# Patient Record
Sex: FEMALE | Race: WHITE | Employment: OTHER | URBAN - METROPOLITAN AREA
[De-identification: names, ages, dates, MRNs, and addresses within clinical notes are randomized per-mention and may not be internally consistent; named-entity substitution may affect disease eponyms.]

---

## 2017-02-16 ENCOUNTER — OFFICE VISIT (OUTPATIENT)
Dept: PRIMARY CARE CLINIC | Age: 77
End: 2017-02-16

## 2017-02-16 VITALS
BODY MASS INDEX: 23.41 KG/M2 | DIASTOLIC BLOOD PRESSURE: 80 MMHG | HEART RATE: 79 BPM | TEMPERATURE: 97.9 F | SYSTOLIC BLOOD PRESSURE: 130 MMHG | RESPIRATION RATE: 14 BRPM | OXYGEN SATURATION: 94 % | WEIGHT: 124 LBS | HEIGHT: 61 IN

## 2017-02-16 DIAGNOSIS — J43.1 PANLOBULAR EMPHYSEMA (HCC): ICD-10-CM

## 2017-02-16 DIAGNOSIS — Z12.31 SCREENING MAMMOGRAM, ENCOUNTER FOR: ICD-10-CM

## 2017-02-16 DIAGNOSIS — J44.9 OBSTRUCTIVE CHRONIC BRONCHITIS WITHOUT EXACERBATION (HCC): ICD-10-CM

## 2017-02-16 DIAGNOSIS — I10 HTN (HYPERTENSION), BENIGN: Primary | ICD-10-CM

## 2017-02-16 DIAGNOSIS — E78.2 MIXED HYPERLIPIDEMIA: ICD-10-CM

## 2017-02-16 PROCEDURE — 1090F PRES/ABSN URINE INCON ASSESS: CPT | Performed by: FAMILY MEDICINE

## 2017-02-16 PROCEDURE — G8399 PT W/DXA RESULTS DOCUMENT: HCPCS | Performed by: FAMILY MEDICINE

## 2017-02-16 PROCEDURE — G8427 DOCREV CUR MEDS BY ELIG CLIN: HCPCS | Performed by: FAMILY MEDICINE

## 2017-02-16 PROCEDURE — 99213 OFFICE O/P EST LOW 20 MIN: CPT | Performed by: FAMILY MEDICINE

## 2017-02-16 PROCEDURE — G8420 CALC BMI NORM PARAMETERS: HCPCS | Performed by: FAMILY MEDICINE

## 2017-02-16 PROCEDURE — 1123F ACP DISCUSS/DSCN MKR DOCD: CPT | Performed by: FAMILY MEDICINE

## 2017-02-16 PROCEDURE — 3023F SPIROM DOC REV: CPT | Performed by: FAMILY MEDICINE

## 2017-02-16 PROCEDURE — G8484 FLU IMMUNIZE NO ADMIN: HCPCS | Performed by: FAMILY MEDICINE

## 2017-02-16 PROCEDURE — 3017F COLORECTAL CA SCREEN DOC REV: CPT | Performed by: FAMILY MEDICINE

## 2017-02-16 PROCEDURE — 4004F PT TOBACCO SCREEN RCVD TLK: CPT | Performed by: FAMILY MEDICINE

## 2017-02-16 PROCEDURE — 4040F PNEUMOC VAC/ADMIN/RCVD: CPT | Performed by: FAMILY MEDICINE

## 2017-02-16 PROCEDURE — G8926 SPIRO NO PERF OR DOC: HCPCS | Performed by: FAMILY MEDICINE

## 2017-02-16 PROCEDURE — 3014F SCREEN MAMMO DOC REV: CPT | Performed by: FAMILY MEDICINE

## 2017-02-16 RX ORDER — AMLODIPINE BESYLATE 5 MG/1
TABLET ORAL
Qty: 90 TABLET | Refills: 1 | Status: SHIPPED | OUTPATIENT
Start: 2017-02-16 | End: 2017-09-25 | Stop reason: SDUPTHER

## 2017-02-16 ASSESSMENT — ENCOUNTER SYMPTOMS
SHORTNESS OF BREATH: 0
DIARRHEA: 0
COUGH: 0
VOMITING: 0
CONSTIPATION: 0
NAUSEA: 0
EYES NEGATIVE: 1
GASTROINTESTINAL NEGATIVE: 1
RESPIRATORY NEGATIVE: 1
PHOTOPHOBIA: 0
ABDOMINAL PAIN: 0
EYE ITCHING: 0
EYE REDNESS: 0
WHEEZING: 0
BACK PAIN: 0

## 2017-02-16 ASSESSMENT — PATIENT HEALTH QUESTIONNAIRE - PHQ9
SUM OF ALL RESPONSES TO PHQ QUESTIONS 1-9: 0
2. FEELING DOWN, DEPRESSED OR HOPELESS: 0
1. LITTLE INTEREST OR PLEASURE IN DOING THINGS: 0
SUM OF ALL RESPONSES TO PHQ9 QUESTIONS 1 & 2: 0

## 2017-03-02 ENCOUNTER — HOSPITAL ENCOUNTER (OUTPATIENT)
Dept: WOMENS IMAGING | Age: 77
Discharge: HOME | End: 2017-03-02
Payer: MEDICARE

## 2017-03-02 DIAGNOSIS — Z12.31 SCREENING MAMMOGRAM, ENCOUNTER FOR: ICD-10-CM

## 2017-03-02 PROCEDURE — G0202 SCR MAMMO BI INCL CAD: HCPCS

## 2017-09-25 ENCOUNTER — OFFICE VISIT (OUTPATIENT)
Dept: PRIMARY CARE CLINIC | Age: 77
End: 2017-09-25

## 2017-09-25 VITALS
OXYGEN SATURATION: 97 % | DIASTOLIC BLOOD PRESSURE: 84 MMHG | HEIGHT: 61 IN | WEIGHT: 128 LBS | SYSTOLIC BLOOD PRESSURE: 150 MMHG | TEMPERATURE: 97 F | HEART RATE: 76 BPM | BODY MASS INDEX: 24.17 KG/M2 | RESPIRATION RATE: 14 BRPM

## 2017-09-25 DIAGNOSIS — E78.2 MIXED HYPERLIPIDEMIA: ICD-10-CM

## 2017-09-25 DIAGNOSIS — J43.2 CENTRILOBULAR EMPHYSEMA (HCC): ICD-10-CM

## 2017-09-25 DIAGNOSIS — I10 HTN (HYPERTENSION), BENIGN: Primary | ICD-10-CM

## 2017-09-25 DIAGNOSIS — J30.2 SEASONAL ALLERGIC RHINITIS, UNSPECIFIED ALLERGIC RHINITIS TRIGGER: ICD-10-CM

## 2017-09-25 DIAGNOSIS — Z00.00 PREVENTATIVE HEALTH CARE: ICD-10-CM

## 2017-09-25 LAB
ALBUMIN SERPL-MCNC: 4.1 G/DL (ref 3.9–4.9)
ALP BLD-CCNC: 106 U/L (ref 40–130)
ALT SERPL-CCNC: 11 U/L (ref 0–33)
ANION GAP SERPL CALCULATED.3IONS-SCNC: 17 MEQ/L (ref 7–13)
AST SERPL-CCNC: 17 U/L (ref 0–35)
BILIRUB SERPL-MCNC: 0.4 MG/DL (ref 0–1.2)
BUN BLDV-MCNC: 12 MG/DL (ref 8–23)
CALCIUM SERPL-MCNC: 9.2 MG/DL (ref 8.6–10.2)
CHLORIDE BLD-SCNC: 100 MEQ/L (ref 98–107)
CHOLESTEROL, TOTAL: 210 MG/DL (ref 0–199)
CO2: 26 MEQ/L (ref 22–29)
CREAT SERPL-MCNC: 0.53 MG/DL (ref 0.5–0.9)
GFR AFRICAN AMERICAN: >60
GFR NON-AFRICAN AMERICAN: >60
GLOBULIN: 3.1 G/DL (ref 2.3–3.5)
GLUCOSE BLD-MCNC: 87 MG/DL (ref 74–109)
HDLC SERPL-MCNC: 73 MG/DL (ref 40–59)
LDL CHOLESTEROL CALCULATED: 106 MG/DL (ref 0–129)
POTASSIUM SERPL-SCNC: 4.4 MEQ/L (ref 3.5–5.1)
SODIUM BLD-SCNC: 143 MEQ/L (ref 132–144)
TOTAL PROTEIN: 7.2 G/DL (ref 6.4–8.1)
TRIGL SERPL-MCNC: 155 MG/DL (ref 0–200)

## 2017-09-25 PROCEDURE — G8420 CALC BMI NORM PARAMETERS: HCPCS | Performed by: FAMILY MEDICINE

## 2017-09-25 PROCEDURE — 4040F PNEUMOC VAC/ADMIN/RCVD: CPT | Performed by: FAMILY MEDICINE

## 2017-09-25 PROCEDURE — 3023F SPIROM DOC REV: CPT | Performed by: FAMILY MEDICINE

## 2017-09-25 PROCEDURE — 1090F PRES/ABSN URINE INCON ASSESS: CPT | Performed by: FAMILY MEDICINE

## 2017-09-25 PROCEDURE — 1123F ACP DISCUSS/DSCN MKR DOCD: CPT | Performed by: FAMILY MEDICINE

## 2017-09-25 PROCEDURE — G8399 PT W/DXA RESULTS DOCUMENT: HCPCS | Performed by: FAMILY MEDICINE

## 2017-09-25 PROCEDURE — 3017F COLORECTAL CA SCREEN DOC REV: CPT | Performed by: FAMILY MEDICINE

## 2017-09-25 PROCEDURE — 99214 OFFICE O/P EST MOD 30 MIN: CPT | Performed by: FAMILY MEDICINE

## 2017-09-25 PROCEDURE — G8926 SPIRO NO PERF OR DOC: HCPCS | Performed by: FAMILY MEDICINE

## 2017-09-25 PROCEDURE — 4004F PT TOBACCO SCREEN RCVD TLK: CPT | Performed by: FAMILY MEDICINE

## 2017-09-25 PROCEDURE — G8427 DOCREV CUR MEDS BY ELIG CLIN: HCPCS | Performed by: FAMILY MEDICINE

## 2017-09-25 RX ORDER — FEXOFENADINE HCL 180 MG/1
180 TABLET ORAL DAILY
Qty: 30 TABLET | Refills: 1 | Status: SHIPPED | OUTPATIENT
Start: 2017-09-25 | End: 2019-09-09

## 2017-09-25 RX ORDER — AMLODIPINE BESYLATE 5 MG/1
TABLET ORAL
Qty: 90 TABLET | Refills: 1 | Status: SHIPPED | OUTPATIENT
Start: 2017-09-25 | End: 2018-02-12 | Stop reason: SDUPTHER

## 2017-09-25 ASSESSMENT — ENCOUNTER SYMPTOMS
ABDOMINAL PAIN: 0
ORTHOPNEA: 0
CONSTIPATION: 0
PHOTOPHOBIA: 0
SWOLLEN GLANDS: 0
EYE ITCHING: 0
EYE REDNESS: 0
SHORTNESS OF BREATH: 0
SINUS PRESSURE: 1
DIARRHEA: 0
BLURRED VISION: 0
SORE THROAT: 0
HOARSE VOICE: 0
WHEEZING: 0
COUGH: 0
EYES NEGATIVE: 1
RESPIRATORY NEGATIVE: 1
BACK PAIN: 0
GASTROINTESTINAL NEGATIVE: 1

## 2018-02-12 ENCOUNTER — OFFICE VISIT (OUTPATIENT)
Dept: PRIMARY CARE CLINIC | Age: 78
End: 2018-02-12
Payer: MEDICARE

## 2018-02-12 VITALS
HEIGHT: 61 IN | RESPIRATION RATE: 18 BRPM | DIASTOLIC BLOOD PRESSURE: 82 MMHG | SYSTOLIC BLOOD PRESSURE: 130 MMHG | WEIGHT: 130 LBS | OXYGEN SATURATION: 95 % | BODY MASS INDEX: 24.55 KG/M2 | TEMPERATURE: 97.9 F | HEART RATE: 70 BPM

## 2018-02-12 DIAGNOSIS — E78.2 MIXED HYPERLIPIDEMIA: ICD-10-CM

## 2018-02-12 DIAGNOSIS — J44.9 OBSTRUCTIVE CHRONIC BRONCHITIS WITHOUT EXACERBATION (HCC): ICD-10-CM

## 2018-02-12 DIAGNOSIS — J43.1 PANLOBULAR EMPHYSEMA (HCC): ICD-10-CM

## 2018-02-12 DIAGNOSIS — Z00.00 PREVENTATIVE HEALTH CARE: ICD-10-CM

## 2018-02-12 DIAGNOSIS — I10 HTN (HYPERTENSION), BENIGN: Primary | ICD-10-CM

## 2018-02-12 PROCEDURE — 1090F PRES/ABSN URINE INCON ASSESS: CPT | Performed by: FAMILY MEDICINE

## 2018-02-12 PROCEDURE — 3023F SPIROM DOC REV: CPT | Performed by: FAMILY MEDICINE

## 2018-02-12 PROCEDURE — 99214 OFFICE O/P EST MOD 30 MIN: CPT | Performed by: FAMILY MEDICINE

## 2018-02-12 PROCEDURE — 4004F PT TOBACCO SCREEN RCVD TLK: CPT | Performed by: FAMILY MEDICINE

## 2018-02-12 PROCEDURE — 1123F ACP DISCUSS/DSCN MKR DOCD: CPT | Performed by: FAMILY MEDICINE

## 2018-02-12 PROCEDURE — G8484 FLU IMMUNIZE NO ADMIN: HCPCS | Performed by: FAMILY MEDICINE

## 2018-02-12 PROCEDURE — G8420 CALC BMI NORM PARAMETERS: HCPCS | Performed by: FAMILY MEDICINE

## 2018-02-12 PROCEDURE — G8427 DOCREV CUR MEDS BY ELIG CLIN: HCPCS | Performed by: FAMILY MEDICINE

## 2018-02-12 PROCEDURE — G8926 SPIRO NO PERF OR DOC: HCPCS | Performed by: FAMILY MEDICINE

## 2018-02-12 PROCEDURE — 3017F COLORECTAL CA SCREEN DOC REV: CPT | Performed by: FAMILY MEDICINE

## 2018-02-12 PROCEDURE — G8399 PT W/DXA RESULTS DOCUMENT: HCPCS | Performed by: FAMILY MEDICINE

## 2018-02-12 PROCEDURE — 4040F PNEUMOC VAC/ADMIN/RCVD: CPT | Performed by: FAMILY MEDICINE

## 2018-02-12 RX ORDER — AMLODIPINE BESYLATE 5 MG/1
TABLET ORAL
Qty: 90 TABLET | Refills: 3 | Status: SHIPPED | OUTPATIENT
Start: 2018-02-12 | End: 2018-11-07 | Stop reason: SDUPTHER

## 2018-02-12 ASSESSMENT — ENCOUNTER SYMPTOMS
GASTROINTESTINAL NEGATIVE: 1
BACK PAIN: 0
CONSTIPATION: 0
DIARRHEA: 0
ABDOMINAL PAIN: 0
PHOTOPHOBIA: 0
ORTHOPNEA: 0
EYES NEGATIVE: 1
WHEEZING: 0
SHORTNESS OF BREATH: 0
BLURRED VISION: 0
EYE REDNESS: 0
RESPIRATORY NEGATIVE: 1
EYE ITCHING: 0

## 2018-10-25 ENCOUNTER — OFFICE VISIT (OUTPATIENT)
Dept: PRIMARY CARE CLINIC | Age: 78
End: 2018-10-25
Payer: MEDICARE

## 2018-10-25 VITALS
RESPIRATION RATE: 16 BRPM | DIASTOLIC BLOOD PRESSURE: 80 MMHG | WEIGHT: 134 LBS | SYSTOLIC BLOOD PRESSURE: 124 MMHG | HEART RATE: 69 BPM | BODY MASS INDEX: 25.3 KG/M2 | HEIGHT: 61 IN | TEMPERATURE: 97.9 F | OXYGEN SATURATION: 95 %

## 2018-10-25 DIAGNOSIS — I10 HTN (HYPERTENSION), BENIGN: Primary | ICD-10-CM

## 2018-10-25 DIAGNOSIS — E55.9 VITAMIN D DEFICIENCY: ICD-10-CM

## 2018-10-25 DIAGNOSIS — E78.2 MIXED HYPERLIPIDEMIA: ICD-10-CM

## 2018-10-25 DIAGNOSIS — J43.1 PANLOBULAR EMPHYSEMA (HCC): ICD-10-CM

## 2018-10-25 DIAGNOSIS — Z00.00 PREVENTATIVE HEALTH CARE: ICD-10-CM

## 2018-10-25 DIAGNOSIS — H54.7 VISION IMPAIRMENT: ICD-10-CM

## 2018-10-25 LAB
ALBUMIN SERPL-MCNC: 4.1 G/DL (ref 3.9–4.9)
ALP BLD-CCNC: 87 U/L (ref 40–130)
ALT SERPL-CCNC: 7 U/L (ref 0–33)
ANION GAP SERPL CALCULATED.3IONS-SCNC: 14 MEQ/L (ref 7–13)
AST SERPL-CCNC: 15 U/L (ref 0–35)
BILIRUB SERPL-MCNC: 0.3 MG/DL (ref 0–1.2)
BUN BLDV-MCNC: 11 MG/DL (ref 8–23)
CALCIUM SERPL-MCNC: 9.5 MG/DL (ref 8.6–10.2)
CHLORIDE BLD-SCNC: 102 MEQ/L (ref 98–107)
CHOLESTEROL, TOTAL: 203 MG/DL (ref 0–199)
CO2: 27 MEQ/L (ref 22–29)
CONTROL: NORMAL
CREAT SERPL-MCNC: 0.53 MG/DL (ref 0.5–0.9)
GFR AFRICAN AMERICAN: >60
GFR NON-AFRICAN AMERICAN: >60
GLOBULIN: 3.3 G/DL (ref 2.3–3.5)
GLUCOSE BLD-MCNC: 75 MG/DL (ref 74–109)
HDLC SERPL-MCNC: 65 MG/DL (ref 40–59)
HEMOCCULT STL QL: NORMAL
LDL CHOLESTEROL CALCULATED: 108 MG/DL (ref 0–129)
POTASSIUM SERPL-SCNC: 4.4 MEQ/L (ref 3.5–5.1)
SODIUM BLD-SCNC: 143 MEQ/L (ref 132–144)
TOTAL PROTEIN: 7.4 G/DL (ref 6.4–8.1)
TRIGL SERPL-MCNC: 150 MG/DL (ref 0–200)
VITAMIN D 25-HYDROXY: 41.4 NG/ML (ref 30–100)

## 2018-10-25 PROCEDURE — 1123F ACP DISCUSS/DSCN MKR DOCD: CPT | Performed by: FAMILY MEDICINE

## 2018-10-25 PROCEDURE — 1090F PRES/ABSN URINE INCON ASSESS: CPT | Performed by: FAMILY MEDICINE

## 2018-10-25 PROCEDURE — 4040F PNEUMOC VAC/ADMIN/RCVD: CPT | Performed by: FAMILY MEDICINE

## 2018-10-25 PROCEDURE — 1036F TOBACCO NON-USER: CPT | Performed by: FAMILY MEDICINE

## 2018-10-25 PROCEDURE — G8484 FLU IMMUNIZE NO ADMIN: HCPCS | Performed by: FAMILY MEDICINE

## 2018-10-25 PROCEDURE — G8427 DOCREV CUR MEDS BY ELIG CLIN: HCPCS | Performed by: FAMILY MEDICINE

## 2018-10-25 PROCEDURE — 3023F SPIROM DOC REV: CPT | Performed by: FAMILY MEDICINE

## 2018-10-25 PROCEDURE — 99214 OFFICE O/P EST MOD 30 MIN: CPT | Performed by: FAMILY MEDICINE

## 2018-10-25 PROCEDURE — G8399 PT W/DXA RESULTS DOCUMENT: HCPCS | Performed by: FAMILY MEDICINE

## 2018-10-25 PROCEDURE — 82274 ASSAY TEST FOR BLOOD FECAL: CPT | Performed by: FAMILY MEDICINE

## 2018-10-25 PROCEDURE — G8419 CALC BMI OUT NRM PARAM NOF/U: HCPCS | Performed by: FAMILY MEDICINE

## 2018-10-25 PROCEDURE — 1101F PT FALLS ASSESS-DOCD LE1/YR: CPT | Performed by: FAMILY MEDICINE

## 2018-10-25 PROCEDURE — G8926 SPIRO NO PERF OR DOC: HCPCS | Performed by: FAMILY MEDICINE

## 2018-10-25 RX ORDER — UBIDECARENONE 100 MG
100 CAPSULE ORAL 2 TIMES DAILY
COMMUNITY

## 2018-10-25 ASSESSMENT — ENCOUNTER SYMPTOMS
CONSTIPATION: 0
DIARRHEA: 0
BACK PAIN: 0
ABDOMINAL PAIN: 0
RESPIRATORY NEGATIVE: 1
EYE REDNESS: 0
WHEEZING: 0
GASTROINTESTINAL NEGATIVE: 1
EYE ITCHING: 0
PHOTOPHOBIA: 0
BLURRED VISION: 0
ORTHOPNEA: 0
SHORTNESS OF BREATH: 0

## 2018-10-25 ASSESSMENT — PATIENT HEALTH QUESTIONNAIRE - PHQ9
SUM OF ALL RESPONSES TO PHQ9 QUESTIONS 1 & 2: 0
SUM OF ALL RESPONSES TO PHQ QUESTIONS 1-9: 0
SUM OF ALL RESPONSES TO PHQ QUESTIONS 1-9: 0
2. FEELING DOWN, DEPRESSED OR HOPELESS: 0
1. LITTLE INTEREST OR PLEASURE IN DOING THINGS: 0

## 2018-10-25 NOTE — PROGRESS NOTES
PER PROBLEM SHEET     Family History   Problem Relation Age of Onset    Diabetes Other         GRANDMOTHER     Social History     Social History    Marital status:      Spouse name: N/A    Number of children: N/A    Years of education: N/A     Occupational History    Not on file. Social History Main Topics    Smoking status: Former Smoker     Packs/day: 0.25     Years: 25.00     Types: Cigarettes     Quit date: 8/25/2018    Smokeless tobacco: Never Used    Alcohol use No    Drug use: No    Sexual activity: Not on file     Other Topics Concern    Not on file     Social History Narrative    No narrative on file     Allergies:  Patient has no allergy information on record. Review of Systems   Constitutional: Negative. Negative for activity change, appetite change, fatigue and malaise/fatigue. HENT: Negative. Eyes: Positive for visual disturbance. Negative for blurred vision, photophobia, redness and itching. Respiratory: Negative. Negative for shortness of breath and wheezing. Cardiovascular: Negative. Negative for chest pain, palpitations, orthopnea and PND. Gastrointestinal: Negative. Negative for abdominal pain, constipation and diarrhea. Genitourinary: Negative. Negative for hematuria, pelvic pain and urgency. Musculoskeletal: Negative. Negative for back pain and neck pain. Skin: Negative. Neurological: Negative. Negative for headaches. Psychiatric/Behavioral: Negative. Negative for agitation and behavioral problems. The patient is not nervous/anxious. Objective:   /80 (Site: Right Upper Arm, Position: Sitting, Cuff Size: Medium Adult)   Pulse 69   Temp 97.9 °F (36.6 °C) (Oral)   Resp 16   Ht 5' 1\" (1.549 m)   Wt 134 lb (60.8 kg)   LMP  (LMP Unknown)   SpO2 95%   BMI 25.32 kg/m²     Physical Exam   Constitutional: She is oriented to person, place, and time. She appears well-developed and well-nourished.    HENT:   Head: Normocephalic

## 2018-11-07 ENCOUNTER — OFFICE VISIT (OUTPATIENT)
Dept: PRIMARY CARE CLINIC | Age: 78
End: 2018-11-07
Payer: MEDICARE

## 2018-11-07 VITALS
BODY MASS INDEX: 25.3 KG/M2 | WEIGHT: 134 LBS | TEMPERATURE: 98 F | RESPIRATION RATE: 16 BRPM | OXYGEN SATURATION: 94 % | HEIGHT: 61 IN | HEART RATE: 64 BPM | DIASTOLIC BLOOD PRESSURE: 84 MMHG | SYSTOLIC BLOOD PRESSURE: 124 MMHG

## 2018-11-07 DIAGNOSIS — J43.1 PANLOBULAR EMPHYSEMA (HCC): ICD-10-CM

## 2018-11-07 DIAGNOSIS — Z23 NEEDS FLU SHOT: ICD-10-CM

## 2018-11-07 DIAGNOSIS — I10 HTN (HYPERTENSION), BENIGN: Primary | ICD-10-CM

## 2018-11-07 DIAGNOSIS — E78.2 MIXED HYPERLIPIDEMIA: ICD-10-CM

## 2018-11-07 PROCEDURE — G0008 ADMIN INFLUENZA VIRUS VAC: HCPCS | Performed by: FAMILY MEDICINE

## 2018-11-07 PROCEDURE — 4040F PNEUMOC VAC/ADMIN/RCVD: CPT | Performed by: FAMILY MEDICINE

## 2018-11-07 PROCEDURE — 3023F SPIROM DOC REV: CPT | Performed by: FAMILY MEDICINE

## 2018-11-07 PROCEDURE — G8482 FLU IMMUNIZE ORDER/ADMIN: HCPCS | Performed by: FAMILY MEDICINE

## 2018-11-07 PROCEDURE — 1090F PRES/ABSN URINE INCON ASSESS: CPT | Performed by: FAMILY MEDICINE

## 2018-11-07 PROCEDURE — 90662 IIV NO PRSV INCREASED AG IM: CPT | Performed by: FAMILY MEDICINE

## 2018-11-07 PROCEDURE — G8427 DOCREV CUR MEDS BY ELIG CLIN: HCPCS | Performed by: FAMILY MEDICINE

## 2018-11-07 PROCEDURE — G8399 PT W/DXA RESULTS DOCUMENT: HCPCS | Performed by: FAMILY MEDICINE

## 2018-11-07 PROCEDURE — 1101F PT FALLS ASSESS-DOCD LE1/YR: CPT | Performed by: FAMILY MEDICINE

## 2018-11-07 PROCEDURE — 1036F TOBACCO NON-USER: CPT | Performed by: FAMILY MEDICINE

## 2018-11-07 PROCEDURE — 99213 OFFICE O/P EST LOW 20 MIN: CPT | Performed by: FAMILY MEDICINE

## 2018-11-07 PROCEDURE — G8926 SPIRO NO PERF OR DOC: HCPCS | Performed by: FAMILY MEDICINE

## 2018-11-07 PROCEDURE — 1123F ACP DISCUSS/DSCN MKR DOCD: CPT | Performed by: FAMILY MEDICINE

## 2018-11-07 PROCEDURE — G8419 CALC BMI OUT NRM PARAM NOF/U: HCPCS | Performed by: FAMILY MEDICINE

## 2018-11-07 RX ORDER — AMLODIPINE BESYLATE 5 MG/1
TABLET ORAL
Qty: 90 TABLET | Refills: 1 | Status: SHIPPED | OUTPATIENT
Start: 2018-11-07 | End: 2019-05-06 | Stop reason: SDUPTHER

## 2018-11-07 ASSESSMENT — ENCOUNTER SYMPTOMS
WHEEZING: 0
PHOTOPHOBIA: 0
GASTROINTESTINAL NEGATIVE: 1
ABDOMINAL PAIN: 0
EYE ITCHING: 0
SHORTNESS OF BREATH: 0
DIARRHEA: 0
EYE REDNESS: 0
BACK PAIN: 0
CONSTIPATION: 0
RESPIRATORY NEGATIVE: 1
EYES NEGATIVE: 1

## 2018-12-02 ENCOUNTER — CARE COORDINATION (OUTPATIENT)
Dept: CARE COORDINATION | Age: 78
End: 2018-12-02

## 2018-12-04 ENCOUNTER — CARE COORDINATION (OUTPATIENT)
Dept: CARE COORDINATION | Age: 78
End: 2018-12-04

## 2018-12-12 ENCOUNTER — CARE COORDINATION (OUTPATIENT)
Dept: CARE COORDINATION | Age: 78
End: 2018-12-12

## 2019-02-15 ENCOUNTER — TELEPHONE (OUTPATIENT)
Dept: FAMILY MEDICINE CLINIC | Age: 79
End: 2019-02-15

## 2019-05-06 ENCOUNTER — OFFICE VISIT (OUTPATIENT)
Dept: FAMILY MEDICINE CLINIC | Age: 79
End: 2019-05-06
Payer: MEDICARE

## 2019-05-06 VITALS
HEIGHT: 61 IN | RESPIRATION RATE: 16 BRPM | OXYGEN SATURATION: 97 % | HEART RATE: 76 BPM | SYSTOLIC BLOOD PRESSURE: 144 MMHG | BODY MASS INDEX: 26.06 KG/M2 | TEMPERATURE: 98.1 F | DIASTOLIC BLOOD PRESSURE: 82 MMHG | WEIGHT: 138 LBS

## 2019-05-06 DIAGNOSIS — Z23 NEED FOR SHINGLES VACCINE: ICD-10-CM

## 2019-05-06 DIAGNOSIS — Z87.891 PERSONAL HISTORY OF NICOTINE DEPENDENCE: ICD-10-CM

## 2019-05-06 DIAGNOSIS — M85.89 OSTEOPENIA OF MULTIPLE SITES: Chronic | ICD-10-CM

## 2019-05-06 DIAGNOSIS — Z12.2 ENCOUNTER FOR SCREENING FOR LUNG CANCER: ICD-10-CM

## 2019-05-06 DIAGNOSIS — J41.1 MUCOPURULENT CHRONIC BRONCHITIS (HCC): ICD-10-CM

## 2019-05-06 DIAGNOSIS — H35.351 CYSTOID MACULAR DEGENERATION OF RIGHT EYE: Chronic | ICD-10-CM

## 2019-05-06 DIAGNOSIS — Z12.39 SCREENING FOR BREAST CANCER: ICD-10-CM

## 2019-05-06 DIAGNOSIS — F17.200 TOBACCO USE DISORDER: Chronic | ICD-10-CM

## 2019-05-06 DIAGNOSIS — I10 HTN (HYPERTENSION), BENIGN: ICD-10-CM

## 2019-05-06 DIAGNOSIS — I10 HTN (HYPERTENSION), BENIGN: Primary | ICD-10-CM

## 2019-05-06 LAB
CREATININE URINE: 50.7 MG/DL
MICROALBUMIN UR-MCNC: <1.2 MG/DL
MICROALBUMIN/CREAT UR-RTO: NORMAL MG/G (ref 0–30)
TSH SERPL DL<=0.05 MIU/L-ACNC: 1.76 UIU/ML (ref 0.44–3.86)

## 2019-05-06 PROCEDURE — 4040F PNEUMOC VAC/ADMIN/RCVD: CPT | Performed by: FAMILY MEDICINE

## 2019-05-06 PROCEDURE — G8427 DOCREV CUR MEDS BY ELIG CLIN: HCPCS | Performed by: FAMILY MEDICINE

## 2019-05-06 PROCEDURE — G8510 SCR DEP NEG, NO PLAN REQD: HCPCS | Performed by: FAMILY MEDICINE

## 2019-05-06 PROCEDURE — 1090F PRES/ABSN URINE INCON ASSESS: CPT | Performed by: FAMILY MEDICINE

## 2019-05-06 PROCEDURE — 1123F ACP DISCUSS/DSCN MKR DOCD: CPT | Performed by: FAMILY MEDICINE

## 2019-05-06 PROCEDURE — G8926 SPIRO NO PERF OR DOC: HCPCS | Performed by: FAMILY MEDICINE

## 2019-05-06 PROCEDURE — 1036F TOBACCO NON-USER: CPT | Performed by: FAMILY MEDICINE

## 2019-05-06 PROCEDURE — G8399 PT W/DXA RESULTS DOCUMENT: HCPCS | Performed by: FAMILY MEDICINE

## 2019-05-06 PROCEDURE — 3023F SPIROM DOC REV: CPT | Performed by: FAMILY MEDICINE

## 2019-05-06 PROCEDURE — 99214 OFFICE O/P EST MOD 30 MIN: CPT | Performed by: FAMILY MEDICINE

## 2019-05-06 PROCEDURE — 93000 ELECTROCARDIOGRAM COMPLETE: CPT | Performed by: FAMILY MEDICINE

## 2019-05-06 PROCEDURE — G8419 CALC BMI OUT NRM PARAM NOF/U: HCPCS | Performed by: FAMILY MEDICINE

## 2019-05-06 RX ORDER — AMLODIPINE BESYLATE 5 MG/1
TABLET ORAL
Qty: 90 TABLET | Refills: 1 | Status: SHIPPED | OUTPATIENT
Start: 2019-05-06 | End: 2020-02-05 | Stop reason: SDUPTHER

## 2019-05-06 RX ORDER — ANTIOX #8/OM3/DHA/EPA/LUT/ZEAX 250-2.5 MG
1 CAPSULE ORAL DAILY
Qty: 90 CAPSULE | Refills: 3 | Status: ON HOLD | OUTPATIENT
Start: 2019-05-06 | End: 2021-01-01

## 2019-05-06 ASSESSMENT — PATIENT HEALTH QUESTIONNAIRE - PHQ9
2. FEELING DOWN, DEPRESSED OR HOPELESS: 0
1. LITTLE INTEREST OR PLEASURE IN DOING THINGS: 0
SUM OF ALL RESPONSES TO PHQ9 QUESTIONS 1 & 2: 0
SUM OF ALL RESPONSES TO PHQ QUESTIONS 1-9: 0
SUM OF ALL RESPONSES TO PHQ QUESTIONS 1-9: 0

## 2019-05-06 NOTE — PROGRESS NOTES
Subjective  Jaky Burns, 70 y.o. female presents today with:  Chief Complaint   Patient presents with   [address]     Former Dr John Coyle pt.  Health Maintenance     discuss shingles.  Hypertension     denies chest pain and headache. HPI    This is a new patient to me. I have reviewed the past medical and surgical history, social history and family history provided. I have reviewed  medication, previous testing and working diagnoses. I have reviewed the allergies and health maintenance information and correlated it into my decision making for this patient for care, diagnostics, consultations and treatment for today's visit. Patient is here for f/u HTN. Is compliant with meds and has no side effects from them. Avoids added salt. Tries to eat healthy. Exercises occasionally. Has no chest pain, shortness of breath,  or edema. Rare brief palpitations without associated symptoms for several years. Tobacco use disorder - quit tobacco use 2 months ago; has been told she has COPD. No morning cough. No wheezing. Occasional shortness of breath. Worked in yard without [employer] yesterday. On no meds. No other questions and or concerns for today's visit      Review of Systems No fevers, chills, sweats. No unintended weight loss. No abdominal pain, nausea, vomiting, diarrhea, constipation, bloody stools, black tarry stools. No rashes. No swollen glands.         Past Medical History:   Diagnosis Date    COPD (chronic obstructive pulmonary disease) ([address]     Hypertension     Osteoarthritis      Past Surgical History:   Procedure Laterality Date    EYE SURGERY  2010    PER PROBLEM SHEET    HYSTERECTOMY  1975    PER PROBLEM SHEET    LITHOTRIPSY  2004    PER PROBLEM SHEET    VEIN SURGERY  1988    PER PROBLEM SHEET     Social History     Socioeconomic History    Marital status:      Spouse name: Not on file    Number of children: Not on file    Years of education: Not on file    Highest education level: Not on file   Occupational History    Not on file   Social Needs    Financial resource strain: Not on file    Food insecurity:     Worry: Not on file     Inability: Not on file    Transportation needs:     Medical: Not on file     Non-medical: Not on file   Tobacco Use    Smoking status: Former Smoker     Packs/day: 0.25     Years: 25.00     Pack years: 6.25     Types: Cigarettes     Last attempt to quit: 2018     Years since quittin.6    Smokeless tobacco: Never Used   Substance and Sexual Activity    Alcohol use: No    Drug use: No    Sexual activity: Not on file   Lifestyle    Physical activity:     Days per week: Not on file     Minutes per session: Not on file    Stress: Not on file   Relationships    Social connections:     Talks on phone: Not on file     Gets together: Not on file     Attends Denominational service: Not on file     Active member of club or organization: Not on file     Attends meetings of clubs or organizations: Not on file     Relationship status: Not on file    Intimate partner violence:     Fear of current or ex partner: Not on file     Emotionally abused: Not on file     Physically abused: Not on file     Forced sexual activity: Not on file   Other Topics Concern    Not on file   Social History Narrative    Not on file     Family History   Problem Relation Age of Onset    Diabetes Other         GRANDMOTHER     No Known Allergies  Current Outpatient Medications   Medication Sig Dispense Refill    amLODIPine (NORVASC) 5 MG tablet TAKE 1 TABLET BY MOUTH DAILY.  90 tablet 1    zoster recombinant adjuvanted vaccine (SHINGRIX) 50 MCG/0.5ML SUSR injection Inject 0.5 mLs into the muscle once for 1 dose 0.5 mL 0    Multiple Vitamins-Minerals (PRESERVISION AREDS 2) CAPS Take 1 capsule by mouth daily 90 capsule 3    coenzyme Q10 100 MG CAPS capsule Take 100 mg by mouth daily      fexofenadine (ALLEGRA ALLERGY) 180 MG tablet Take 1 tablet by mouth daily 30 tablet 1    Ascorbic Acid (VITAMIN C) 500 MG tablet Take 500 mg by mouth daily.  vitamin D (CHOLECALCIFEROL) 1000 UNIT TABS tablet Take 1,000 Int'l Units by mouth daily.  OMEGA-3 KRILL  MG CAPS Take 1 tablet by mouth daily. No current facility-administered medications for this visit. PMH, Surgical Hx, Family Hx, and Social Hxreviewed and updated. Health Maintenance reviewed. Objective    Vitals:    05/06/19 1115 05/06/19 1137   BP: (!) 150/90 (!) 144/82   Pulse: 76    Resp: 16    Temp: 98.1 °F (36.7 °C)    SpO2: 97%    Weight: 138 lb (62.6 kg)    Height: 5' 1\" (1.549 m)         Physical Exam   Constitutional: She is oriented to person, place, and time. She appears well-developed and well-nourished. HENT:   Head: Normocephalic and atraumatic. Eyes: Conjunctivae are normal. No scleral icterus. Neck: Neck supple. Carotid bruit is not present. No thyromegaly present. Cardiovascular: Normal rate, regular rhythm, S1 normal, S2 normal, normal heart sounds and intact distal pulses. Pulmonary/Chest: Effort normal and breath sounds normal. She has no wheezes. She has no rales. Abdominal: Soft. Bowel sounds are normal. She exhibits no distension and no mass. There is no tenderness. Musculoskeletal: She exhibits no edema (BLE). Lymphadenopathy:     She has no cervical adenopathy. Neurological: She is alert and oriented to person, place, and time. Skin: Skin is warm and dry. Psychiatric: She has a normal mood and affect.        EKG - sinus bradycardia at 59 BPM, poor R-wave progression, no acute changes  No results found for: LABA1C  Lab Results   Component Value Date    CREATININE 0.53 10/25/2018     Lab Results   Component Value Date    ALT 7 10/25/2018    AST 15 10/25/2018     Lab Results   Component Value Date    CHOL 203 (H) 10/25/2018    TRIG 150 10/25/2018    HDL 65 (H) 10/25/2018    LDLCALC 108 10/25/2018        Assessment & Plan   Visit Diagnoses and coordination of care. I have reviewed the patient's medical history in detail and updated the computerized patient record. More than 50% of the appointment was spent in face-to-face counseling, education and care coordination. Orders Placed This Encounter   Procedures    LIZETH DIGITAL SCREEN W OR WO CAD BILATERAL     Standing Status:   Future     Standing Expiration Date:   7/6/2020    DEXA BONE DENSITY AXIAL SKELETON     Standing Status:   Future     Standing Expiration Date:   5/6/2020    CT LUNG SCREENING (ANNUAL)     Standing Status:   Future     Standing Expiration Date:   5/6/2020     Order Specific Question:   Is there documentation of shared decision making? Answer:   Yes     Order Specific Question:   Does the patient show any signs or symptoms of lung cancer? Answer:   No     Order Specific Question:   Is this the first (baseline) CT or an annual exam?     Answer: Annual [2]     Order Specific Question:   Is this a low dose CT or a routine CT? Answer:   Low Dose CT [1]     Order Specific Question:   Smoking Status? Answer: Former Smoker [4]     Order Specific Question:   Date quit smoking? (must be within 15 years)     Answer:   2/1/2019     Order Specific Question:   Smoking packs per day? Answer:   0.5     Order Specific Question:   Years smoking? Answer:   36    TSH without Reflex     Standing Status:   Future     Standing Expiration Date:   5/6/2020    Microalbumin / Creatinine Urine Ratio     Standing Status:   Future     Standing Expiration Date:   7/6/2019    EKG 12 Lead     Order Specific Question:   Reason for Exam?     Answer:   Hypertension     Orders Placed This Encounter   Medications    amLODIPine (NORVASC) 5 MG tablet     Sig: TAKE 1 TABLET BY MOUTH DAILY.      Dispense:  90 tablet     Refill:  1    zoster recombinant adjuvanted vaccine (SHINGRIX) 50 MCG/0.5ML SUSR injection     Sig: Inject 0.5 mLs into the muscle once for 1 dose     Dispense:  0.5 mL     Refill:  0    Multiple Vitamins-Minerals (PRESERVISION AREDS 2) CAPS     Sig: Take 1 capsule by mouth daily     Dispense:  90 capsule     Refill:  3     Medications Discontinued During This Encounter   Medication Reason    amLODIPine (NORVASC) 5 MG tablet REORDER     Return in about 1 month (around 6/3/2019) for HTN. Quality & Risk Score Accuracy    Last edited 05/06/19 12:15 EDT by Emy Torres MD           Controlled Substances Monitoring:     No flowsheet data found.     Emy Torres MD

## 2019-06-10 ENCOUNTER — OFFICE VISIT (OUTPATIENT)
Dept: FAMILY MEDICINE CLINIC | Age: 79
End: 2019-06-10
Payer: MEDICARE

## 2019-06-10 VITALS
HEART RATE: 72 BPM | TEMPERATURE: 97.7 F | SYSTOLIC BLOOD PRESSURE: 130 MMHG | DIASTOLIC BLOOD PRESSURE: 82 MMHG | WEIGHT: 145.2 LBS | OXYGEN SATURATION: 97 % | BODY MASS INDEX: 27.41 KG/M2 | RESPIRATION RATE: 14 BRPM | HEIGHT: 61 IN

## 2019-06-10 DIAGNOSIS — I10 HTN (HYPERTENSION), BENIGN: Primary | ICD-10-CM

## 2019-06-10 DIAGNOSIS — F17.200 TOBACCO USE DISORDER: Chronic | ICD-10-CM

## 2019-06-10 PROCEDURE — 1036F TOBACCO NON-USER: CPT | Performed by: FAMILY MEDICINE

## 2019-06-10 PROCEDURE — G8399 PT W/DXA RESULTS DOCUMENT: HCPCS | Performed by: FAMILY MEDICINE

## 2019-06-10 PROCEDURE — G8427 DOCREV CUR MEDS BY ELIG CLIN: HCPCS | Performed by: FAMILY MEDICINE

## 2019-06-10 PROCEDURE — 1123F ACP DISCUSS/DSCN MKR DOCD: CPT | Performed by: FAMILY MEDICINE

## 2019-06-10 PROCEDURE — 1090F PRES/ABSN URINE INCON ASSESS: CPT | Performed by: FAMILY MEDICINE

## 2019-06-10 PROCEDURE — 4040F PNEUMOC VAC/ADMIN/RCVD: CPT | Performed by: FAMILY MEDICINE

## 2019-06-10 PROCEDURE — 99214 OFFICE O/P EST MOD 30 MIN: CPT | Performed by: FAMILY MEDICINE

## 2019-06-10 PROCEDURE — G8419 CALC BMI OUT NRM PARAM NOF/U: HCPCS | Performed by: FAMILY MEDICINE

## 2019-06-10 NOTE — PATIENT INSTRUCTIONS
[employer]: WellFed, WellFed 2, [employer]    Get a new BP cuff. Check BP a couple of times a week. If blood pressure in higher than 135/85, then call and I will prescribe low dose of lisinopril. Then, we will recheck one month after any changes in meds if they are made.

## 2019-06-10 NOTE — PROGRESS NOTES
Subjective  [place], 70 y.o. female presents today with:  Chief Complaint   Patient presents with    Hypertension     follow up, she says it is still running high           HPI    Patient is here for f/u HTN. Is compliant with meds and has no side effects from them. Avoids added salt. Tries to eat healthy. Exercises occasionally. Has no chest pain, shortness of breath, palpitations or edema. No other questions and or concerns for today's visit      Review of Systems No fevers, chills, sweats. No unintended weight loss. No abdominal pain, nausea, vomiting, diarrhea, constipation, bloody stools, black tarry stools. No rashes. No swollen glands.         Past Medical History:   Diagnosis Date    COPD (chronic obstructive pulmonary disease) ([facility] [address]     Hypertension     Osteoarthritis      Past Surgical History:   Procedure Laterality Date    EYE SURGERY      PER PROBLEM SHEET    HYSTERECTOMY      PER PROBLEM SHEET    LITHOTRIPSY      PER PROBLEM SHEET    VEIN SURGERY  1988    PER PROBLEM SHEET     Social History     Socioeconomic History    Marital status:      Spouse name: Not on file    Number of children: Not on file    Years of education: Not on file    Highest education level: Not on file   Occupational History    Not on file   Social Needs    Financial resource strain: Not on file    Food insecurity:     Worry: Not on file     Inability: Not on file    Transportation needs:     Medical: Not on file     Non-medical: Not on file   Tobacco Use    Smoking status: Former Smoker     Packs/day: 0.25     Years: 25.00     Pack years: 6.25     Types: Cigarettes     Last attempt to quit: 2018     Years since quittin.7    Smokeless tobacco: Never Used   Substance and Sexual Activity    Alcohol use: No    Drug use: No    Sexual activity: Not on file   Lifestyle    Physical activity:     Days per week: Not on file     Minutes per session: Not on file    Stress: for HTN. Controlled Substance Monitoring:    Acute and Chronic Pain Monitoring:   No flowsheet data found.         Kelsi Alvarenga MD

## 2019-06-28 ENCOUNTER — TELEPHONE (OUTPATIENT)
Dept: CASE MANAGEMENT | Age: 79
End: 2019-06-28

## 2019-06-28 NOTE — TELEPHONE ENCOUNTER
Second voicemail left requesting return call to go over smoking history.   Medicare does not require a pre-cert so if Adrian Monroy does not call back before her scheduled appointment on Mon 7/1/19 at [address] [place] [address], we can asking her smoking history at that time prior to the exam.

## 2019-06-28 NOTE — PROGRESS NOTES
Physician documentation on smoking history and CT Lung Screening reviewed. All required documentation complete. Patient is a former smoker (quit 2/2019 - 4 months) with a 33.75 pack year history per physician documentation. I spoke to patient to review details of CT Lung Screening exam.  All questions answered at this time.

## 2019-07-01 ENCOUNTER — HOSPITAL ENCOUNTER (OUTPATIENT)
Dept: WOMENS IMAGING | Age: 79
Discharge: HOME | End: 2019-07-03
Payer: MEDICARE

## 2019-07-01 ENCOUNTER — HOSPITAL ENCOUNTER (OUTPATIENT)
Dept: CT IMAGING | Age: 79
Discharge: HOME | End: 2019-07-03
Payer: MEDICARE

## 2019-07-01 DIAGNOSIS — F17.200 TOBACCO USE DISORDER: Chronic | ICD-10-CM

## 2019-07-01 DIAGNOSIS — Z12.39 SCREENING FOR BREAST CANCER: ICD-10-CM

## 2019-07-01 DIAGNOSIS — Z87.891 PERSONAL HISTORY OF NICOTINE DEPENDENCE: ICD-10-CM

## 2019-07-01 DIAGNOSIS — Z12.2 ENCOUNTER FOR SCREENING FOR LUNG CANCER: ICD-10-CM

## 2019-07-01 DIAGNOSIS — M85.89 OSTEOPENIA OF MULTIPLE SITES: Chronic | ICD-10-CM

## 2019-07-01 PROCEDURE — 77080 DXA BONE DENSITY AXIAL: CPT

## 2019-07-01 PROCEDURE — 77063 BREAST TOMOSYNTHESIS BI: CPT

## 2019-07-01 PROCEDURE — G0297 LDCT FOR LUNG CA SCREEN: HCPCS

## 2019-07-31 ENCOUNTER — OFFICE VISIT (OUTPATIENT)
Dept: FAMILY MEDICINE CLINIC | Age: 79
End: 2019-07-31
Payer: MEDICARE

## 2019-07-31 VITALS
RESPIRATION RATE: 16 BRPM | WEIGHT: 149 LBS | HEIGHT: 61 IN | OXYGEN SATURATION: 98 % | DIASTOLIC BLOOD PRESSURE: 70 MMHG | BODY MASS INDEX: 28.13 KG/M2 | TEMPERATURE: 98.5 F | HEART RATE: 78 BPM | SYSTOLIC BLOOD PRESSURE: 130 MMHG

## 2019-07-31 DIAGNOSIS — Z23 NEED FOR SHINGLES VACCINE: ICD-10-CM

## 2019-07-31 DIAGNOSIS — R06.09 DYSPNEA ON EXERTION: Chronic | ICD-10-CM

## 2019-07-31 DIAGNOSIS — M85.89 OSTEOPENIA OF MULTIPLE SITES: Primary | Chronic | ICD-10-CM

## 2019-07-31 PROBLEM — F17.200 TOBACCO USE DISORDER: Chronic | Status: RESOLVED | Noted: 2019-05-06 | Resolved: 2019-07-31

## 2019-07-31 LAB
ANION GAP SERPL CALCULATED.3IONS-SCNC: 13 MEQ/L (ref 9–15)
BASOPHILS ABSOLUTE: 0 K/UL (ref 0–0.2)
BASOPHILS RELATIVE PERCENT: 1.2 %
BUN BLDV-MCNC: 18 MG/DL (ref 8–23)
CALCIUM SERPL-MCNC: 9.5 MG/DL (ref 8.5–9.9)
CHLORIDE BLD-SCNC: 103 MEQ/L (ref 95–107)
CO2: 27 MEQ/L (ref 20–31)
CREAT SERPL-MCNC: 0.65 MG/DL (ref 0.5–0.9)
EOSINOPHILS ABSOLUTE: 0.1 K/UL (ref 0–0.7)
EOSINOPHILS RELATIVE PERCENT: 2.6 %
GFR AFRICAN AMERICAN: >60
GFR NON-AFRICAN AMERICAN: >60
GLUCOSE BLD-MCNC: 83 MG/DL (ref 70–99)
HCT VFR BLD CALC: 42 % (ref 37–47)
HEMOGLOBIN: 14.3 G/DL (ref 12–16)
LYMPHOCYTES ABSOLUTE: 1.1 K/UL (ref 1–4.8)
LYMPHOCYTES RELATIVE PERCENT: 24.7 %
MCH RBC QN AUTO: 32.4 PG (ref 27–31.3)
MCHC RBC AUTO-ENTMCNC: 34 % (ref 33–37)
MCV RBC AUTO: 95.4 FL (ref 82–100)
MONOCYTES ABSOLUTE: 0.4 K/UL (ref 0.2–0.8)
MONOCYTES RELATIVE PERCENT: 9.5 %
NEUTROPHILS ABSOLUTE: 2.6 K/UL (ref 1.4–6.5)
NEUTROPHILS RELATIVE PERCENT: 62 %
PDW BLD-RTO: 14 % (ref 11.5–14.5)
PLATELET # BLD: 297 K/UL (ref 130–400)
POTASSIUM SERPL-SCNC: 3.9 MEQ/L (ref 3.4–4.9)
RBC # BLD: 4.4 M/UL (ref 4.2–5.4)
SODIUM BLD-SCNC: 143 MEQ/L (ref 135–144)
WBC # BLD: 4.3 K/UL (ref 4.8–10.8)

## 2019-07-31 PROCEDURE — 93000 ELECTROCARDIOGRAM COMPLETE: CPT | Performed by: FAMILY MEDICINE

## 2019-07-31 PROCEDURE — G8399 PT W/DXA RESULTS DOCUMENT: HCPCS | Performed by: FAMILY MEDICINE

## 2019-07-31 PROCEDURE — 99214 OFFICE O/P EST MOD 30 MIN: CPT | Performed by: FAMILY MEDICINE

## 2019-07-31 PROCEDURE — G8427 DOCREV CUR MEDS BY ELIG CLIN: HCPCS | Performed by: FAMILY MEDICINE

## 2019-07-31 PROCEDURE — 1036F TOBACCO NON-USER: CPT | Performed by: FAMILY MEDICINE

## 2019-07-31 PROCEDURE — 1090F PRES/ABSN URINE INCON ASSESS: CPT | Performed by: FAMILY MEDICINE

## 2019-07-31 PROCEDURE — 1123F ACP DISCUSS/DSCN MKR DOCD: CPT | Performed by: FAMILY MEDICINE

## 2019-07-31 PROCEDURE — G8419 CALC BMI OUT NRM PARAM NOF/U: HCPCS | Performed by: FAMILY MEDICINE

## 2019-07-31 PROCEDURE — 4040F PNEUMOC VAC/ADMIN/RCVD: CPT | Performed by: FAMILY MEDICINE

## 2019-07-31 RX ORDER — ALENDRONATE SODIUM 70 MG/1
70 TABLET ORAL
Qty: 12 TABLET | Refills: 3 | Status: SHIPPED | OUTPATIENT
Start: 2019-07-31 | End: 2019-09-09

## 2019-07-31 RX ORDER — ANTACID TABLETS 648 MG/1
1 TABLET, CHEWABLE ORAL 2 TIMES DAILY
Qty: 60 TABLET | Refills: 11 | Status: SHIPPED | OUTPATIENT
Start: 2019-07-31 | End: 2020-08-10

## 2019-09-09 ENCOUNTER — OFFICE VISIT (OUTPATIENT)
Dept: FAMILY MEDICINE CLINIC | Age: 79
End: 2019-09-09
Payer: MEDICARE

## 2019-09-09 VITALS
TEMPERATURE: 97.2 F | DIASTOLIC BLOOD PRESSURE: 82 MMHG | OXYGEN SATURATION: 97 % | RESPIRATION RATE: 20 BRPM | SYSTOLIC BLOOD PRESSURE: 132 MMHG | HEART RATE: 74 BPM | BODY MASS INDEX: 28.7 KG/M2 | WEIGHT: 152 LBS | HEIGHT: 61 IN

## 2019-09-09 DIAGNOSIS — J41.1 MUCOPURULENT CHRONIC BRONCHITIS (HCC): ICD-10-CM

## 2019-09-09 DIAGNOSIS — Z23 NEED FOR PROPHYLACTIC VACCINATION AND INOCULATION AGAINST VARICELLA: ICD-10-CM

## 2019-09-09 DIAGNOSIS — R09.82 POST-NASAL DRIP: Chronic | ICD-10-CM

## 2019-09-09 DIAGNOSIS — I10 HTN (HYPERTENSION), BENIGN: Primary | ICD-10-CM

## 2019-09-09 DIAGNOSIS — M85.89 OSTEOPENIA OF MULTIPLE SITES: Chronic | ICD-10-CM

## 2019-09-09 DIAGNOSIS — R06.09 DYSPNEA ON EXERTION: Chronic | ICD-10-CM

## 2019-09-09 DIAGNOSIS — Z23 NEED FOR SHINGLES VACCINE: ICD-10-CM

## 2019-09-09 PROCEDURE — 4040F PNEUMOC VAC/ADMIN/RCVD: CPT | Performed by: FAMILY MEDICINE

## 2019-09-09 PROCEDURE — G8419 CALC BMI OUT NRM PARAM NOF/U: HCPCS | Performed by: FAMILY MEDICINE

## 2019-09-09 PROCEDURE — 1090F PRES/ABSN URINE INCON ASSESS: CPT | Performed by: FAMILY MEDICINE

## 2019-09-09 PROCEDURE — 3023F SPIROM DOC REV: CPT | Performed by: FAMILY MEDICINE

## 2019-09-09 PROCEDURE — 99214 OFFICE O/P EST MOD 30 MIN: CPT | Performed by: FAMILY MEDICINE

## 2019-09-09 PROCEDURE — G8399 PT W/DXA RESULTS DOCUMENT: HCPCS | Performed by: FAMILY MEDICINE

## 2019-09-09 PROCEDURE — G8926 SPIRO NO PERF OR DOC: HCPCS | Performed by: FAMILY MEDICINE

## 2019-09-09 PROCEDURE — 1036F TOBACCO NON-USER: CPT | Performed by: FAMILY MEDICINE

## 2019-09-09 PROCEDURE — G8427 DOCREV CUR MEDS BY ELIG CLIN: HCPCS | Performed by: FAMILY MEDICINE

## 2019-09-09 PROCEDURE — 1123F ACP DISCUSS/DSCN MKR DOCD: CPT | Performed by: FAMILY MEDICINE

## 2019-09-09 RX ORDER — ANTACID TABLETS 648 MG/1
TABLET, CHEWABLE ORAL
Refills: 11 | COMMUNITY
Start: 2019-07-31 | End: 2019-09-09 | Stop reason: SDUPTHER

## 2019-09-09 RX ORDER — ALENDRONATE SODIUM 70 MG/1
70 TABLET ORAL
Qty: 12 TABLET | Refills: 3 | Status: SHIPPED | OUTPATIENT
Start: 2019-09-09 | End: 2020-01-01 | Stop reason: SDUPTHER

## 2019-09-09 RX ORDER — FLUTICASONE PROPIONATE 50 MCG
2 SPRAY, SUSPENSION (ML) NASAL DAILY
Qty: 1 BOTTLE | Refills: 5 | Status: SHIPPED | OUTPATIENT
Start: 2019-09-09 | End: 2020-01-01 | Stop reason: SDUPTHER

## 2019-09-09 NOTE — PROGRESS NOTES
Medical: Not on file     Non-medical: Not on file   Tobacco Use    Smoking status: Former Smoker     Packs/day: 0.75     Years: 45.00     Pack years: 33.75     Types: Cigarettes     Start date:      Last attempt to quit: 2019     Years since quittin.6    Smokeless tobacco: Never Used   Substance and Sexual Activity    Alcohol use: No    Drug use: No    Sexual activity: Not on file   Lifestyle    Physical activity:     Days per week: Not on file     Minutes per session: Not on file    Stress: Not on file   Relationships    Social connections:     Talks on phone: Not on file     Gets together: Not on file     Attends Jainism service: Not on file     Active member of club or organization: Not on file     Attends meetings of clubs or organizations: Not on file     Relationship status: Not on file    Intimate partner violence:     Fear of current or ex partner: Not on file     Emotionally abused: Not on file     Physically abused: Not on file     Forced sexual activity: Not on file   Other Topics Concern    Not on file   Social History Narrative    Not on file     Family History   Problem Relation Age of Onset    Diabetes Other         GRANDMOTHER     No Known Allergies  Current Outpatient Medications   Medication Sig Dispense Refill    fluticasone (FLONASE) 50 MCG/ACT nasal spray 2 sprays by Nasal route daily 1 Bottle 5    alendronate (FOSAMAX) 70 MG tablet Take 1 tablet by mouth every 7 days 12 tablet 3    zoster recombinant adjuvanted vaccine (SHINGRIX) 50 MCG/0.5ML SUSR injection Inject 0.5 mLs into the muscle See Admin Instructions 1 dose now and repeat in 2-6 months 0.5 mL 0    calcium carbonate 648 MG TABS Take 1 tablet by mouth 2 times daily 60 tablet 11    amLODIPine (NORVASC) 5 MG tablet TAKE 1 TABLET BY MOUTH DAILY. 90 tablet 1    coenzyme Q10 100 MG CAPS capsule Take 100 mg by mouth daily      Ascorbic Acid (VITAMIN C) 500 MG tablet Take 500 mg by mouth daily.         vitamin Refill:  3    zoster recombinant adjuvanted vaccine (SHINGRIX) 50 MCG/0.5ML SUSR injection     Sig: Inject 0.5 mLs into the muscle See Admin Instructions 1 dose now and repeat in 2-6 months     Dispense:  0.5 mL     Refill:  0     Medications Discontinued During This Encounter   Medication Reason    calcium carbonate 181 MG TABS DUPLICATE    fexofenadine (ALLEGRA ALLERGY) 180 MG tablet LIST CLEANUP    OMEGA-3 KRILL  MG CAPS Therapy completed    alendronate (FOSAMAX) 70 MG tablet     zoster recombinant adjuvanted vaccine [facility]) 50 MCG/0.5ML SUSR injection REORDER     Return in 6 months (on 3/9/2020) for HTN. Controlled Substance Monitoring:    Acute and Chronic Pain Monitoring:   No flowsheet data found.         Amish Pal MD

## 2019-11-08 ENCOUNTER — TELEPHONE (OUTPATIENT)
Dept: FAMILY MEDICINE CLINIC | Age: 79
End: 2019-11-08

## 2019-11-12 ENCOUNTER — APPOINTMENT (OUTPATIENT)
Dept: MRI IMAGING | Age: 79
DRG: 066 | End: 2019-11-12
Payer: MEDICARE

## 2019-11-12 ENCOUNTER — APPOINTMENT (OUTPATIENT)
Dept: CT IMAGING | Age: 79
DRG: 066 | End: 2019-11-12
Payer: MEDICARE

## 2019-11-12 ENCOUNTER — HOSPITAL ENCOUNTER (INPATIENT)
Age: 79
LOS: 1 days | Discharge: NURSING, REHAB OR LONG-TERM CARE | DRG: 066 | End: 2019-11-14
Attending: EMERGENCY MEDICINE | Admitting: INTERNAL MEDICINE
Payer: MEDICARE

## 2019-11-12 ENCOUNTER — APPOINTMENT (OUTPATIENT)
Dept: GENERAL RADIOLOGY | Age: 79
DRG: 066 | End: 2019-11-12
Payer: MEDICARE

## 2019-11-12 DIAGNOSIS — I63.9 CEREBROVASCULAR ACCIDENT (CVA), UNSPECIFIED MECHANISM (HCC): Primary | ICD-10-CM

## 2019-11-12 PROBLEM — R29.90 STROKE-LIKE EPISODE: Status: ACTIVE | Noted: 2019-11-12

## 2019-11-12 LAB
ALBUMIN SERPL-MCNC: 4 G/DL (ref 3.5–4.6)
ALP BLD-CCNC: 88 U/L (ref 40–130)
ALT SERPL-CCNC: 10 U/L (ref 0–33)
ANION GAP SERPL CALCULATED.3IONS-SCNC: 10 MEQ/L (ref 9–15)
APTT: 33.6 SEC (ref 24.4–36.8)
AST SERPL-CCNC: 16 U/L (ref 0–35)
BACTERIA: NEGATIVE /HPF
BASOPHILS ABSOLUTE: 0 K/UL (ref 0–0.2)
BASOPHILS RELATIVE PERCENT: 0.9 %
BILIRUB SERPL-MCNC: 0.3 MG/DL (ref 0.2–0.7)
BILIRUBIN URINE: NEGATIVE
BLOOD, URINE: NEGATIVE
BUN BLDV-MCNC: 19 MG/DL (ref 8–23)
CALCIUM SERPL-MCNC: 9.7 MG/DL (ref 8.5–9.9)
CHLORIDE BLD-SCNC: 106 MEQ/L (ref 95–107)
CLARITY: CLEAR
CO2: 26 MEQ/L (ref 20–31)
COLOR: YELLOW
CREAT SERPL-MCNC: 0.87 MG/DL (ref 0.5–0.9)
EKG ATRIAL RATE: 83 BPM
EKG P AXIS: 64 DEGREES
EKG P-R INTERVAL: 136 MS
EKG Q-T INTERVAL: 414 MS
EKG QRS DURATION: 70 MS
EKG QTC CALCULATION (BAZETT): 486 MS
EKG R AXIS: -62 DEGREES
EKG T AXIS: 59 DEGREES
EKG VENTRICULAR RATE: 83 BPM
EOSINOPHILS ABSOLUTE: 0.2 K/UL (ref 0–0.7)
EOSINOPHILS RELATIVE PERCENT: 3.4 %
EPITHELIAL CELLS, UA: ABNORMAL /HPF (ref 0–5)
GFR AFRICAN AMERICAN: >60
GFR NON-AFRICAN AMERICAN: >60
GLOBULIN: 3.1 G/DL (ref 2.3–3.5)
GLUCOSE BLD-MCNC: 90 MG/DL (ref 60–115)
GLUCOSE BLD-MCNC: 94 MG/DL (ref 70–99)
GLUCOSE URINE: NEGATIVE MG/DL
HCT VFR BLD CALC: 41.1 % (ref 37–47)
HEMOGLOBIN: 13.7 G/DL (ref 12–16)
HYALINE CASTS: ABNORMAL /HPF (ref 0–5)
INR BLD: 0.9
KETONES, URINE: ABNORMAL MG/DL
LEUKOCYTE ESTERASE, URINE: ABNORMAL
LYMPHOCYTES ABSOLUTE: 0.9 K/UL (ref 1–4.8)
LYMPHOCYTES RELATIVE PERCENT: 17.6 %
MAGNESIUM: 2.1 MG/DL (ref 1.7–2.4)
MCH RBC QN AUTO: 30.8 PG (ref 27–31.3)
MCHC RBC AUTO-ENTMCNC: 33.3 % (ref 33–37)
MCV RBC AUTO: 92.6 FL (ref 82–100)
MONOCYTES ABSOLUTE: 0.5 K/UL (ref 0.2–0.8)
MONOCYTES RELATIVE PERCENT: 9.8 %
NEUTROPHILS ABSOLUTE: 3.4 K/UL (ref 1.4–6.5)
NEUTROPHILS RELATIVE PERCENT: 68.3 %
NITRITE, URINE: NEGATIVE
PDW BLD-RTO: 14 % (ref 11.5–14.5)
PERFORMED ON: NORMAL
PH UA: 5 (ref 5–9)
PLATELET # BLD: 273 K/UL (ref 130–400)
POTASSIUM SERPL-SCNC: 4 MEQ/L (ref 3.4–4.9)
PROTEIN UA: NEGATIVE MG/DL
PROTHROMBIN TIME: 13 SEC (ref 12.3–14.9)
RBC # BLD: 4.44 M/UL (ref 4.2–5.4)
RBC UA: ABNORMAL /HPF (ref 0–5)
SODIUM BLD-SCNC: 142 MEQ/L (ref 135–144)
SPECIFIC GRAVITY UA: 1.05 (ref 1–1.03)
TOTAL PROTEIN: 7.1 G/DL (ref 6.3–8)
TROPONIN: <0.01 NG/ML (ref 0–0.01)
URINE REFLEX TO CULTURE: YES
UROBILINOGEN, URINE: 0.2 E.U./DL
WBC # BLD: 4.9 K/UL (ref 4.8–10.8)
WBC UA: ABNORMAL /HPF (ref 0–5)

## 2019-11-12 PROCEDURE — 70551 MRI BRAIN STEM W/O DYE: CPT

## 2019-11-12 PROCEDURE — 70450 CT HEAD/BRAIN W/O DYE: CPT

## 2019-11-12 PROCEDURE — 6370000000 HC RX 637 (ALT 250 FOR IP): Performed by: NURSE PRACTITIONER

## 2019-11-12 PROCEDURE — 6360000002 HC RX W HCPCS: Performed by: NURSE PRACTITIONER

## 2019-11-12 PROCEDURE — 96372 THER/PROPH/DIAG INJ SC/IM: CPT

## 2019-11-12 PROCEDURE — 87077 CULTURE AEROBIC IDENTIFY: CPT

## 2019-11-12 PROCEDURE — 36415 COLL VENOUS BLD VENIPUNCTURE: CPT

## 2019-11-12 PROCEDURE — 6360000004 HC RX CONTRAST MEDICATION: Performed by: EMERGENCY MEDICINE

## 2019-11-12 PROCEDURE — G0378 HOSPITAL OBSERVATION PER HR: HCPCS

## 2019-11-12 PROCEDURE — 2580000003 HC RX 258: Performed by: EMERGENCY MEDICINE

## 2019-11-12 PROCEDURE — 99285 EMERGENCY DEPT VISIT HI MDM: CPT

## 2019-11-12 PROCEDURE — 81001 URINALYSIS AUTO W/SCOPE: CPT

## 2019-11-12 PROCEDURE — 80053 COMPREHEN METABOLIC PANEL: CPT

## 2019-11-12 PROCEDURE — 85610 PROTHROMBIN TIME: CPT

## 2019-11-12 PROCEDURE — 93005 ELECTROCARDIOGRAM TRACING: CPT | Performed by: EMERGENCY MEDICINE

## 2019-11-12 PROCEDURE — 87186 SC STD MICRODIL/AGAR DIL: CPT

## 2019-11-12 PROCEDURE — 85025 COMPLETE CBC W/AUTO DIFF WBC: CPT

## 2019-11-12 PROCEDURE — 70498 CT ANGIOGRAPHY NECK: CPT

## 2019-11-12 PROCEDURE — 70496 CT ANGIOGRAPHY HEAD: CPT

## 2019-11-12 PROCEDURE — 87086 URINE CULTURE/COLONY COUNT: CPT

## 2019-11-12 PROCEDURE — 83735 ASSAY OF MAGNESIUM: CPT

## 2019-11-12 PROCEDURE — 84484 ASSAY OF TROPONIN QUANT: CPT

## 2019-11-12 PROCEDURE — 6370000000 HC RX 637 (ALT 250 FOR IP): Performed by: EMERGENCY MEDICINE

## 2019-11-12 PROCEDURE — 71045 X-RAY EXAM CHEST 1 VIEW: CPT

## 2019-11-12 PROCEDURE — 85730 THROMBOPLASTIN TIME PARTIAL: CPT

## 2019-11-12 RX ORDER — AMLODIPINE BESYLATE 5 MG/1
5 TABLET ORAL DAILY
Status: DISCONTINUED | OUTPATIENT
Start: 2019-11-13 | End: 2019-11-14 | Stop reason: HOSPADM

## 2019-11-12 RX ORDER — ASPIRIN 81 MG/1
81 TABLET ORAL DAILY
Status: DISCONTINUED | OUTPATIENT
Start: 2019-11-12 | End: 2019-11-14 | Stop reason: HOSPADM

## 2019-11-12 RX ORDER — 0.9 % SODIUM CHLORIDE 0.9 %
1000 INTRAVENOUS SOLUTION INTRAVENOUS ONCE
Status: COMPLETED | OUTPATIENT
Start: 2019-11-12 | End: 2019-11-12

## 2019-11-12 RX ORDER — ONDANSETRON 2 MG/ML
4 INJECTION INTRAMUSCULAR; INTRAVENOUS EVERY 6 HOURS PRN
Status: DISCONTINUED | OUTPATIENT
Start: 2019-11-12 | End: 2019-11-14 | Stop reason: HOSPADM

## 2019-11-12 RX ORDER — FLUTICASONE PROPIONATE 50 MCG
2 SPRAY, SUSPENSION (ML) NASAL DAILY
Status: DISCONTINUED | OUTPATIENT
Start: 2019-11-13 | End: 2019-11-14 | Stop reason: HOSPADM

## 2019-11-12 RX ORDER — ASPIRIN 81 MG/1
324 TABLET, CHEWABLE ORAL ONCE
Status: COMPLETED | OUTPATIENT
Start: 2019-11-12 | End: 2019-11-12

## 2019-11-12 RX ORDER — ASCORBIC ACID 500 MG
500 TABLET ORAL DAILY
Status: DISCONTINUED | OUTPATIENT
Start: 2019-11-13 | End: 2019-11-14 | Stop reason: HOSPADM

## 2019-11-12 RX ORDER — CALCIUM CARBONATE 200(500)MG
1 TABLET,CHEWABLE ORAL 2 TIMES DAILY
Status: DISCONTINUED | OUTPATIENT
Start: 2019-11-12 | End: 2019-11-14 | Stop reason: HOSPADM

## 2019-11-12 RX ORDER — ASPIRIN 300 MG/1
300 SUPPOSITORY RECTAL DAILY
Status: DISCONTINUED | OUTPATIENT
Start: 2019-11-12 | End: 2019-11-14 | Stop reason: HOSPADM

## 2019-11-12 RX ORDER — SODIUM CHLORIDE 0.9 % (FLUSH) 0.9 %
10 SYRINGE (ML) INJECTION EVERY 12 HOURS SCHEDULED
Status: DISCONTINUED | OUTPATIENT
Start: 2019-11-12 | End: 2019-11-14 | Stop reason: HOSPADM

## 2019-11-12 RX ORDER — UBIDECARENONE 100 MG
100 CAPSULE ORAL DAILY
Status: DISCONTINUED | OUTPATIENT
Start: 2019-11-13 | End: 2019-11-14 | Stop reason: HOSPADM

## 2019-11-12 RX ORDER — ROSUVASTATIN CALCIUM 40 MG/1
40 TABLET, COATED ORAL NIGHTLY
Status: DISCONTINUED | OUTPATIENT
Start: 2019-11-12 | End: 2019-11-13 | Stop reason: SDUPTHER

## 2019-11-12 RX ORDER — SODIUM CHLORIDE 0.9 % (FLUSH) 0.9 %
10 SYRINGE (ML) INJECTION PRN
Status: DISCONTINUED | OUTPATIENT
Start: 2019-11-12 | End: 2019-11-14 | Stop reason: HOSPADM

## 2019-11-12 RX ORDER — VITAMIN B COMPLEX
1000 TABLET ORAL DAILY
Status: DISCONTINUED | OUTPATIENT
Start: 2019-11-13 | End: 2019-11-14 | Stop reason: HOSPADM

## 2019-11-12 RX ADMIN — ASPIRIN 81 MG 324 MG: 81 TABLET ORAL at 16:22

## 2019-11-12 RX ADMIN — IOPAMIDOL 100 ML: 612 INJECTION, SOLUTION INTRAVENOUS at 16:37

## 2019-11-12 RX ADMIN — ROSUVASTATIN 40 MG: 40 TABLET, FILM COATED ORAL at 22:11

## 2019-11-12 RX ADMIN — ASPIRIN 81 MG: 81 TABLET, COATED ORAL at 22:11

## 2019-11-12 RX ADMIN — ENOXAPARIN SODIUM 30 MG: 30 INJECTION SUBCUTANEOUS at 22:11

## 2019-11-12 RX ADMIN — SODIUM CHLORIDE 1000 ML: 9 INJECTION, SOLUTION INTRAVENOUS at 16:22

## 2019-11-12 ASSESSMENT — ENCOUNTER SYMPTOMS
NAUSEA: 0
SHORTNESS OF BREATH: 0
COUGH: 0
BACK PAIN: 0
ABDOMINAL PAIN: 0
VOMITING: 0
DIARRHEA: 0
SORE THROAT: 0

## 2019-11-13 LAB
ANION GAP SERPL CALCULATED.3IONS-SCNC: 8 MEQ/L (ref 9–15)
BUN BLDV-MCNC: 21 MG/DL (ref 8–23)
CALCIUM SERPL-MCNC: 9.3 MG/DL (ref 8.5–9.9)
CHLORIDE BLD-SCNC: 107 MEQ/L (ref 95–107)
CO2: 29 MEQ/L (ref 20–31)
CREAT SERPL-MCNC: 0.66 MG/DL (ref 0.5–0.9)
FOLATE: 16.8 NG/ML (ref 7.3–26.1)
GFR AFRICAN AMERICAN: >60
GFR NON-AFRICAN AMERICAN: >60
GLUCOSE BLD-MCNC: 89 MG/DL (ref 70–99)
HCT VFR BLD CALC: 40.1 % (ref 37–47)
HEMOGLOBIN: 13 G/DL (ref 12–16)
HOMOCYSTEINE: 9.3 UMOL/L (ref 0–15)
LV EF: 60 %
LVEF MODALITY: NORMAL
MCH RBC QN AUTO: 30.3 PG (ref 27–31.3)
MCHC RBC AUTO-ENTMCNC: 32.5 % (ref 33–37)
MCV RBC AUTO: 93.4 FL (ref 82–100)
PDW BLD-RTO: 13.9 % (ref 11.5–14.5)
PLATELET # BLD: 296 K/UL (ref 130–400)
POTASSIUM REFLEX MAGNESIUM: 3.6 MEQ/L (ref 3.4–4.9)
RBC # BLD: 4.29 M/UL (ref 4.2–5.4)
SODIUM BLD-SCNC: 144 MEQ/L (ref 135–144)
VITAMIN B-12: 276 PG/ML (ref 232–1245)
WBC # BLD: 4 K/UL (ref 4.8–10.8)

## 2019-11-13 PROCEDURE — 97162 PT EVAL MOD COMPLEX 30 MIN: CPT

## 2019-11-13 PROCEDURE — 93005 ELECTROCARDIOGRAM TRACING: CPT | Performed by: NURSE PRACTITIONER

## 2019-11-13 PROCEDURE — 82746 ASSAY OF FOLIC ACID SERUM: CPT

## 2019-11-13 PROCEDURE — 6370000000 HC RX 637 (ALT 250 FOR IP): Performed by: FAMILY MEDICINE

## 2019-11-13 PROCEDURE — 99223 1ST HOSP IP/OBS HIGH 75: CPT | Performed by: PSYCHIATRY & NEUROLOGY

## 2019-11-13 PROCEDURE — 80061 LIPID PANEL: CPT

## 2019-11-13 PROCEDURE — 36415 COLL VENOUS BLD VENIPUNCTURE: CPT

## 2019-11-13 PROCEDURE — 83036 HEMOGLOBIN GLYCOSYLATED A1C: CPT

## 2019-11-13 PROCEDURE — 97166 OT EVAL MOD COMPLEX 45 MIN: CPT

## 2019-11-13 PROCEDURE — 1210000000 HC MED SURG R&B

## 2019-11-13 PROCEDURE — 80048 BASIC METABOLIC PNL TOTAL CA: CPT

## 2019-11-13 PROCEDURE — 6360000002 HC RX W HCPCS: Performed by: NURSE PRACTITIONER

## 2019-11-13 PROCEDURE — 6370000000 HC RX 637 (ALT 250 FOR IP): Performed by: NURSE PRACTITIONER

## 2019-11-13 PROCEDURE — 6370000000 HC RX 637 (ALT 250 FOR IP): Performed by: PSYCHIATRY & NEUROLOGY

## 2019-11-13 PROCEDURE — 93306 TTE W/DOPPLER COMPLETE: CPT

## 2019-11-13 PROCEDURE — 85027 COMPLETE CBC AUTOMATED: CPT

## 2019-11-13 PROCEDURE — 83090 ASSAY OF HOMOCYSTEINE: CPT

## 2019-11-13 PROCEDURE — 82607 VITAMIN B-12: CPT

## 2019-11-13 PROCEDURE — 2580000003 HC RX 258: Performed by: NURSE PRACTITIONER

## 2019-11-13 PROCEDURE — 96372 THER/PROPH/DIAG INJ SC/IM: CPT

## 2019-11-13 RX ORDER — CLONIDINE HYDROCHLORIDE 0.1 MG/1
0.1 TABLET ORAL ONCE
Status: COMPLETED | OUTPATIENT
Start: 2019-11-13 | End: 2019-11-13

## 2019-11-13 RX ORDER — ATORVASTATIN CALCIUM 20 MG/1
20 TABLET, FILM COATED ORAL NIGHTLY
Status: DISCONTINUED | OUTPATIENT
Start: 2019-11-13 | End: 2019-11-14 | Stop reason: HOSPADM

## 2019-11-13 RX ORDER — ATORVASTATIN CALCIUM 20 MG/1
20 TABLET, FILM COATED ORAL NIGHTLY
Qty: 30 TABLET | Refills: 3 | Status: SHIPPED | OUTPATIENT
Start: 2019-11-13 | End: 2019-11-25

## 2019-11-13 RX ORDER — ASPIRIN 81 MG/1
81 TABLET ORAL DAILY
Qty: 30 TABLET | Refills: 3 | Status: ON HOLD | OUTPATIENT
Start: 2019-11-14 | End: 2021-01-01 | Stop reason: HOSPADM

## 2019-11-13 RX ADMIN — Medication 100 MG: at 09:10

## 2019-11-13 RX ADMIN — OXYCODONE HYDROCHLORIDE AND ACETAMINOPHEN 500 MG: 500 TABLET ORAL at 09:10

## 2019-11-13 RX ADMIN — CLONIDINE HYDROCHLORIDE 0.1 MG: 0.1 TABLET ORAL at 20:29

## 2019-11-13 RX ADMIN — ATORVASTATIN CALCIUM 20 MG: 20 TABLET, FILM COATED ORAL at 20:29

## 2019-11-13 RX ADMIN — VITAMIN D, TAB 1000IU (100/BT) 1000 UNITS: 25 TAB at 09:10

## 2019-11-13 RX ADMIN — Medication 10 ML: at 09:11

## 2019-11-13 RX ADMIN — AMLODIPINE BESYLATE 5 MG: 5 TABLET ORAL at 09:12

## 2019-11-13 RX ADMIN — ANTACID TABLETS 500 MG: 500 TABLET, CHEWABLE ORAL at 09:10

## 2019-11-13 RX ADMIN — ASPIRIN 81 MG: 81 TABLET, COATED ORAL at 09:10

## 2019-11-13 RX ADMIN — ENOXAPARIN SODIUM 30 MG: 30 INJECTION SUBCUTANEOUS at 09:09

## 2019-11-13 ASSESSMENT — ENCOUNTER SYMPTOMS
PHOTOPHOBIA: 0
NAUSEA: 0
VOMITING: 0
SHORTNESS OF BREATH: 0
BACK PAIN: 0
TROUBLE SWALLOWING: 0
CHOKING: 0

## 2019-11-13 ASSESSMENT — PAIN SCALES - GENERAL
PAINLEVEL_OUTOF10: 0

## 2019-11-14 ENCOUNTER — HOSPITAL ENCOUNTER (INPATIENT)
Age: 79
LOS: 10 days | Discharge: HOME HEALTH | DRG: 057 | End: 2019-11-24
Attending: PHYSICAL MEDICINE & REHABILITATION | Admitting: PHYSICAL MEDICINE & REHABILITATION
Payer: MEDICARE

## 2019-11-14 VITALS
WEIGHT: 70.5 LBS | TEMPERATURE: 98.1 F | HEART RATE: 71 BPM | OXYGEN SATURATION: 93 % | DIASTOLIC BLOOD PRESSURE: 66 MMHG | SYSTOLIC BLOOD PRESSURE: 130 MMHG | HEIGHT: 66 IN | RESPIRATION RATE: 16 BRPM | BODY MASS INDEX: 11.33 KG/M2

## 2019-11-14 DIAGNOSIS — R41.89 COGNITIVE DECLINE: ICD-10-CM

## 2019-11-14 DIAGNOSIS — Z78.9 DECREASED ACTIVITIES OF DAILY LIVING (ADL): Primary | ICD-10-CM

## 2019-11-14 DIAGNOSIS — R26.0 ATAXIC GAIT: ICD-10-CM

## 2019-11-14 PROBLEM — I63.9 RIGHT-SIDED CEREBROVASCULAR ACCIDENT (CVA) (HCC): Status: ACTIVE | Noted: 2019-11-12

## 2019-11-14 PROBLEM — R26.9 ABNORMALITY OF GAIT AND MOBILITY: Status: ACTIVE | Noted: 2019-11-14

## 2019-11-14 PROBLEM — M85.89 OSTEOPENIA OF MULTIPLE SITES: Chronic | Status: RESOLVED | Noted: 2019-05-06 | Resolved: 2019-11-14

## 2019-11-14 LAB
CHOLESTEROL, TOTAL: 155 MG/DL (ref 0–199)
CHOLESTEROL, TOTAL: 185 MG/DL (ref 0–199)
HBA1C MFR BLD: 6 % (ref 4.8–5.9)
HDLC SERPL-MCNC: 50 MG/DL (ref 40–59)
HDLC SERPL-MCNC: 51 MG/DL (ref 40–59)
LDL CHOLESTEROL CALCULATED: 70 MG/DL (ref 0–129)
LDL CHOLESTEROL CALCULATED: 96 MG/DL (ref 0–129)
TRIGL SERPL-MCNC: 174 MG/DL (ref 0–150)
TRIGL SERPL-MCNC: 189 MG/DL (ref 0–150)

## 2019-11-14 PROCEDURE — 6360000002 HC RX W HCPCS: Performed by: NURSE PRACTITIONER

## 2019-11-14 PROCEDURE — 36415 COLL VENOUS BLD VENIPUNCTURE: CPT

## 2019-11-14 PROCEDURE — 6360000002 HC RX W HCPCS

## 2019-11-14 PROCEDURE — 93010 ELECTROCARDIOGRAM REPORT: CPT | Performed by: INTERNAL MEDICINE

## 2019-11-14 PROCEDURE — 97116 GAIT TRAINING THERAPY: CPT

## 2019-11-14 PROCEDURE — 1180000000 HC REHAB R&B

## 2019-11-14 PROCEDURE — 99233 SBSQ HOSP IP/OBS HIGH 50: CPT | Performed by: PSYCHIATRY & NEUROLOGY

## 2019-11-14 PROCEDURE — 6370000000 HC RX 637 (ALT 250 FOR IP): Performed by: NURSE PRACTITIONER

## 2019-11-14 PROCEDURE — 92523 SPEECH SOUND LANG COMPREHEN: CPT

## 2019-11-14 PROCEDURE — 99222 1ST HOSP IP/OBS MODERATE 55: CPT | Performed by: PHYSICAL MEDICINE & REHABILITATION

## 2019-11-14 PROCEDURE — G0008 ADMIN INFLUENZA VIRUS VAC: HCPCS

## 2019-11-14 PROCEDURE — 2580000003 HC RX 258: Performed by: NURSE PRACTITIONER

## 2019-11-14 PROCEDURE — 90686 IIV4 VACC NO PRSV 0.5 ML IM: CPT

## 2019-11-14 PROCEDURE — 6370000000 HC RX 637 (ALT 250 FOR IP): Performed by: INTERNAL MEDICINE

## 2019-11-14 PROCEDURE — 92610 EVALUATE SWALLOWING FUNCTION: CPT

## 2019-11-14 PROCEDURE — 80061 LIPID PANEL: CPT

## 2019-11-14 RX ORDER — FLUTICASONE PROPIONATE 50 MCG
2 SPRAY, SUSPENSION (ML) NASAL DAILY
Status: DISCONTINUED | OUTPATIENT
Start: 2019-11-15 | End: 2019-11-21

## 2019-11-14 RX ORDER — ASPIRIN 81 MG/1
81 TABLET ORAL DAILY
Status: CANCELLED | OUTPATIENT
Start: 2019-11-15

## 2019-11-14 RX ORDER — ATORVASTATIN CALCIUM 20 MG/1
20 TABLET, FILM COATED ORAL NIGHTLY
Status: DISCONTINUED | OUTPATIENT
Start: 2019-11-14 | End: 2019-11-24 | Stop reason: HOSPADM

## 2019-11-14 RX ORDER — UBIDECARENONE 100 MG
100 CAPSULE ORAL DAILY
Status: DISCONTINUED | OUTPATIENT
Start: 2019-11-15 | End: 2019-11-24 | Stop reason: HOSPADM

## 2019-11-14 RX ORDER — ATORVASTATIN CALCIUM 20 MG/1
20 TABLET, FILM COATED ORAL NIGHTLY
Status: CANCELLED | OUTPATIENT
Start: 2019-11-14

## 2019-11-14 RX ORDER — AMLODIPINE BESYLATE 5 MG/1
5 TABLET ORAL DAILY
Status: CANCELLED | OUTPATIENT
Start: 2019-11-15

## 2019-11-14 RX ORDER — VITAMIN B COMPLEX
1000 TABLET ORAL DAILY
Status: CANCELLED | OUTPATIENT
Start: 2019-11-15

## 2019-11-14 RX ORDER — AMLODIPINE BESYLATE 5 MG/1
5 TABLET ORAL DAILY
Status: DISCONTINUED | OUTPATIENT
Start: 2019-11-15 | End: 2019-11-15

## 2019-11-14 RX ORDER — CALCIUM CARBONATE 200(500)MG
1 TABLET,CHEWABLE ORAL 2 TIMES DAILY
Status: CANCELLED | OUTPATIENT
Start: 2019-11-14

## 2019-11-14 RX ORDER — FLUTICASONE PROPIONATE 50 MCG
2 SPRAY, SUSPENSION (ML) NASAL DAILY
Status: CANCELLED | OUTPATIENT
Start: 2019-11-15

## 2019-11-14 RX ORDER — ASPIRIN 300 MG/1
300 SUPPOSITORY RECTAL DAILY
Status: DISCONTINUED | OUTPATIENT
Start: 2019-11-15 | End: 2019-11-15 | Stop reason: DRUGHIGH

## 2019-11-14 RX ORDER — ASCORBIC ACID 500 MG
500 TABLET ORAL DAILY
Status: CANCELLED | OUTPATIENT
Start: 2019-11-15

## 2019-11-14 RX ORDER — ASPIRIN 81 MG/1
81 TABLET ORAL DAILY
Status: DISCONTINUED | OUTPATIENT
Start: 2019-11-15 | End: 2019-11-24 | Stop reason: HOSPADM

## 2019-11-14 RX ORDER — ASPIRIN 300 MG/1
300 SUPPOSITORY RECTAL DAILY
Status: CANCELLED | OUTPATIENT
Start: 2019-11-15

## 2019-11-14 RX ORDER — ASCORBIC ACID 500 MG
500 TABLET ORAL DAILY
Status: DISCONTINUED | OUTPATIENT
Start: 2019-11-15 | End: 2019-11-24 | Stop reason: HOSPADM

## 2019-11-14 RX ORDER — CALCIUM CARBONATE 200(500)MG
500 TABLET,CHEWABLE ORAL 2 TIMES DAILY
Status: DISCONTINUED | OUTPATIENT
Start: 2019-11-14 | End: 2019-11-24 | Stop reason: HOSPADM

## 2019-11-14 RX ORDER — UBIDECARENONE 100 MG
100 CAPSULE ORAL DAILY
Status: CANCELLED | OUTPATIENT
Start: 2019-11-15

## 2019-11-14 RX ORDER — VITAMIN B COMPLEX
1000 TABLET ORAL DAILY
Status: DISCONTINUED | OUTPATIENT
Start: 2019-11-15 | End: 2019-11-24 | Stop reason: HOSPADM

## 2019-11-14 RX ADMIN — VITAMIN D, TAB 1000IU (100/BT) 1000 UNITS: 25 TAB at 09:46

## 2019-11-14 RX ADMIN — ANTACID TABLETS 500 MG: 500 TABLET, CHEWABLE ORAL at 09:47

## 2019-11-14 RX ADMIN — OXYCODONE HYDROCHLORIDE AND ACETAMINOPHEN 500 MG: 500 TABLET ORAL at 09:47

## 2019-11-14 RX ADMIN — Medication 100 MG: at 09:47

## 2019-11-14 RX ADMIN — Medication 10 ML: at 09:49

## 2019-11-14 RX ADMIN — ATORVASTATIN CALCIUM 20 MG: 20 TABLET, FILM COATED ORAL at 21:46

## 2019-11-14 RX ADMIN — AMLODIPINE BESYLATE 5 MG: 5 TABLET ORAL at 09:46

## 2019-11-14 RX ADMIN — ASPIRIN 81 MG: 81 TABLET, COATED ORAL at 09:46

## 2019-11-14 RX ADMIN — INFLUENZA A VIRUS A/BRISBANE/02/2018 IVR-190 (H1N1) ANTIGEN (PROPIOLACTONE INACTIVATED), INFLUENZA A VIRUS A/KANSAS/14/2017 X-327 (H3N2) ANTIGEN (PROPIOLACTONE INACTIVATED), INFLUENZA B VIRUS B/MARYLAND/15/2016 ANTIGEN (PROPIOLACTONE INACTIVATED), INFLUENZA B VIRUS B/PHUKET/3073/2013 BVR-1B ANTIGEN (PROPIOLACTONE INACTIVATED) 0.5 ML: 15; 15; 15; 15 INJECTION, SUSPENSION INTRAMUSCULAR at 16:17

## 2019-11-14 RX ADMIN — ENOXAPARIN SODIUM 30 MG: 30 INJECTION SUBCUTANEOUS at 09:47

## 2019-11-14 SDOH — HEALTH STABILITY: MENTAL HEALTH
STRESS IS WHEN SOMEONE FEELS TENSE, NERVOUS, ANXIOUS, OR CAN'T SLEEP AT NIGHT BECAUSE THEIR MIND IS TROUBLED. HOW STRESSED ARE YOU?: ONLY A LITTLE

## 2019-11-14 SDOH — HEALTH STABILITY: PHYSICAL HEALTH: ON AVERAGE, HOW MANY MINUTES DO YOU ENGAGE IN EXERCISE AT THIS LEVEL?: 0 MIN

## 2019-11-14 SDOH — SOCIAL STABILITY: SOCIAL INSECURITY
WITHIN THE LAST YEAR, HAVE TO BEEN RAPED OR FORCED TO HAVE ANY KIND OF SEXUAL ACTIVITY BY YOUR PARTNER OR EX-PARTNER?: NO

## 2019-11-14 SDOH — SOCIAL STABILITY: SOCIAL INSECURITY: WITHIN THE LAST YEAR, HAVE YOU BEEN HUMILIATED OR EMOTIONALLY ABUSED IN OTHER WAYS BY YOUR PARTNER OR EX-PARTNER?: NO

## 2019-11-14 SDOH — SOCIAL STABILITY: SOCIAL NETWORK: HOW OFTEN DO YOU ATTENT MEETINGS OF THE CLUB OR ORGANIZATION YOU BELONG TO?: NEVER

## 2019-11-14 SDOH — ECONOMIC STABILITY: TRANSPORTATION INSECURITY
IN THE PAST 12 MONTHS, HAS LACK OF TRANSPORTATION KEPT YOU FROM MEETINGS, WORK, OR FROM GETTING THINGS NEEDED FOR DAILY LIVING?: NO

## 2019-11-14 SDOH — SOCIAL STABILITY: SOCIAL INSECURITY: WITHIN THE LAST YEAR, HAVE YOU BEEN AFRAID OF YOUR PARTNER OR EX-PARTNER?: NO

## 2019-11-14 SDOH — HEALTH STABILITY: PHYSICAL HEALTH: ON AVERAGE, HOW MANY DAYS PER WEEK DO YOU ENGAGE IN MODERATE TO STRENUOUS EXERCISE (LIKE A BRISK WALK)?: 0 DAYS

## 2019-11-14 SDOH — SOCIAL STABILITY: SOCIAL NETWORK: ARE YOU MARRIED, WIDOWED, DIVORCED, SEPARATED, NEVER MARRIED, OR LIVING WITH A PARTNER?: DIVORCED

## 2019-11-14 SDOH — SOCIAL STABILITY: SOCIAL INSECURITY
WITHIN THE LAST YEAR, HAVE YOU BEEN KICKED, HIT, SLAPPED, OR OTHERWISE PHYSICALLY HURT BY YOUR PARTNER OR EX-PARTNER?: NO

## 2019-11-14 SDOH — SOCIAL STABILITY: SOCIAL NETWORK
DO YOU BELONG TO ANY CLUBS OR ORGANIZATIONS SUCH AS CHURCH GROUPS UNIONS, FRATERNAL OR ATHLETIC GROUPS, OR SCHOOL GROUPS?: NO

## 2019-11-14 SDOH — ECONOMIC STABILITY: TRANSPORTATION INSECURITY
IN THE PAST 12 MONTHS, HAS THE LACK OF TRANSPORTATION KEPT YOU FROM MEDICAL APPOINTMENTS OR FROM GETTING MEDICATIONS?: NO

## 2019-11-14 SDOH — SOCIAL STABILITY: SOCIAL NETWORK: HOW OFTEN DO YOU ATTEND CHURCH OR RELIGIOUS SERVICES?: MORE THAN 4 TIMES PER YEAR

## 2019-11-14 SDOH — SOCIAL STABILITY: SOCIAL NETWORK
IN A TYPICAL WEEK, HOW MANY TIMES DO YOU TALK ON THE PHONE WITH FAMILY, FRIENDS, OR NEIGHBORS?: MORE THAN THREE TIMES A WEEK

## 2019-11-14 SDOH — ECONOMIC STABILITY: FOOD INSECURITY: WITHIN THE PAST 12 MONTHS, YOU WORRIED THAT YOUR FOOD WOULD RUN OUT BEFORE YOU GOT MONEY TO BUY MORE.: NEVER TRUE

## 2019-11-14 SDOH — SOCIAL STABILITY: SOCIAL NETWORK: HOW OFTEN DO YOU GET TOGETHER WITH FRIENDS OR RELATIVES?: MORE THAN THREE TIMES A WEEK

## 2019-11-14 ASSESSMENT — ENCOUNTER SYMPTOMS
CONSTIPATION: 0
CHEST TIGHTNESS: 0
COUGH: 0
TROUBLE SWALLOWING: 0
NAUSEA: 0
COLOR CHANGE: 0
ABDOMINAL DISTENTION: 0
EYE PAIN: 0
SHORTNESS OF BREATH: 0
EYE REDNESS: 0
PHOTOPHOBIA: 0
VOMITING: 0
CHOKING: 0
FACIAL SWELLING: 0
ABDOMINAL PAIN: 0
BLOOD IN STOOL: 0
WHEEZING: 0
ANAL BLEEDING: 0

## 2019-11-14 ASSESSMENT — PAIN SCALES - GENERAL
PAINLEVEL_OUTOF10: 0
PAINLEVEL_OUTOF10: 0

## 2019-11-15 ENCOUNTER — CARE COORDINATION (OUTPATIENT)
Dept: CASE MANAGEMENT | Age: 79
End: 2019-11-15

## 2019-11-15 PROBLEM — H35.30 MACULAR DEGENERATION OF RIGHT EYE: Status: ACTIVE | Noted: 2019-11-15

## 2019-11-15 LAB
BACTERIA: NEGATIVE /HPF
BILIRUBIN URINE: NEGATIVE
BLOOD, URINE: NEGATIVE
CLARITY: CLEAR
COLOR: YELLOW
EPITHELIAL CELLS, UA: ABNORMAL /HPF (ref 0–5)
GLUCOSE URINE: NEGATIVE MG/DL
HYALINE CASTS: ABNORMAL /HPF (ref 0–5)
KETONES, URINE: NEGATIVE MG/DL
LEUKOCYTE ESTERASE, URINE: ABNORMAL
NITRITE, URINE: NEGATIVE
ORGANISM: ABNORMAL
PH UA: 5.5 (ref 5–9)
PROTEIN UA: NEGATIVE MG/DL
RBC UA: ABNORMAL /HPF (ref 0–5)
SPECIFIC GRAVITY UA: 1.02 (ref 1–1.03)
URINE CULTURE, ROUTINE: ABNORMAL
UROBILINOGEN, URINE: 0.2 E.U./DL
WBC UA: ABNORMAL /HPF (ref 0–5)

## 2019-11-15 PROCEDURE — 97530 THERAPEUTIC ACTIVITIES: CPT

## 2019-11-15 PROCEDURE — 97166 OT EVAL MOD COMPLEX 45 MIN: CPT

## 2019-11-15 PROCEDURE — 92610 EVALUATE SWALLOWING FUNCTION: CPT

## 2019-11-15 PROCEDURE — 6370000000 HC RX 637 (ALT 250 FOR IP): Performed by: INTERNAL MEDICINE

## 2019-11-15 PROCEDURE — 92523 SPEECH SOUND LANG COMPREHEN: CPT

## 2019-11-15 PROCEDURE — 97162 PT EVAL MOD COMPLEX 30 MIN: CPT

## 2019-11-15 PROCEDURE — 99222 1ST HOSP IP/OBS MODERATE 55: CPT | Performed by: PHYSICAL MEDICINE & REHABILITATION

## 2019-11-15 PROCEDURE — 97127 HC OT THER IVNTJ W/FOCUS COG FUNCJ: CPT

## 2019-11-15 PROCEDURE — 97112 NEUROMUSCULAR REEDUCATION: CPT

## 2019-11-15 PROCEDURE — 87086 URINE CULTURE/COLONY COUNT: CPT

## 2019-11-15 PROCEDURE — 97116 GAIT TRAINING THERAPY: CPT

## 2019-11-15 PROCEDURE — 1180000000 HC REHAB R&B

## 2019-11-15 PROCEDURE — 81001 URINALYSIS AUTO W/SCOPE: CPT

## 2019-11-15 PROCEDURE — 6370000000 HC RX 637 (ALT 250 FOR IP): Performed by: PHYSICAL MEDICINE & REHABILITATION

## 2019-11-15 PROCEDURE — 6360000002 HC RX W HCPCS: Performed by: INTERNAL MEDICINE

## 2019-11-15 RX ORDER — AMLODIPINE BESYLATE 5 MG/1
5 TABLET ORAL DAILY
Status: DISCONTINUED | OUTPATIENT
Start: 2019-11-15 | End: 2019-11-15

## 2019-11-15 RX ORDER — AMLODIPINE BESYLATE 5 MG/1
5 TABLET ORAL 2 TIMES DAILY
Status: DISCONTINUED | OUTPATIENT
Start: 2019-11-15 | End: 2019-11-24 | Stop reason: HOSPADM

## 2019-11-15 RX ORDER — ACETAMINOPHEN 325 MG/1
325 TABLET ORAL EVERY 4 HOURS PRN
Status: DISCONTINUED | OUTPATIENT
Start: 2019-11-15 | End: 2019-11-24 | Stop reason: HOSPADM

## 2019-11-15 RX ORDER — NITROFURANTOIN 25; 75 MG/1; MG/1
100 CAPSULE ORAL DAILY
Status: COMPLETED | OUTPATIENT
Start: 2019-11-15 | End: 2019-11-21

## 2019-11-15 RX ADMIN — ASPIRIN 81 MG: 81 TABLET, COATED ORAL at 09:11

## 2019-11-15 RX ADMIN — Medication 100 MG: at 09:09

## 2019-11-15 RX ADMIN — OXYCODONE HYDROCHLORIDE AND ACETAMINOPHEN 500 MG: 500 TABLET ORAL at 09:08

## 2019-11-15 RX ADMIN — ANTACID TABLETS 500 MG: 500 TABLET, CHEWABLE ORAL at 09:08

## 2019-11-15 RX ADMIN — VITAMIN D, TAB 1000IU (100/BT) 1000 UNITS: 25 TAB at 09:08

## 2019-11-15 RX ADMIN — FLUTICASONE PROPIONATE 2 SPRAY: 50 SPRAY, METERED NASAL at 09:12

## 2019-11-15 RX ADMIN — AMLODIPINE BESYLATE 5 MG: 5 TABLET ORAL at 20:33

## 2019-11-15 RX ADMIN — NITROFURANTOIN (MONOHYDRATE/MACROCRYSTALS) 100 MG: 75; 25 CAPSULE ORAL at 12:51

## 2019-11-15 RX ADMIN — ATORVASTATIN CALCIUM 20 MG: 20 TABLET, FILM COATED ORAL at 20:33

## 2019-11-15 RX ADMIN — AMLODIPINE BESYLATE 5 MG: 5 TABLET ORAL at 09:08

## 2019-11-15 RX ADMIN — ENOXAPARIN SODIUM 30 MG: 30 INJECTION SUBCUTANEOUS at 09:09

## 2019-11-15 ASSESSMENT — PAIN SCALES - GENERAL
PAINLEVEL_OUTOF10: 0

## 2019-11-15 ASSESSMENT — ENCOUNTER SYMPTOMS
EYE REDNESS: 0
BOWEL INCONTINENCE: 0
VOMITING: 0
SORE THROAT: 0
SHORTNESS OF BREATH: 1
COUGH: 0
EYE PAIN: 0
ABDOMINAL PAIN: 0
NAUSEA: 0
DIARRHEA: 0
STRIDOR: 0
BACK PAIN: 1
BLOOD IN STOOL: 0
CONSTIPATION: 1
WHEEZING: 0
PHOTOPHOBIA: 0

## 2019-11-16 LAB — URINE CULTURE, ROUTINE: NORMAL

## 2019-11-16 PROCEDURE — 97535 SELF CARE MNGMENT TRAINING: CPT

## 2019-11-16 PROCEDURE — 97530 THERAPEUTIC ACTIVITIES: CPT

## 2019-11-16 PROCEDURE — 6360000002 HC RX W HCPCS: Performed by: INTERNAL MEDICINE

## 2019-11-16 PROCEDURE — 97110 THERAPEUTIC EXERCISES: CPT

## 2019-11-16 PROCEDURE — 97112 NEUROMUSCULAR REEDUCATION: CPT

## 2019-11-16 PROCEDURE — 6370000000 HC RX 637 (ALT 250 FOR IP): Performed by: INTERNAL MEDICINE

## 2019-11-16 PROCEDURE — 6370000000 HC RX 637 (ALT 250 FOR IP): Performed by: PHYSICAL MEDICINE & REHABILITATION

## 2019-11-16 PROCEDURE — 97116 GAIT TRAINING THERAPY: CPT

## 2019-11-16 PROCEDURE — 1180000000 HC REHAB R&B

## 2019-11-16 RX ADMIN — ASPIRIN 81 MG: 81 TABLET, COATED ORAL at 08:57

## 2019-11-16 RX ADMIN — FLUTICASONE PROPIONATE 2 SPRAY: 50 SPRAY, METERED NASAL at 09:02

## 2019-11-16 RX ADMIN — OXYCODONE HYDROCHLORIDE AND ACETAMINOPHEN 500 MG: 500 TABLET ORAL at 08:57

## 2019-11-16 RX ADMIN — AMLODIPINE BESYLATE 5 MG: 5 TABLET ORAL at 20:51

## 2019-11-16 RX ADMIN — ANTACID TABLETS 500 MG: 500 TABLET, CHEWABLE ORAL at 20:51

## 2019-11-16 RX ADMIN — NITROFURANTOIN (MONOHYDRATE/MACROCRYSTALS) 100 MG: 75; 25 CAPSULE ORAL at 08:57

## 2019-11-16 RX ADMIN — ATORVASTATIN CALCIUM 20 MG: 20 TABLET, FILM COATED ORAL at 20:51

## 2019-11-16 RX ADMIN — ENOXAPARIN SODIUM 30 MG: 30 INJECTION SUBCUTANEOUS at 08:58

## 2019-11-16 RX ADMIN — Medication 100 MG: at 08:57

## 2019-11-16 RX ADMIN — VITAMIN D, TAB 1000IU (100/BT) 1000 UNITS: 25 TAB at 08:57

## 2019-11-16 RX ADMIN — ACETAMINOPHEN 325 MG: 325 TABLET ORAL at 16:19

## 2019-11-16 RX ADMIN — AMLODIPINE BESYLATE 5 MG: 5 TABLET ORAL at 08:57

## 2019-11-16 ASSESSMENT — PAIN SCALES - GENERAL
PAINLEVEL_OUTOF10: 0
PAINLEVEL_OUTOF10: 2
PAINLEVEL_OUTOF10: 0

## 2019-11-17 PROCEDURE — 97112 NEUROMUSCULAR REEDUCATION: CPT

## 2019-11-17 PROCEDURE — 6360000002 HC RX W HCPCS: Performed by: INTERNAL MEDICINE

## 2019-11-17 PROCEDURE — 97116 GAIT TRAINING THERAPY: CPT

## 2019-11-17 PROCEDURE — 6370000000 HC RX 637 (ALT 250 FOR IP): Performed by: PHYSICAL MEDICINE & REHABILITATION

## 2019-11-17 PROCEDURE — 1180000000 HC REHAB R&B

## 2019-11-17 PROCEDURE — 6370000000 HC RX 637 (ALT 250 FOR IP): Performed by: INTERNAL MEDICINE

## 2019-11-17 RX ORDER — HYDRALAZINE HYDROCHLORIDE 25 MG/1
25 TABLET, FILM COATED ORAL
Status: ACTIVE | OUTPATIENT
Start: 2019-11-17 | End: 2019-11-17

## 2019-11-17 RX ADMIN — VITAMIN D, TAB 1000IU (100/BT) 1000 UNITS: 25 TAB at 08:35

## 2019-11-17 RX ADMIN — ENOXAPARIN SODIUM 30 MG: 30 INJECTION SUBCUTANEOUS at 08:37

## 2019-11-17 RX ADMIN — NITROFURANTOIN (MONOHYDRATE/MACROCRYSTALS) 100 MG: 75; 25 CAPSULE ORAL at 08:36

## 2019-11-17 RX ADMIN — FLUTICASONE PROPIONATE 2 SPRAY: 50 SPRAY, METERED NASAL at 08:35

## 2019-11-17 RX ADMIN — ATORVASTATIN CALCIUM 20 MG: 20 TABLET, FILM COATED ORAL at 21:52

## 2019-11-17 RX ADMIN — AMLODIPINE BESYLATE 5 MG: 5 TABLET ORAL at 08:36

## 2019-11-17 RX ADMIN — ASPIRIN 81 MG: 81 TABLET, COATED ORAL at 08:36

## 2019-11-17 RX ADMIN — OXYCODONE HYDROCHLORIDE AND ACETAMINOPHEN 500 MG: 500 TABLET ORAL at 08:41

## 2019-11-17 RX ADMIN — Medication 100 MG: at 08:36

## 2019-11-17 RX ADMIN — ANTACID TABLETS 500 MG: 500 TABLET, CHEWABLE ORAL at 17:17

## 2019-11-17 RX ADMIN — AMLODIPINE BESYLATE 5 MG: 5 TABLET ORAL at 21:52

## 2019-11-17 ASSESSMENT — PAIN SCALES - GENERAL: PAINLEVEL_OUTOF10: 0

## 2019-11-18 PROCEDURE — 97112 NEUROMUSCULAR REEDUCATION: CPT

## 2019-11-18 PROCEDURE — 1180000000 HC REHAB R&B

## 2019-11-18 PROCEDURE — 97535 SELF CARE MNGMENT TRAINING: CPT

## 2019-11-18 PROCEDURE — 6370000000 HC RX 637 (ALT 250 FOR IP): Performed by: INTERNAL MEDICINE

## 2019-11-18 PROCEDURE — 97110 THERAPEUTIC EXERCISES: CPT

## 2019-11-18 PROCEDURE — 97530 THERAPEUTIC ACTIVITIES: CPT

## 2019-11-18 PROCEDURE — 97116 GAIT TRAINING THERAPY: CPT

## 2019-11-18 PROCEDURE — 6370000000 HC RX 637 (ALT 250 FOR IP): Performed by: PHYSICAL MEDICINE & REHABILITATION

## 2019-11-18 PROCEDURE — 6360000002 HC RX W HCPCS: Performed by: INTERNAL MEDICINE

## 2019-11-18 PROCEDURE — 99233 SBSQ HOSP IP/OBS HIGH 50: CPT | Performed by: PHYSICAL MEDICINE & REHABILITATION

## 2019-11-18 RX ADMIN — OXYCODONE HYDROCHLORIDE AND ACETAMINOPHEN 500 MG: 500 TABLET ORAL at 08:57

## 2019-11-18 RX ADMIN — VITAMIN D, TAB 1000IU (100/BT) 1000 UNITS: 25 TAB at 08:57

## 2019-11-18 RX ADMIN — Medication 100 MG: at 08:57

## 2019-11-18 RX ADMIN — FLUTICASONE PROPIONATE 2 SPRAY: 50 SPRAY, METERED NASAL at 08:58

## 2019-11-18 RX ADMIN — ASPIRIN 81 MG: 81 TABLET, COATED ORAL at 08:57

## 2019-11-18 RX ADMIN — ATORVASTATIN CALCIUM 20 MG: 20 TABLET, FILM COATED ORAL at 20:55

## 2019-11-18 RX ADMIN — AMLODIPINE BESYLATE 5 MG: 5 TABLET ORAL at 20:55

## 2019-11-18 RX ADMIN — ENOXAPARIN SODIUM 30 MG: 30 INJECTION SUBCUTANEOUS at 08:58

## 2019-11-18 RX ADMIN — AMLODIPINE BESYLATE 5 MG: 5 TABLET ORAL at 08:57

## 2019-11-18 RX ADMIN — NITROFURANTOIN (MONOHYDRATE/MACROCRYSTALS) 100 MG: 75; 25 CAPSULE ORAL at 08:57

## 2019-11-18 ASSESSMENT — PAIN SCALES - GENERAL: PAINLEVEL_OUTOF10: 0

## 2019-11-19 PROCEDURE — 6360000002 HC RX W HCPCS: Performed by: INTERNAL MEDICINE

## 2019-11-19 PROCEDURE — 97116 GAIT TRAINING THERAPY: CPT

## 2019-11-19 PROCEDURE — 6370000000 HC RX 637 (ALT 250 FOR IP): Performed by: PHYSICAL MEDICINE & REHABILITATION

## 2019-11-19 PROCEDURE — 6370000000 HC RX 637 (ALT 250 FOR IP): Performed by: INTERNAL MEDICINE

## 2019-11-19 PROCEDURE — 97110 THERAPEUTIC EXERCISES: CPT

## 2019-11-19 PROCEDURE — 97112 NEUROMUSCULAR REEDUCATION: CPT

## 2019-11-19 PROCEDURE — 1180000000 HC REHAB R&B

## 2019-11-19 PROCEDURE — 99232 SBSQ HOSP IP/OBS MODERATE 35: CPT | Performed by: PHYSICAL MEDICINE & REHABILITATION

## 2019-11-19 PROCEDURE — 92507 TX SP LANG VOICE COMM INDIV: CPT

## 2019-11-19 PROCEDURE — 97127 HC OT THER IVNTJ W/FOCUS COG FUNCJ: CPT

## 2019-11-19 PROCEDURE — 97530 THERAPEUTIC ACTIVITIES: CPT

## 2019-11-19 RX ORDER — ANALGESIC BALM 1.74; 4.06 G/29G; G/29G
OINTMENT TOPICAL 4 TIMES DAILY PRN
Status: DISCONTINUED | OUTPATIENT
Start: 2019-11-19 | End: 2019-11-24 | Stop reason: HOSPADM

## 2019-11-19 RX ADMIN — AMLODIPINE BESYLATE 5 MG: 5 TABLET ORAL at 08:43

## 2019-11-19 RX ADMIN — OXYCODONE HYDROCHLORIDE AND ACETAMINOPHEN 500 MG: 500 TABLET ORAL at 08:43

## 2019-11-19 RX ADMIN — VITAMIN D, TAB 1000IU (100/BT) 1000 UNITS: 25 TAB at 08:43

## 2019-11-19 RX ADMIN — ENOXAPARIN SODIUM 30 MG: 30 INJECTION SUBCUTANEOUS at 08:44

## 2019-11-19 RX ADMIN — ASPIRIN 81 MG: 81 TABLET, COATED ORAL at 08:44

## 2019-11-19 RX ADMIN — ATORVASTATIN CALCIUM 20 MG: 20 TABLET, FILM COATED ORAL at 20:10

## 2019-11-19 RX ADMIN — NITROFURANTOIN (MONOHYDRATE/MACROCRYSTALS) 100 MG: 75; 25 CAPSULE ORAL at 08:43

## 2019-11-19 RX ADMIN — Medication 100 MG: at 08:43

## 2019-11-19 RX ADMIN — AMLODIPINE BESYLATE 5 MG: 5 TABLET ORAL at 20:10

## 2019-11-19 ASSESSMENT — PAIN SCALES - GENERAL
PAINLEVEL_OUTOF10: 0

## 2019-11-20 PROCEDURE — 97535 SELF CARE MNGMENT TRAINING: CPT

## 2019-11-20 PROCEDURE — 6360000002 HC RX W HCPCS: Performed by: INTERNAL MEDICINE

## 2019-11-20 PROCEDURE — 1180000000 HC REHAB R&B

## 2019-11-20 PROCEDURE — 99232 SBSQ HOSP IP/OBS MODERATE 35: CPT | Performed by: PHYSICAL MEDICINE & REHABILITATION

## 2019-11-20 PROCEDURE — 92507 TX SP LANG VOICE COMM INDIV: CPT

## 2019-11-20 PROCEDURE — 6370000000 HC RX 637 (ALT 250 FOR IP): Performed by: INTERNAL MEDICINE

## 2019-11-20 PROCEDURE — 6370000000 HC RX 637 (ALT 250 FOR IP): Performed by: PHYSICAL MEDICINE & REHABILITATION

## 2019-11-20 PROCEDURE — 97112 NEUROMUSCULAR REEDUCATION: CPT

## 2019-11-20 PROCEDURE — 97116 GAIT TRAINING THERAPY: CPT

## 2019-11-20 RX ADMIN — ENOXAPARIN SODIUM 30 MG: 30 INJECTION SUBCUTANEOUS at 08:00

## 2019-11-20 RX ADMIN — VITAMIN D, TAB 1000IU (100/BT) 1000 UNITS: 25 TAB at 08:00

## 2019-11-20 RX ADMIN — NITROFURANTOIN (MONOHYDRATE/MACROCRYSTALS) 100 MG: 75; 25 CAPSULE ORAL at 07:59

## 2019-11-20 RX ADMIN — ATORVASTATIN CALCIUM 20 MG: 20 TABLET, FILM COATED ORAL at 20:28

## 2019-11-20 RX ADMIN — ASPIRIN 81 MG: 81 TABLET, COATED ORAL at 08:00

## 2019-11-20 RX ADMIN — AMLODIPINE BESYLATE 5 MG: 5 TABLET ORAL at 20:28

## 2019-11-20 RX ADMIN — AMLODIPINE BESYLATE 5 MG: 5 TABLET ORAL at 08:00

## 2019-11-20 RX ADMIN — FLUTICASONE PROPIONATE 2 SPRAY: 50 SPRAY, METERED NASAL at 08:00

## 2019-11-20 RX ADMIN — Medication 100 MG: at 08:00

## 2019-11-20 RX ADMIN — OXYCODONE HYDROCHLORIDE AND ACETAMINOPHEN 500 MG: 500 TABLET ORAL at 08:00

## 2019-11-20 ASSESSMENT — PAIN SCALES - GENERAL
PAINLEVEL_OUTOF10: 0

## 2019-11-21 PROCEDURE — 6370000000 HC RX 637 (ALT 250 FOR IP): Performed by: PHYSICAL MEDICINE & REHABILITATION

## 2019-11-21 PROCEDURE — 97112 NEUROMUSCULAR REEDUCATION: CPT

## 2019-11-21 PROCEDURE — 97116 GAIT TRAINING THERAPY: CPT

## 2019-11-21 PROCEDURE — 99232 SBSQ HOSP IP/OBS MODERATE 35: CPT | Performed by: PHYSICAL MEDICINE & REHABILITATION

## 2019-11-21 PROCEDURE — 6370000000 HC RX 637 (ALT 250 FOR IP): Performed by: INTERNAL MEDICINE

## 2019-11-21 PROCEDURE — 97127 HC OT THER IVNTJ W/FOCUS COG FUNCJ: CPT

## 2019-11-21 PROCEDURE — 6360000002 HC RX W HCPCS: Performed by: INTERNAL MEDICINE

## 2019-11-21 PROCEDURE — 97110 THERAPEUTIC EXERCISES: CPT

## 2019-11-21 PROCEDURE — 1180000000 HC REHAB R&B

## 2019-11-21 PROCEDURE — 92507 TX SP LANG VOICE COMM INDIV: CPT

## 2019-11-21 PROCEDURE — 97535 SELF CARE MNGMENT TRAINING: CPT

## 2019-11-21 PROCEDURE — 97127 HC SP THER IVNTJ W/FOCUS COG FUNCJ: CPT

## 2019-11-21 RX ADMIN — AMLODIPINE BESYLATE 5 MG: 5 TABLET ORAL at 07:44

## 2019-11-21 RX ADMIN — OXYCODONE HYDROCHLORIDE AND ACETAMINOPHEN 500 MG: 500 TABLET ORAL at 07:42

## 2019-11-21 RX ADMIN — ANTACID TABLETS 500 MG: 500 TABLET, CHEWABLE ORAL at 22:35

## 2019-11-21 RX ADMIN — VITAMIN D, TAB 1000IU (100/BT) 1000 UNITS: 25 TAB at 07:43

## 2019-11-21 RX ADMIN — ASPIRIN 81 MG: 81 TABLET, COATED ORAL at 07:43

## 2019-11-21 RX ADMIN — FLUTICASONE PROPIONATE 2 SPRAY: 50 SPRAY, METERED NASAL at 07:43

## 2019-11-21 RX ADMIN — Medication 100 MG: at 07:42

## 2019-11-21 RX ADMIN — ENOXAPARIN SODIUM 30 MG: 30 INJECTION SUBCUTANEOUS at 07:43

## 2019-11-21 RX ADMIN — NITROFURANTOIN (MONOHYDRATE/MACROCRYSTALS) 100 MG: 75; 25 CAPSULE ORAL at 07:43

## 2019-11-21 RX ADMIN — AMLODIPINE BESYLATE 5 MG: 5 TABLET ORAL at 22:34

## 2019-11-21 RX ADMIN — ATORVASTATIN CALCIUM 20 MG: 20 TABLET, FILM COATED ORAL at 22:35

## 2019-11-21 ASSESSMENT — PAIN SCALES - GENERAL
PAINLEVEL_OUTOF10: 0

## 2019-11-22 PROCEDURE — 6370000000 HC RX 637 (ALT 250 FOR IP): Performed by: PHYSICAL MEDICINE & REHABILITATION

## 2019-11-22 PROCEDURE — 1180000000 HC REHAB R&B

## 2019-11-22 PROCEDURE — 97116 GAIT TRAINING THERAPY: CPT

## 2019-11-22 PROCEDURE — 99231 SBSQ HOSP IP/OBS SF/LOW 25: CPT | Performed by: PHYSICAL MEDICINE & REHABILITATION

## 2019-11-22 PROCEDURE — 97535 SELF CARE MNGMENT TRAINING: CPT

## 2019-11-22 PROCEDURE — 6370000000 HC RX 637 (ALT 250 FOR IP): Performed by: INTERNAL MEDICINE

## 2019-11-22 PROCEDURE — 97112 NEUROMUSCULAR REEDUCATION: CPT

## 2019-11-22 PROCEDURE — 97530 THERAPEUTIC ACTIVITIES: CPT

## 2019-11-22 PROCEDURE — 94640 AIRWAY INHALATION TREATMENT: CPT

## 2019-11-22 RX ORDER — IPRATROPIUM BROMIDE AND ALBUTEROL SULFATE 2.5; .5 MG/3ML; MG/3ML
1 SOLUTION RESPIRATORY (INHALATION) ONCE
Status: COMPLETED | OUTPATIENT
Start: 2019-11-22 | End: 2019-11-22

## 2019-11-22 RX ORDER — ALBUTEROL SULFATE 90 UG/1
2 AEROSOL, METERED RESPIRATORY (INHALATION)
Status: DISCONTINUED | OUTPATIENT
Start: 2019-11-23 | End: 2019-11-24 | Stop reason: HOSPADM

## 2019-11-22 RX ORDER — ALBUTEROL SULFATE 2.5 MG/3ML
2.5 SOLUTION RESPIRATORY (INHALATION) EVERY 6 HOURS PRN
Status: DISCONTINUED | OUTPATIENT
Start: 2019-11-22 | End: 2019-11-24 | Stop reason: HOSPADM

## 2019-11-22 RX ADMIN — ANTACID TABLETS 500 MG: 500 TABLET, CHEWABLE ORAL at 22:29

## 2019-11-22 RX ADMIN — ASPIRIN 81 MG: 81 TABLET, COATED ORAL at 09:41

## 2019-11-22 RX ADMIN — AMLODIPINE BESYLATE 5 MG: 5 TABLET ORAL at 09:39

## 2019-11-22 RX ADMIN — IPRATROPIUM BROMIDE AND ALBUTEROL SULFATE 1 AMPULE: .5; 3 SOLUTION RESPIRATORY (INHALATION) at 15:12

## 2019-11-22 RX ADMIN — ATORVASTATIN CALCIUM 20 MG: 20 TABLET, FILM COATED ORAL at 22:28

## 2019-11-22 RX ADMIN — AMLODIPINE BESYLATE 5 MG: 5 TABLET ORAL at 22:28

## 2019-11-22 RX ADMIN — OXYCODONE HYDROCHLORIDE AND ACETAMINOPHEN 500 MG: 500 TABLET ORAL at 09:39

## 2019-11-22 RX ADMIN — VITAMIN D, TAB 1000IU (100/BT) 1000 UNITS: 25 TAB at 09:39

## 2019-11-22 RX ADMIN — Medication 100 MG: at 09:39

## 2019-11-22 ASSESSMENT — PAIN SCALES - GENERAL
PAINLEVEL_OUTOF10: 0
PAINLEVEL_OUTOF10: 0

## 2019-11-23 PROCEDURE — 97112 NEUROMUSCULAR REEDUCATION: CPT

## 2019-11-23 PROCEDURE — 94761 N-INVAS EAR/PLS OXIMETRY MLT: CPT

## 2019-11-23 PROCEDURE — 97116 GAIT TRAINING THERAPY: CPT

## 2019-11-23 PROCEDURE — 1180000000 HC REHAB R&B

## 2019-11-23 PROCEDURE — 94640 AIRWAY INHALATION TREATMENT: CPT

## 2019-11-23 PROCEDURE — 6370000000 HC RX 637 (ALT 250 FOR IP): Performed by: PHYSICAL MEDICINE & REHABILITATION

## 2019-11-23 PROCEDURE — 97530 THERAPEUTIC ACTIVITIES: CPT

## 2019-11-23 PROCEDURE — 6370000000 HC RX 637 (ALT 250 FOR IP): Performed by: INTERNAL MEDICINE

## 2019-11-23 PROCEDURE — 97127 HC OT THER IVNTJ W/FOCUS COG FUNCJ: CPT

## 2019-11-23 PROCEDURE — 99232 SBSQ HOSP IP/OBS MODERATE 35: CPT | Performed by: PHYSICAL MEDICINE & REHABILITATION

## 2019-11-23 RX ADMIN — AMLODIPINE BESYLATE 5 MG: 5 TABLET ORAL at 08:35

## 2019-11-23 RX ADMIN — ALBUTEROL SULFATE 2 PUFF: 90 AEROSOL, METERED RESPIRATORY (INHALATION) at 04:54

## 2019-11-23 RX ADMIN — MENTHOL AND METHYL SALICYLATE: 7.6; 29 OINTMENT TOPICAL at 08:35

## 2019-11-23 RX ADMIN — OXYCODONE HYDROCHLORIDE AND ACETAMINOPHEN 500 MG: 500 TABLET ORAL at 12:26

## 2019-11-23 RX ADMIN — ATORVASTATIN CALCIUM 20 MG: 20 TABLET, FILM COATED ORAL at 20:09

## 2019-11-23 RX ADMIN — Medication 100 MG: at 08:35

## 2019-11-23 RX ADMIN — ANTACID TABLETS 500 MG: 500 TABLET, CHEWABLE ORAL at 08:35

## 2019-11-23 RX ADMIN — AMLODIPINE BESYLATE 5 MG: 5 TABLET ORAL at 20:09

## 2019-11-23 RX ADMIN — VITAMIN D, TAB 1000IU (100/BT) 1000 UNITS: 25 TAB at 08:35

## 2019-11-23 RX ADMIN — ASPIRIN 81 MG: 81 TABLET, COATED ORAL at 08:35

## 2019-11-23 ASSESSMENT — PAIN SCALES - GENERAL
PAINLEVEL_OUTOF10: 0

## 2019-11-24 VITALS
BODY MASS INDEX: 25.89 KG/M2 | WEIGHT: 161.06 LBS | DIASTOLIC BLOOD PRESSURE: 75 MMHG | SYSTOLIC BLOOD PRESSURE: 147 MMHG | OXYGEN SATURATION: 93 % | HEART RATE: 70 BPM | HEIGHT: 66 IN | RESPIRATION RATE: 17 BRPM | TEMPERATURE: 97 F

## 2019-11-24 PROCEDURE — 94640 AIRWAY INHALATION TREATMENT: CPT

## 2019-11-24 PROCEDURE — 6370000000 HC RX 637 (ALT 250 FOR IP): Performed by: INTERNAL MEDICINE

## 2019-11-24 PROCEDURE — 94761 N-INVAS EAR/PLS OXIMETRY MLT: CPT

## 2019-11-24 PROCEDURE — 99238 HOSP IP/OBS DSCHRG MGMT 30/<: CPT | Performed by: PHYSICAL MEDICINE & REHABILITATION

## 2019-11-24 RX ORDER — ALBUTEROL SULFATE 90 UG/1
2 AEROSOL, METERED RESPIRATORY (INHALATION)
Qty: 1 INHALER | Refills: 3 | Status: SHIPPED | OUTPATIENT
Start: 2019-11-25 | End: 2021-01-01 | Stop reason: SDUPTHER

## 2019-11-24 RX ORDER — ATORVASTATIN CALCIUM 20 MG/1
20 TABLET, FILM COATED ORAL DAILY
Qty: 30 TABLET | Refills: 3 | Status: SHIPPED | OUTPATIENT
Start: 2019-11-24 | End: 2020-02-05 | Stop reason: SDUPTHER

## 2019-11-24 RX ADMIN — Medication 100 MG: at 07:34

## 2019-11-24 RX ADMIN — ASPIRIN 81 MG: 81 TABLET, COATED ORAL at 07:34

## 2019-11-24 RX ADMIN — OXYCODONE HYDROCHLORIDE AND ACETAMINOPHEN 500 MG: 500 TABLET ORAL at 07:33

## 2019-11-24 RX ADMIN — ALBUTEROL SULFATE 2 PUFF: 90 AEROSOL, METERED RESPIRATORY (INHALATION) at 04:25

## 2019-11-24 RX ADMIN — ANTACID TABLETS 500 MG: 500 TABLET, CHEWABLE ORAL at 07:33

## 2019-11-24 RX ADMIN — VITAMIN D, TAB 1000IU (100/BT) 1000 UNITS: 25 TAB at 07:34

## 2019-11-24 RX ADMIN — AMLODIPINE BESYLATE 5 MG: 5 TABLET ORAL at 07:34

## 2019-11-24 ASSESSMENT — PAIN SCALES - GENERAL: PAINLEVEL_OUTOF10: 0

## 2019-11-25 ENCOUNTER — CARE COORDINATION (OUTPATIENT)
Dept: CASE MANAGEMENT | Age: 79
End: 2019-11-25

## 2019-11-26 ENCOUNTER — OFFICE VISIT (OUTPATIENT)
Dept: FAMILY MEDICINE CLINIC | Age: 79
End: 2019-11-26
Payer: MEDICARE

## 2019-11-26 ENCOUNTER — CARE COORDINATION (OUTPATIENT)
Dept: CASE MANAGEMENT | Age: 79
End: 2019-11-26

## 2019-11-26 VITALS
OXYGEN SATURATION: 99 % | HEART RATE: 82 BPM | DIASTOLIC BLOOD PRESSURE: 82 MMHG | WEIGHT: 162 LBS | RESPIRATION RATE: 18 BRPM | HEIGHT: 61 IN | SYSTOLIC BLOOD PRESSURE: 134 MMHG | TEMPERATURE: 97.8 F | BODY MASS INDEX: 30.58 KG/M2

## 2019-11-26 DIAGNOSIS — I63.9 RIGHT-SIDED CEREBROVASCULAR ACCIDENT (CVA) (HCC): Primary | ICD-10-CM

## 2019-11-26 DIAGNOSIS — G31.84 MILD COGNITIVE IMPAIRMENT: ICD-10-CM

## 2019-11-26 DIAGNOSIS — J41.1 MUCOPURULENT CHRONIC BRONCHITIS (HCC): ICD-10-CM

## 2019-11-26 DIAGNOSIS — I10 HTN (HYPERTENSION), BENIGN: ICD-10-CM

## 2019-11-26 PROCEDURE — G8427 DOCREV CUR MEDS BY ELIG CLIN: HCPCS | Performed by: FAMILY MEDICINE

## 2019-11-26 PROCEDURE — G8482 FLU IMMUNIZE ORDER/ADMIN: HCPCS | Performed by: FAMILY MEDICINE

## 2019-11-26 PROCEDURE — 1123F ACP DISCUSS/DSCN MKR DOCD: CPT | Performed by: FAMILY MEDICINE

## 2019-11-26 PROCEDURE — 99214 OFFICE O/P EST MOD 30 MIN: CPT | Performed by: FAMILY MEDICINE

## 2019-11-26 PROCEDURE — 3288F FALL RISK ASSESSMENT DOCD: CPT | Performed by: FAMILY MEDICINE

## 2019-11-26 PROCEDURE — 1111F DSCHRG MED/CURRENT MED MERGE: CPT | Performed by: FAMILY MEDICINE

## 2019-11-26 PROCEDURE — 1090F PRES/ABSN URINE INCON ASSESS: CPT | Performed by: FAMILY MEDICINE

## 2019-11-26 PROCEDURE — 4040F PNEUMOC VAC/ADMIN/RCVD: CPT | Performed by: FAMILY MEDICINE

## 2019-11-26 PROCEDURE — G8417 CALC BMI ABV UP PARAM F/U: HCPCS | Performed by: FAMILY MEDICINE

## 2019-11-26 PROCEDURE — 1036F TOBACCO NON-USER: CPT | Performed by: FAMILY MEDICINE

## 2019-11-26 PROCEDURE — G8399 PT W/DXA RESULTS DOCUMENT: HCPCS | Performed by: FAMILY MEDICINE

## 2019-11-26 PROCEDURE — 3023F SPIROM DOC REV: CPT | Performed by: FAMILY MEDICINE

## 2019-11-26 PROCEDURE — G8598 ASA/ANTIPLAT THER USED: HCPCS | Performed by: FAMILY MEDICINE

## 2019-11-26 PROCEDURE — G8926 SPIRO NO PERF OR DOC: HCPCS | Performed by: FAMILY MEDICINE

## 2019-12-03 ENCOUNTER — HOSPITAL ENCOUNTER (OUTPATIENT)
Dept: SPEECH THERAPY | Age: 79
Setting detail: THERAPIES SERIES
Discharge: HOME | End: 2019-12-03
Payer: MEDICARE

## 2019-12-03 ENCOUNTER — HOSPITAL ENCOUNTER (OUTPATIENT)
Dept: OCCUPATIONAL THERAPY | Age: 79
Setting detail: THERAPIES SERIES
Discharge: HOME | End: 2019-12-03
Payer: MEDICARE

## 2019-12-03 ENCOUNTER — HOSPITAL ENCOUNTER (OUTPATIENT)
Dept: PHYSICAL THERAPY | Age: 79
Setting detail: THERAPIES SERIES
Discharge: HOME | End: 2019-12-03
Payer: MEDICARE

## 2019-12-03 PROCEDURE — 92523 SPEECH SOUND LANG COMPREHEN: CPT

## 2019-12-03 PROCEDURE — 97162 PT EVAL MOD COMPLEX 30 MIN: CPT

## 2019-12-03 PROCEDURE — 97165 OT EVAL LOW COMPLEX 30 MIN: CPT

## 2019-12-03 PROCEDURE — 97110 THERAPEUTIC EXERCISES: CPT

## 2019-12-03 PROCEDURE — 97166 OT EVAL MOD COMPLEX 45 MIN: CPT

## 2019-12-03 ASSESSMENT — PAIN DESCRIPTION - LOCATION: LOCATION: HIP

## 2019-12-03 ASSESSMENT — PAIN DESCRIPTION - ORIENTATION: ORIENTATION: RIGHT;LEFT

## 2019-12-03 ASSESSMENT — PAIN SCALES - GENERAL: PAINLEVEL_OUTOF10: 8

## 2019-12-04 ENCOUNTER — TELEPHONE (OUTPATIENT)
Dept: FAMILY MEDICINE CLINIC | Age: 79
End: 2019-12-04

## 2019-12-04 NOTE — TELEPHONE ENCOUNTER
The inhaler was not prescribed by me. It should be used every 6 hours as needed for cough and wheezing. I recommend taking the prescriptions including atorvastatin and amlodipine at nighttime and the vitamins in the morning. Regarding her anxiety, I understand the concern. Home therapy is not going to be helpful.  It

## 2019-12-05 ENCOUNTER — HOSPITAL ENCOUNTER (OUTPATIENT)
Dept: SPEECH THERAPY | Age: 79
Setting detail: THERAPIES SERIES
Discharge: HOME | End: 2019-12-05
Payer: MEDICARE

## 2019-12-05 PROCEDURE — 97127 HC SP THER IVNTJ W/FOCUS COG FUNCJ: CPT

## 2019-12-10 ENCOUNTER — HOSPITAL ENCOUNTER (OUTPATIENT)
Dept: PHYSICAL THERAPY | Age: 79
Setting detail: THERAPIES SERIES
Discharge: HOME | End: 2019-12-10
Payer: MEDICARE

## 2019-12-10 ENCOUNTER — HOSPITAL ENCOUNTER (OUTPATIENT)
Dept: SPEECH THERAPY | Age: 79
Setting detail: THERAPIES SERIES
Discharge: HOME | End: 2019-12-10
Payer: MEDICARE

## 2019-12-10 PROCEDURE — 97110 THERAPEUTIC EXERCISES: CPT

## 2019-12-10 PROCEDURE — 97127 HC SP THER IVNTJ W/FOCUS COG FUNCJ: CPT

## 2019-12-10 PROCEDURE — 97112 NEUROMUSCULAR REEDUCATION: CPT

## 2019-12-12 ENCOUNTER — HOSPITAL ENCOUNTER (OUTPATIENT)
Dept: PHYSICAL THERAPY | Age: 79
Setting detail: THERAPIES SERIES
Discharge: HOME | End: 2019-12-12
Payer: MEDICARE

## 2019-12-12 ENCOUNTER — HOSPITAL ENCOUNTER (OUTPATIENT)
Dept: SPEECH THERAPY | Age: 79
Setting detail: THERAPIES SERIES
Discharge: HOME | End: 2019-12-12
Payer: MEDICARE

## 2019-12-12 ENCOUNTER — HOSPITAL ENCOUNTER (OUTPATIENT)
Dept: OCCUPATIONAL THERAPY | Age: 79
Setting detail: THERAPIES SERIES
Discharge: HOME | End: 2019-12-12
Payer: MEDICARE

## 2019-12-12 PROCEDURE — 97127 HC SP THER IVNTJ W/FOCUS COG FUNCJ: CPT

## 2019-12-12 PROCEDURE — 97110 THERAPEUTIC EXERCISES: CPT

## 2019-12-12 PROCEDURE — 97530 THERAPEUTIC ACTIVITIES: CPT

## 2019-12-12 PROCEDURE — 97112 NEUROMUSCULAR REEDUCATION: CPT

## 2019-12-12 ASSESSMENT — PAIN DESCRIPTION - FREQUENCY: FREQUENCY: INTERMITTENT

## 2019-12-12 ASSESSMENT — PAIN SCALES - GENERAL: PAINLEVEL_OUTOF10: 6

## 2019-12-12 ASSESSMENT — PAIN DESCRIPTION - LOCATION: LOCATION: HIP

## 2019-12-12 ASSESSMENT — PAIN DESCRIPTION - ORIENTATION: ORIENTATION: LEFT

## 2019-12-17 ENCOUNTER — HOSPITAL ENCOUNTER (OUTPATIENT)
Dept: SPEECH THERAPY | Age: 79
Setting detail: THERAPIES SERIES
Discharge: HOME | End: 2019-12-17
Payer: MEDICARE

## 2019-12-17 ENCOUNTER — HOSPITAL ENCOUNTER (OUTPATIENT)
Dept: PHYSICAL THERAPY | Age: 79
Setting detail: THERAPIES SERIES
Discharge: HOME | End: 2019-12-17
Payer: MEDICARE

## 2019-12-17 ENCOUNTER — HOSPITAL ENCOUNTER (OUTPATIENT)
Dept: OCCUPATIONAL THERAPY | Age: 79
Setting detail: THERAPIES SERIES
Discharge: HOME | End: 2019-12-17
Payer: MEDICARE

## 2019-12-17 PROCEDURE — 97127 HC SP THER IVNTJ W/FOCUS COG FUNCJ: CPT

## 2019-12-17 PROCEDURE — 97110 THERAPEUTIC EXERCISES: CPT

## 2019-12-17 PROCEDURE — 97530 THERAPEUTIC ACTIVITIES: CPT

## 2019-12-17 PROCEDURE — 97112 NEUROMUSCULAR REEDUCATION: CPT

## 2019-12-19 ENCOUNTER — HOSPITAL ENCOUNTER (OUTPATIENT)
Dept: PHYSICAL THERAPY | Age: 79
Setting detail: THERAPIES SERIES
Discharge: HOME | End: 2019-12-19
Payer: MEDICARE

## 2019-12-19 ENCOUNTER — HOSPITAL ENCOUNTER (OUTPATIENT)
Dept: SPEECH THERAPY | Age: 79
Setting detail: THERAPIES SERIES
Discharge: HOME | End: 2019-12-19
Payer: MEDICARE

## 2019-12-24 ENCOUNTER — OFFICE VISIT (OUTPATIENT)
Dept: FAMILY MEDICINE CLINIC | Age: 79
End: 2019-12-24
Payer: MEDICARE

## 2019-12-24 ENCOUNTER — HOSPITAL ENCOUNTER (OUTPATIENT)
Dept: GENERAL RADIOLOGY | Age: 79
Discharge: HOME | End: 2019-12-26
Payer: MEDICARE

## 2019-12-24 ENCOUNTER — HOSPITAL ENCOUNTER (OUTPATIENT)
Dept: PHYSICAL THERAPY | Age: 79
Setting detail: THERAPIES SERIES
Discharge: HOME | End: 2019-12-24
Payer: MEDICARE

## 2019-12-24 ENCOUNTER — HOSPITAL ENCOUNTER (OUTPATIENT)
Dept: SPEECH THERAPY | Age: 79
Setting detail: THERAPIES SERIES
Discharge: HOME | End: 2019-12-24
Payer: MEDICARE

## 2019-12-24 VITALS
RESPIRATION RATE: 14 BRPM | DIASTOLIC BLOOD PRESSURE: 86 MMHG | WEIGHT: 156.2 LBS | TEMPERATURE: 97 F | OXYGEN SATURATION: 95 % | HEART RATE: 86 BPM | SYSTOLIC BLOOD PRESSURE: 112 MMHG | HEIGHT: 61 IN | BODY MASS INDEX: 29.49 KG/M2

## 2019-12-24 VITALS
OXYGEN SATURATION: 95 % | RESPIRATION RATE: 14 BRPM | SYSTOLIC BLOOD PRESSURE: 112 MMHG | BODY MASS INDEX: 29.49 KG/M2 | DIASTOLIC BLOOD PRESSURE: 86 MMHG | TEMPERATURE: 97 F | HEIGHT: 61 IN | WEIGHT: 156.2 LBS | HEART RATE: 86 BPM

## 2019-12-24 DIAGNOSIS — M19.90 OSTEOARTHRITIS, UNSPECIFIED OSTEOARTHRITIS TYPE, UNSPECIFIED SITE: ICD-10-CM

## 2019-12-24 DIAGNOSIS — M25.552 BILATERAL HIP PAIN: ICD-10-CM

## 2019-12-24 DIAGNOSIS — M25.551 BILATERAL HIP PAIN: ICD-10-CM

## 2019-12-24 DIAGNOSIS — I10 HTN (HYPERTENSION), BENIGN: ICD-10-CM

## 2019-12-24 DIAGNOSIS — I63.9 RIGHT-SIDED CEREBROVASCULAR ACCIDENT (CVA) (HCC): Primary | ICD-10-CM

## 2019-12-24 DIAGNOSIS — Z00.00 ROUTINE GENERAL MEDICAL EXAMINATION AT A HEALTH CARE FACILITY: Primary | ICD-10-CM

## 2019-12-24 DIAGNOSIS — J41.1 MUCOPURULENT CHRONIC BRONCHITIS (HCC): ICD-10-CM

## 2019-12-24 PROCEDURE — G8926 SPIRO NO PERF OR DOC: HCPCS | Performed by: FAMILY MEDICINE

## 2019-12-24 PROCEDURE — 1036F TOBACCO NON-USER: CPT | Performed by: FAMILY MEDICINE

## 2019-12-24 PROCEDURE — G0438 PPPS, INITIAL VISIT: HCPCS | Performed by: FAMILY MEDICINE

## 2019-12-24 PROCEDURE — G8482 FLU IMMUNIZE ORDER/ADMIN: HCPCS | Performed by: FAMILY MEDICINE

## 2019-12-24 PROCEDURE — 97127 HC SP THER IVNTJ W/FOCUS COG FUNCJ: CPT

## 2019-12-24 PROCEDURE — 97110 THERAPEUTIC EXERCISES: CPT

## 2019-12-24 PROCEDURE — 1123F ACP DISCUSS/DSCN MKR DOCD: CPT | Performed by: FAMILY MEDICINE

## 2019-12-24 PROCEDURE — 1090F PRES/ABSN URINE INCON ASSESS: CPT | Performed by: FAMILY MEDICINE

## 2019-12-24 PROCEDURE — 3023F SPIROM DOC REV: CPT | Performed by: FAMILY MEDICINE

## 2019-12-24 PROCEDURE — 4040F PNEUMOC VAC/ADMIN/RCVD: CPT | Performed by: FAMILY MEDICINE

## 2019-12-24 PROCEDURE — 99214 OFFICE O/P EST MOD 30 MIN: CPT | Performed by: FAMILY MEDICINE

## 2019-12-24 PROCEDURE — G8417 CALC BMI ABV UP PARAM F/U: HCPCS | Performed by: FAMILY MEDICINE

## 2019-12-24 PROCEDURE — G8598 ASA/ANTIPLAT THER USED: HCPCS | Performed by: FAMILY MEDICINE

## 2019-12-24 PROCEDURE — G8427 DOCREV CUR MEDS BY ELIG CLIN: HCPCS | Performed by: FAMILY MEDICINE

## 2019-12-24 PROCEDURE — G8399 PT W/DXA RESULTS DOCUMENT: HCPCS | Performed by: FAMILY MEDICINE

## 2019-12-24 PROCEDURE — 73521 X-RAY EXAM HIPS BI 2 VIEWS: CPT

## 2019-12-24 PROCEDURE — 97112 NEUROMUSCULAR REEDUCATION: CPT

## 2019-12-24 PROCEDURE — 1111F DSCHRG MED/CURRENT MED MERGE: CPT | Performed by: FAMILY MEDICINE

## 2019-12-24 ASSESSMENT — PATIENT HEALTH QUESTIONNAIRE - PHQ9
SUM OF ALL RESPONSES TO PHQ QUESTIONS 1-9: 0
SUM OF ALL RESPONSES TO PHQ QUESTIONS 1-9: 0

## 2019-12-24 ASSESSMENT — LIFESTYLE VARIABLES
HOW OFTEN DO YOU HAVE A DRINK CONTAINING ALCOHOL: 1
HOW MANY STANDARD DRINKS CONTAINING ALCOHOL DO YOU HAVE ON A TYPICAL DAY: 0
HOW OFTEN DO YOU HAVE SIX OR MORE DRINKS ON ONE OCCASION: 0
AUDIT-C TOTAL SCORE: 1

## 2019-12-26 ENCOUNTER — HOSPITAL ENCOUNTER (OUTPATIENT)
Dept: PULMONOLOGY | Age: 79
Discharge: HOME | End: 2019-12-26
Payer: MEDICARE

## 2019-12-26 ENCOUNTER — HOSPITAL ENCOUNTER (OUTPATIENT)
Dept: SPEECH THERAPY | Age: 79
Setting detail: THERAPIES SERIES
Discharge: HOME | End: 2019-12-26
Payer: MEDICARE

## 2019-12-26 ENCOUNTER — HOSPITAL ENCOUNTER (OUTPATIENT)
Dept: OCCUPATIONAL THERAPY | Age: 79
Setting detail: THERAPIES SERIES
Discharge: HOME | End: 2019-12-26
Payer: MEDICARE

## 2019-12-26 ENCOUNTER — HOSPITAL ENCOUNTER (OUTPATIENT)
Dept: PHYSICAL THERAPY | Age: 79
Setting detail: THERAPIES SERIES
Discharge: HOME | End: 2019-12-26
Payer: MEDICARE

## 2019-12-26 PROCEDURE — 97127 HC SP THER IVNTJ W/FOCUS COG FUNCJ: CPT

## 2019-12-26 PROCEDURE — 6360000002 HC RX W HCPCS: Performed by: FAMILY MEDICINE

## 2019-12-26 PROCEDURE — 97110 THERAPEUTIC EXERCISES: CPT

## 2019-12-26 PROCEDURE — 94060 EVALUATION OF WHEEZING: CPT

## 2019-12-26 PROCEDURE — 94060 EVALUATION OF WHEEZING: CPT | Performed by: INTERNAL MEDICINE

## 2019-12-26 PROCEDURE — 97530 THERAPEUTIC ACTIVITIES: CPT

## 2019-12-26 PROCEDURE — 97116 GAIT TRAINING THERAPY: CPT

## 2019-12-26 RX ORDER — ALBUTEROL SULFATE 2.5 MG/3ML
2.5 SOLUTION RESPIRATORY (INHALATION) ONCE
Status: COMPLETED | OUTPATIENT
Start: 2019-12-26 | End: 2019-12-26

## 2019-12-26 RX ADMIN — ALBUTEROL SULFATE 2.5 MG: 2.5 SOLUTION RESPIRATORY (INHALATION) at 12:06

## 2019-12-27 ENCOUNTER — TELEPHONE (OUTPATIENT)
Dept: FAMILY MEDICINE CLINIC | Age: 79
End: 2019-12-27

## 2019-12-29 DIAGNOSIS — M16.0 OSTEOARTHRITIS OF BOTH HIPS, UNSPECIFIED OSTEOARTHRITIS TYPE: Primary | ICD-10-CM

## 2019-12-29 DIAGNOSIS — J42 CHRONIC BRONCHITIS, UNSPECIFIED CHRONIC BRONCHITIS TYPE (HCC): Primary | ICD-10-CM

## 2019-12-30 ENCOUNTER — TELEPHONE (OUTPATIENT)
Dept: FAMILY MEDICINE CLINIC | Age: 79
End: 2019-12-30

## 2019-12-30 NOTE — TELEPHONE ENCOUNTER
Patient would like a new referral to Dr. Petrona Jain for her orthopedic referral instead of Dr. Ganga Garcia. Please advise and thanks!

## 2019-12-31 ENCOUNTER — HOSPITAL ENCOUNTER (OUTPATIENT)
Dept: PHYSICAL THERAPY | Age: 79
Setting detail: THERAPIES SERIES
Discharge: HOME | End: 2019-12-31
Payer: MEDICARE

## 2019-12-31 ENCOUNTER — HOSPITAL ENCOUNTER (OUTPATIENT)
Dept: OCCUPATIONAL THERAPY | Age: 79
Setting detail: THERAPIES SERIES
Discharge: HOME | End: 2019-12-31
Payer: MEDICARE

## 2019-12-31 DIAGNOSIS — M16.0 ARTHROPATHY OF BOTH HIPS: Chronic | ICD-10-CM

## 2019-12-31 NOTE — TELEPHONE ENCOUNTER
I do not believe Dr. Manjeet Hansen does hips. I have written a general referral so the patient can choose to whom she would like to go.

## 2019-12-31 NOTE — PROGRESS NOTES
Therapy                                      Cancellation    Date: 2019  Patient Name: Fernando Matson    : 1940  (89 y.o.)     MRN: [id number]    Account #: [de-identified]       Canceled Appointment: 2    For today's appointment patient:  [x]  Cancelled  []  Rescheduled appointment  []  No-show   []  Called pt to remind of next appointment     Reason given by patient:  [x]  Patient ill  []  Conflicting appointment  []  No transportation    []  Conflict with work  []  No reason given  []  Other:      [x] Pt has future appointments scheduled, no follow up needed  [] Pt requests to be on hold.     Reason:   If > 2 weeks please discuss with therapist.  [] Therapist to call pt for follow up     Comments:       Signature: Electronically signed by QING Loving on 19 at 8:41 AM  Electronically signed by ENRIQUETA Loving on 19 at 8:44 AM

## 2019-12-31 NOTE — TELEPHONE ENCOUNTER
First attempt to reach patient. Called patient @ [phone]  and left message on machine for patient to return call during normal business hours of 8:30 AM and 5 PM @ [phone] option 2.

## 2020-01-01 ENCOUNTER — HOSPITAL ENCOUNTER (OUTPATIENT)
Dept: ULTRASOUND IMAGING | Age: 80
Discharge: HOME | End: 2020-08-26
Payer: MEDICARE

## 2020-01-01 ENCOUNTER — VIRTUAL VISIT (OUTPATIENT)
Dept: PULMONOLOGY | Age: 80
End: 2020-01-01
Payer: MEDICARE

## 2020-01-01 ENCOUNTER — NURSE ONLY (OUTPATIENT)
Dept: FAMILY MEDICINE CLINIC | Age: 80
End: 2020-01-01
Payer: MEDICARE

## 2020-01-01 ENCOUNTER — OFFICE VISIT (OUTPATIENT)
Dept: PULMONOLOGY | Age: 80
End: 2020-01-01
Payer: MEDICARE

## 2020-01-01 ENCOUNTER — TELEPHONE (OUTPATIENT)
Dept: PULMONOLOGY | Age: 80
End: 2020-01-01

## 2020-01-01 ENCOUNTER — OFFICE VISIT (OUTPATIENT)
Dept: NEUROLOGY | Age: 80
End: 2020-01-01
Payer: MEDICARE

## 2020-01-01 ENCOUNTER — VIRTUAL VISIT (OUTPATIENT)
Dept: FAMILY MEDICINE CLINIC | Age: 80
End: 2020-01-01
Payer: MEDICARE

## 2020-01-01 VITALS
BODY MASS INDEX: 32.65 KG/M2 | WEIGHT: 172.8 LBS | HEART RATE: 72 BPM | DIASTOLIC BLOOD PRESSURE: 87 MMHG | SYSTOLIC BLOOD PRESSURE: 137 MMHG

## 2020-01-01 PROCEDURE — 1036F TOBACCO NON-USER: CPT | Performed by: PSYCHIATRY & NEUROLOGY

## 2020-01-01 PROCEDURE — 99442 PR PHYS/QHP TELEPHONE EVALUATION 11-20 MIN: CPT | Performed by: INTERNAL MEDICINE

## 2020-01-01 PROCEDURE — G8427 DOCREV CUR MEDS BY ELIG CLIN: HCPCS | Performed by: FAMILY MEDICINE

## 2020-01-01 PROCEDURE — 99214 OFFICE O/P EST MOD 30 MIN: CPT | Performed by: FAMILY MEDICINE

## 2020-01-01 PROCEDURE — 1090F PRES/ABSN URINE INCON ASSESS: CPT | Performed by: PSYCHIATRY & NEUROLOGY

## 2020-01-01 PROCEDURE — 1123F ACP DISCUSS/DSCN MKR DOCD: CPT | Performed by: FAMILY MEDICINE

## 2020-01-01 PROCEDURE — G8399 PT W/DXA RESULTS DOCUMENT: HCPCS | Performed by: FAMILY MEDICINE

## 2020-01-01 PROCEDURE — 99214 OFFICE O/P EST MOD 30 MIN: CPT | Performed by: PSYCHIATRY & NEUROLOGY

## 2020-01-01 PROCEDURE — 93971 EXTREMITY STUDY: CPT

## 2020-01-01 PROCEDURE — 1123F ACP DISCUSS/DSCN MKR DOCD: CPT | Performed by: PSYCHIATRY & NEUROLOGY

## 2020-01-01 PROCEDURE — G8399 PT W/DXA RESULTS DOCUMENT: HCPCS | Performed by: PSYCHIATRY & NEUROLOGY

## 2020-01-01 PROCEDURE — 1090F PRES/ABSN URINE INCON ASSESS: CPT | Performed by: FAMILY MEDICINE

## 2020-01-01 PROCEDURE — G8427 DOCREV CUR MEDS BY ELIG CLIN: HCPCS | Performed by: PSYCHIATRY & NEUROLOGY

## 2020-01-01 PROCEDURE — G0008 ADMIN INFLUENZA VIRUS VAC: HCPCS | Performed by: FAMILY MEDICINE

## 2020-01-01 PROCEDURE — 90694 VACC AIIV4 NO PRSRV 0.5ML IM: CPT | Performed by: FAMILY MEDICINE

## 2020-01-01 PROCEDURE — 4040F PNEUMOC VAC/ADMIN/RCVD: CPT | Performed by: FAMILY MEDICINE

## 2020-01-01 PROCEDURE — 4040F PNEUMOC VAC/ADMIN/RCVD: CPT | Performed by: PSYCHIATRY & NEUROLOGY

## 2020-01-01 PROCEDURE — 99214 OFFICE O/P EST MOD 30 MIN: CPT | Performed by: INTERNAL MEDICINE

## 2020-01-01 PROCEDURE — G8417 CALC BMI ABV UP PARAM F/U: HCPCS | Performed by: PSYCHIATRY & NEUROLOGY

## 2020-01-01 RX ORDER — DONEPEZIL HYDROCHLORIDE 5 MG/1
5 TABLET, FILM COATED ORAL NIGHTLY
Qty: 30 TABLET | Refills: 3 | Status: SHIPPED | OUTPATIENT
Start: 2020-01-01 | End: 2020-01-01 | Stop reason: SDUPTHER

## 2020-01-01 RX ORDER — AMLODIPINE BESYLATE 5 MG/1
TABLET ORAL
Qty: 90 TABLET | Refills: 4 | Status: SHIPPED | OUTPATIENT
Start: 2020-01-01 | End: 2021-01-01 | Stop reason: SDUPTHER

## 2020-01-01 RX ORDER — ATORVASTATIN CALCIUM 20 MG/1
20 TABLET, FILM COATED ORAL DAILY
Qty: 90 TABLET | Refills: 4 | Status: SHIPPED | OUTPATIENT
Start: 2020-01-01 | End: 2021-01-01 | Stop reason: SDUPTHER

## 2020-01-01 RX ORDER — ALENDRONATE SODIUM 70 MG/1
70 TABLET ORAL
Qty: 12 TABLET | Refills: 4 | Status: SHIPPED | OUTPATIENT
Start: 2020-01-01 | End: 2021-01-01 | Stop reason: SDUPTHER

## 2020-01-01 RX ORDER — ESCITALOPRAM OXALATE 10 MG/1
10 TABLET ORAL DAILY
Qty: 90 TABLET | Refills: 1 | Status: SHIPPED | OUTPATIENT
Start: 2020-01-01 | End: 2020-01-01 | Stop reason: SDUPTHER

## 2020-01-01 RX ORDER — DONEPEZIL HYDROCHLORIDE 5 MG/1
5 TABLET, FILM COATED ORAL NIGHTLY
Qty: 90 TABLET | Refills: 4 | Status: SHIPPED | OUTPATIENT
Start: 2020-01-01 | End: 2021-01-01 | Stop reason: SDUPTHER

## 2020-01-01 RX ORDER — FLUTICASONE PROPIONATE 50 MCG
2 SPRAY, SUSPENSION (ML) NASAL DAILY
Qty: 1 BOTTLE | Refills: 5 | Status: SHIPPED | OUTPATIENT
Start: 2020-01-01 | End: 2020-01-01 | Stop reason: SDUPTHER

## 2020-01-01 RX ORDER — ANTACID TABLETS 648 MG/1
1 TABLET, CHEWABLE ORAL 2 TIMES DAILY
Qty: 180 TABLET | Refills: 4 | Status: ON HOLD | OUTPATIENT
Start: 2020-01-01 | End: 2021-01-01 | Stop reason: HOSPADM

## 2020-01-01 RX ORDER — ALENDRONATE SODIUM 70 MG/1
70 TABLET ORAL
Qty: 12 TABLET | Refills: 3 | OUTPATIENT
Start: 2020-01-01

## 2020-01-01 RX ORDER — FLUTICASONE PROPIONATE 50 MCG
2 SPRAY, SUSPENSION (ML) NASAL DAILY
Qty: 1 BOTTLE | Refills: 5 | Status: SHIPPED | OUTPATIENT
Start: 2020-01-01 | End: 2021-01-01 | Stop reason: SDUPTHER

## 2020-01-01 RX ORDER — UMECLIDINIUM BROMIDE AND VILANTEROL TRIFENATATE 62.5; 25 UG/1; UG/1
1 POWDER RESPIRATORY (INHALATION) DAILY
Qty: 30 PUFF | Refills: 3 | Status: SHIPPED | OUTPATIENT
Start: 2020-01-01 | End: 2021-01-01 | Stop reason: SDUPTHER

## 2020-01-01 RX ORDER — ESCITALOPRAM OXALATE 10 MG/1
10 TABLET ORAL DAILY
Qty: 90 TABLET | Refills: 1 | Status: SHIPPED | OUTPATIENT
Start: 2020-01-01 | End: 2021-01-01 | Stop reason: SDUPTHER

## 2020-01-01 RX ORDER — ALENDRONATE SODIUM 70 MG/1
70 TABLET ORAL
Qty: 12 TABLET | Refills: 3 | Status: SHIPPED | OUTPATIENT
Start: 2020-01-01 | End: 2020-01-01 | Stop reason: SDUPTHER

## 2020-01-01 ASSESSMENT — ENCOUNTER SYMPTOMS
SHORTNESS OF BREATH: 1
SHORTNESS OF BREATH: 0
BACK PAIN: 0
COLOR CHANGE: 0
COUGH: 1
CHEST TIGHTNESS: 0
TROUBLE SWALLOWING: 0
VOMITING: 0
NAUSEA: 0
TROUBLE SWALLOWING: 0
VOICE CHANGE: 0
SINUS PRESSURE: 0
VOMITING: 0
COUGH: 0
WHEEZING: 1
SORE THROAT: 0
VOICE CHANGE: 0
ABDOMINAL PAIN: 0
EYE DISCHARGE: 0
PHOTOPHOBIA: 0
ABDOMINAL PAIN: 0
TROUBLE SWALLOWING: 0
CHOKING: 0
DIARRHEA: 0
EYE ITCHING: 0
NAUSEA: 0
NAUSEA: 0
SORE THROAT: 0
VOMITING: 0
SHORTNESS OF BREATH: 1
SINUS PRESSURE: 0
WHEEZING: 0
RHINORRHEA: 0
EYE DISCHARGE: 0
RHINORRHEA: 0
DIARRHEA: 0
CHEST TIGHTNESS: 0
EYE ITCHING: 0

## 2020-01-02 ENCOUNTER — HOSPITAL ENCOUNTER (OUTPATIENT)
Dept: PHYSICAL THERAPY | Age: 80
Setting detail: THERAPIES SERIES
Discharge: HOME | End: 2020-01-02
Payer: MEDICARE

## 2020-01-02 ENCOUNTER — HOSPITAL ENCOUNTER (OUTPATIENT)
Dept: SPEECH THERAPY | Age: 80
Setting detail: THERAPIES SERIES
Discharge: HOME | End: 2020-01-02
Payer: MEDICARE

## 2020-01-02 ENCOUNTER — HOSPITAL ENCOUNTER (OUTPATIENT)
Dept: OCCUPATIONAL THERAPY | Age: 80
Setting detail: THERAPIES SERIES
Discharge: HOME | End: 2020-01-02
Payer: MEDICARE

## 2020-01-02 PROCEDURE — 97130 THER IVNTJ EA ADDL 15 MIN: CPT

## 2020-01-02 PROCEDURE — 97129 THER IVNTJ 1ST 15 MIN: CPT

## 2020-01-02 PROCEDURE — 97112 NEUROMUSCULAR REEDUCATION: CPT

## 2020-01-02 PROCEDURE — 97530 THERAPEUTIC ACTIVITIES: CPT

## 2020-01-02 PROCEDURE — 97110 THERAPEUTIC EXERCISES: CPT

## 2020-01-02 ASSESSMENT — PAIN DESCRIPTION - ORIENTATION: ORIENTATION: RIGHT

## 2020-01-02 ASSESSMENT — PAIN SCALES - GENERAL: PAINLEVEL_OUTOF10: 4

## 2020-01-02 ASSESSMENT — PAIN DESCRIPTION - LOCATION: LOCATION: HIP;BUTTOCKS;LEG

## 2020-01-02 ASSESSMENT — PAIN DESCRIPTION - PAIN TYPE: TYPE: CHRONIC PAIN

## 2020-01-02 ASSESSMENT — PAIN DESCRIPTION - DESCRIPTORS: DESCRIPTORS: ACHING
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
## 2020-01-02 NOTE — PROGRESS NOTES
[address]     Ph: [phone]  Fax: [phone]    [] Certification  [x] Recertification []  Plan of Care  [x] Progress Note [] Discharge      To:  Dr. Asher Higuera      From:  Agustin Hope PT DPT  Patient: Maricel Bourne     : 1940  Diagnosis: Ataxic gait     Date: 2020  Treatment Diagnosis: Abnormality of gait    Plan of Care/Certification Expiration Date: 20  Progress Report Period from:  12/3/2019  to 2020    Total # of Visits to Date: 7   No Show: 0    Canceled Appointment: 2     OBJECTIVE:   Long Term Goals - Time Frame for Long term goals : 4 weeks  Goals Current/ Discharge status Met   Long term goal 1: Improve vera LE strength to >/= 4+/5 to improve stability with standing and walking. Not formally tested however decreased SLS suggesting ongoing decreased hip strength [] yes  [x] no   Long term goal 2: Pt will be able to ambulate >/= 250' with minimal to no deviations S/I. Pt ambulates > 250ft with NBOS [x] yes  [x] no   Long term goal 3: DGI >/=  to reduce pt's risk for falls. DGI= , goal met [x] yes  [] no   Long term goal 4: Pt will be able to ambulate with dual tasking with minimal to no deviations. Pt with minimal deviations, however difficulty with cooridination and recall [x] yes  [x] no     NEW GOALS: FGA >/=  to reduce pt's risk for falls. [] yes  [] no        Body structures, Functions, Activity limitations: Decreased functional mobility , Decreased strength, Decreased balance, Increased pain, Decreased ROM  Assessment: Pt meeting DGI goal this date, . Pt continues to demo difficulty stepping over hurdles, often circumducting to clear w/o VCs. Pt continues to demo difficulty coordinating UE/LE tasks. Pt with difficulty with dual tasking with recall but demos minimal gait deviations.   Pt would benefit with continued therapy to advance dynamic balance and cooridination. Prognosis: Good  Discharge Recommendations: Continue to assess pending progress    PLAN: [] Evaluate and Treat  Frequency/Duration:  Plan  Times per week: 1-2  Plan weeks: 4  Current Treatment Recommendations: Strengthening, ROM, Balance Training, Functional Mobility Training, Transfer Training, Gait Training, Stair training, Neuromuscular Re-education, Manual Therapy - Soft Tissue Mobilization, Home Exercise Program, Safety Education & Training, Patient/Caregiver Education & Training, Equipment Evaluation, Education, & procurement, Modalities       Patient Status:[x] Continue/ Initiate plan of Care    [] Discharge PT. Recommend pt continue with HEP. [x] Additional visits requested, Please re-certify for additional visits: 4-8          Signature:Obj. Info by: Electronically signed by Taiwo Grider PTA on 1/2/20 at 12:55 PM  Electronically signed by Danilo Pappas PT on 1/3/2020 at 3:30 PM    If you have any questions or concerns, please don't hesitate to call.   Thank you for your referral.

## 2020-01-02 NOTE — PROGRESS NOTES
[address] [address]  Outpatient Physical Therapy    Treatment Note        Date: 2020  Patient: Kingsley Johnson  : 1940  ACCT #: [de-identified]  Referring Practitioner: Dr. Pedro Patel  Diagnosis: Ataxic gait    Visit Information:  PT Visit Information  PT Insurance Information: Medicare  Total # of Visits to Date: 7  Plan of Care/Certification Expiration Date: 20  No Show: 0  Progress Note Due Date: 19  Canceled Appointment: 2  Progress Note Counter:      Subjective: Pt reports feeling better from illness     HEP Compliance:  [x] Good [] Fair [] Poor [] Reports not doing due to:    Vital Signs  Patient Currently in Pain: Yes   Pain Screening  Patient Currently in Pain: Yes  Pain Assessment  Pain Assessment: 0-10  Pain Level: 4  Pain Type: Chronic pain  Pain Location: Hip; Buttocks;Leg  Pain Orientation: Right  Pain Descriptors: Aching    OBJECTIVE:   Exercises  Exercise 2: air ex: Stepping on/off x10, F/l  Exercise 4: ambulation with boom whackers and catergorizing x3laps  Exercise 5: Gait drills; march with reach. march with opp , inability to swing march  Exercise 7: DGI= , goal met  Exercise 12: 4-way hip YTB x10 B/L  Exercise 13: Obstacle course with and w/o carrying ball on cone  Exercise 20: HEP: 4 way hip with TB       Strength: [x] NT  [] MMT completed:        *Indicates exercise, modality, or manual techniques to be initiated when appropriate    Assessment: Body structures, Functions, Activity limitations: Decreased functional mobility , Decreased strength, Decreased balance, Increased pain, Decreased ROM  Assessment: Pt meeting DGI goal this date, . Pt continues to demo difficulty stepping over hurdles, often circumducting to clear w/o VCs. Pt continues to demo difficulty cooridinating ue/le tasks. Dual tasking recall challenged vs. Gait deviations. Pt would benefit with continued therapy to advance dynamic balance and cooridination.   Treatment Diagnosis: Abnormality of gait  Prognosis: Good       Goals:       Long term goals  Time Frame for Long term goals : 4 weeks  Long term goal 1: Improve vera LE strength to >/= 4+/5 to improve stability with standing and walking. Long term goal 2: Pt will be able to ambulate >/= 250' with minimal to no deviations S/I. Long term goal 3: DGI >/= 22/24 to reduce pt's risk for falls. Long term goal 4: Pt will be able to ambulate with dual tasking with minimal to no deviations. Progress toward goals:    POST-PAIN       Pain Rating (0-10 pain scale): 4  /10   Location and pain description same as pre-treatment unless indicated. Action: [] NA   [] Perform HEP  [x] Meds as prescribed  [] Modalities as prescribed   [] Call Physician     Frequency/Duration:  Plan  Times per week: 1-2  Plan weeks: 4  Current Treatment Recommendations: Strengthening, ROM, Balance Training, Functional Mobility Training, Transfer Training, Gait Training, Stair training, Neuromuscular Re-education, Manual Therapy - Soft Tissue Mobilization, Home Exercise Program, Safety Education & Training, Patient/Caregiver Education & Training, Equipment Evaluation, Education, & procurement, Modalities     Pt to continue current HEP. See objective section for any therapeutic exercise changes, additions or modifications this date.          PT Individual Minutes  Time In: 8437  Time Out: 1001  Minutes: 54  Timed Code Treatment Minutes: 54 Minutes  Procedure Minutes:0     Timed Activity Minutes Units   Ther Ex 10 1   Neuro 44 3       Signature:  Electronically signed by Marylene Oscar, PTA on 1/2/20 at 12:52 PM
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
## 2020-01-02 NOTE — PROGRESS NOTES
Occupational Therapy  Daily Note     Name: Brandi Goodson  : 1940  MRN: [id number]  Diagnosis: Decreased ADL's    Visit Information:   OT Insurance Information: Medicare   Progress Note Due Date: 20  Progress Note Counter:     Date: 2020    OT Therapeutic activities 33 minutes for 2 unit(s)  OT Therapeutic exercises 15 minutes for 1 unit(s)        OT Individual Minutes  Time In: 1000  Time Out: 8176  Minutes: 50    Referring Practitioner: Dr. Christine Reynoso      Subjective:  \"I have been sick with diarrhea. \"    Subjective: PCP: Dr. Carline Sánchez            Pain rating:     Pre-treatment pain:0       Pain after treatment: 0             Focus of treatment was on the following:   []? Neuromuscular Re-education    []? Orthotic/Splint/Brace: fabrication/education [x]? UE strength  []? Right []? Left [x]? Both   [x]?  Strength   []? Right []? Left [x]? Both     []? Cognition  []? Balance  []? Posture/positioning   []? Decreasing pain    [x]? Coordination []? ADL's []? Functional Mobility []? Home management    [x]? ROM []? Endurance  []? Transfers []? Fine Motor   []? Visual []? Caregiver training []? Perceptual                    []? Sensory                    [x]? Visual/Perceptual training []? Other:          Activity: BTE  UE strengthening and AROM                   2nd BTE Treatment     Pt required/had:  [x]? Level of assist: []?IND []? Mod IND  [x]? Supervision  []? Set up  []? Min []? Mod  []? Max  []? Dep       Comments:    [x]? Level of difficulty: []? Min         []? Mod  []? Max      Comments:Supr    []? Physical prompts: []? Min []? Mod []? Max []? [place]   Comments:     [x]? Increased time:      Comments:   []? Sensory break: []? Vestibular []? Tactile []? Oral motor []? Proprioceptive []? Auditory  []? Visual  Comments:      []? Verbal cues: []? Min []? Mod  []? Max  Comments:    []? Visual aid:   Comments:    [x]? Comments:   Other exercises 1: BTE #122 B UE rotation 2 lbs.   Other exercises 2: BTE #181 B UE coordination 2 lbs.  Other exercises 3: BTE #162 R UE  strengthening  2 lbs. Other exercises 4: BTE #162 L UE  strengthening  2 lbs. All tools completed the tools to the goals as set and 10% added for the next treatment session.          Activity: B UE strengthening resistive clothes pins    Pt required/had:  [x]Level of assist: []IND []Mod IND  [x]Supervision  []Set up  [] Min [] Mod  []Max  []Dep       Comments:   [x] Level of difficulty: []Min         []Mod  []Max      Comments: Supr   []Physical prompts: []Min []Mod []Max [][place]   Comments:    []Increased time:      Comments:   []Sensory break: []Vestibular []Tactile []Oral motor []Proprioceptive []Auditory  []Visual  Comments:     [x]Verbal cues: [x]Min []Mod  []Max  Comments:    [] Visual aid:   Comments:    []Comments:          Activity: Visual/perceptual with cube design    Pt required/had:  [x]Level of assist: []IND []Mod IND  []Supervision  []Set up  [] Min [] Mod  []Max  [x]Dep       Comments:   [x] Level of difficulty: []Min         []Mod  [x]Max      Comments:    []Physical prompts: []Min []Mod []Max [][place]   Comments:    [x]Increased time:      Comments:   []Sensory break: []Vestibular []Tactile []Oral motor []Proprioceptive []Auditory  []Visual  Comments:     [x]Verbal cues: []Min []Mod  [x]Max  Comments:    [] Visual aid:   Comments:    [x]Comments: The patient was unable to make even 1 cube.       Activity: Perfection game visual/perceptual     Pt required/had:  [x]? Level of assist: []?IND []? Mod IND  [x]? Supervision  []? Set up  []? Min []? Mod  []? Max  []? Dep       Comments:    [x]? Level of difficulty: []? Min         []? Mod  []? Max      Comments: Supr   []? Physical prompts: []? Min []? Mod []? Max []? [place]   Comments:     [x]? Increased time:      Comments:   []? Sensory break: []? Vestibular []? Tactile []? Oral motor []? Proprioceptive []? Auditory  []? Visual  Comments:      []? Verbal cues: []? Min []? Mod  []? Max  Comments:    []?  Visual aid:   Comments: []?Comments:                          Assessment:   Pt tolerated treatment well. Patient was seen for the last 12 minutes with the lead OTR for medicare guidelines. Plan:   Continue POC        Goals:1-3,6 addressed  Long term goals  Time Frame for Long term goals : 2x week 6-8 visits   Long term goal 1: Pt will increase BUE shoulder strength from current to 4/5 to increase performance with I/ADL's. Long term goal 2: Pt will increase B  strength by 10 lbs from current  to increase performance with I/ADL's  Long term goal 3:  Pt will increase dexterity in R hand as observed by 9 hole peg test by decreasing time from current  to  22 seconds to increase performance with I/ADL's. Long term goal 4: Pt will IND'ly and safely make simple meals without errors. Long term goal 5: Pt will utilize adaptive techniques to increase IND with medication management.   Long term goals 6: Pt will draw a clock without errors with correct time as stated by therapist.    QING Mg   1/2/2020  10:54 AM   Electronically signed by ENRIQUETA Mg on 1/2/20 at 10:57 AM

## 2020-01-07 ENCOUNTER — HOSPITAL ENCOUNTER (OUTPATIENT)
Dept: SPEECH THERAPY | Age: 80
Setting detail: THERAPIES SERIES
Discharge: HOME | End: 2020-01-07
Payer: MEDICARE

## 2020-01-07 ENCOUNTER — HOSPITAL ENCOUNTER (OUTPATIENT)
Dept: PHYSICAL THERAPY | Age: 80
Setting detail: THERAPIES SERIES
Discharge: HOME | End: 2020-01-07
Payer: MEDICARE

## 2020-01-07 ENCOUNTER — HOSPITAL ENCOUNTER (OUTPATIENT)
Dept: OCCUPATIONAL THERAPY | Age: 80
Setting detail: THERAPIES SERIES
Discharge: HOME | End: 2020-01-07
Payer: MEDICARE

## 2020-01-07 PROCEDURE — 97112 NEUROMUSCULAR REEDUCATION: CPT

## 2020-01-07 PROCEDURE — 97110 THERAPEUTIC EXERCISES: CPT

## 2020-01-07 PROCEDURE — 97530 THERAPEUTIC ACTIVITIES: CPT

## 2020-01-07 ASSESSMENT — PAIN SCALES - GENERAL: PAINLEVEL_OUTOF10: 0

## 2020-01-07 NOTE — PROGRESS NOTES
[address] [address]  Outpatient Physical Therapy    Treatment Note        Date: 2020  Patient: Melissa Waller  : 1940  ACCT #: [de-identified]  Referring Practitioner: Dr. Shaq Abdi  Diagnosis: Ataxic gait    Visit Information:  PT Visit Information  PT Insurance Information: Medicare  Total # of Visits to Date: 8  Plan of Care/Certification Expiration Date: 20  No Show: 0  Progress Note Due Date: 20  Canceled Appointment: 2  Progress Note Counter:     Subjective: Pt reports going to orthopedic tomorrow for pain in hips. States continues to have pain in hip with WB. HEP Compliance:  [x] Good [] Fair [] Poor [] Reports not doing due to:    Vital Signs  Patient Currently in Pain: Denies   Pain Screening  Patient Currently in Pain: Denies  Pain Assessment  Pain Assessment: 0-10  Pain Level: 0    OBJECTIVE:   Exercises  Exercise 4: Side step with ball toss on wall   Exercise 5: Gait drills: cross crawl marching x2 laps in // bars, march with opp UE raise - modified to sitting with TC  Exercise 7: FGA 25/30   Exercise 12: 4-way hip YTB x15 B/L  Exercise 13: Obstacle course: over hurdles, foam mat over bean bags, on/off airex with and w/o carrying ball on cone    *Indicates exercise, modality, or manual techniques to be initiated when appropriate    Assessment: Body structures, Functions, Activity limitations: Decreased functional mobility , Decreased strength, Decreased balance, Increased pain, Decreased ROM  Assessment: Pt with baseline score of 25/30 on FGA, with most challenge with NBOS. Pt demonstrates challenge with marching with opp shoulder flexion in sitting and standing. Instructed to practice in sitting at home. Pt agreeable to decreased frequency of tx with pt completing strengthening exercises at home and focusing on balance and gait safety in tx.   Treatment Diagnosis: Abnormality of gait  Prognosis: Good     Goals:  Long term goals  Time Frame for Long term goals :

## 2020-01-07 NOTE — PROGRESS NOTES
Occupational Therapy  Daily Note     Name: Maxi Arevalo  : 1940  MRN: [id number]  Diagnosis: Decreased ADL's    Visit Information:   OT Insurance Information: Medicare   Progress Note Due Date: 20  Canceled Appointment: 1  Progress Note Counter:     Date: 2020    OT Therapeutic activities 60 minutes for 4 unit(s)       OT Individual Minutes  Time In: 1500  Time Out: 1600  Minutes: 61    Referring Practitioner: Dr. Linda Ross               Subjective:  \"My grandson asked me to sew a button on for him the other day. I did it but it was really hard. \"  Subjective: PCP: Dr. Arianna Cote            Pain rating:     Pre-treatment pain:0       Pain after treatment: 0               Focus of treatment was on the following:   []? ?Neuromuscular Re-education    []? ?Orthotic/Splint/Brace: fabrication/education [x]? ?UE strength  []? ? Right []? ? Left [x]? ? Both   [x]? ?  Strength   []? ? Right []? ? Left [x]? ? Both     []? ? Cognition  []? ?Balance  []? ?Posture/positioning   []? ?Decreasing pain    [x]? ? Coordination []? ?ADL's []? ? Functional Mobility []? ? Home management    [x]? ?ROM []? ?Endurance  []? ?Transfers []? ? Fine Motor   []? ? Visual []? ?Caregiver training []? ?Perceptual                    []? ?Sensory                    [x]? ? Visual/Perceptual training []? ? Other:          Activity: BTE  UE strengthening and AROM                   2nd BTE Treatment     Pt required/had:  [x]? ? Level of assist: []??IND []? ?Mod IND  [x]? ?Supervision  []? ?Set up  []? ? Min []? ? Mod  []? ? Max  []? ?Dep       Comments:    [x]? ? Level of difficulty: [x]? ? Min         []? ? Mod  []? ? Max      Comments:   []? ?Physical prompts: []??Min []? ?Mod []? ?Max []? ? [place]   Comments:     [x]? ?Increased time:      Comments:   []? ?Sensory break: []??Vestibular []? ?Tactile []? ?Oral motor []? ? Proprioceptive []? ? Auditory  []? ? Visual  Comments:      []? ?Verbal cues: []??Min []? ? Mod  []? ? Max  Comments:    []?? Visual aid:   Comments: [x]??Comments:   Other exercises 1: BTE #122 B UE rotation 2 lbs. Other exercises 2: BTE #181 B UE coordination 2 lbs. Other exercises 3: BTE #162 R UE  strengthening  2 lbs. Other exercises 4: BTE #162 L UE  strengthening  2 lbs. All tools completed the tools to the goals as set and 10% added for the next treatment session.          Activity: B UE strengthening resistive clothes pins     Pt required/had:  [x]? Level of assist: []?IND []? Mod IND  [x]? Supervision  []? Set up  []? Min []? Mod  []? Max  []? Dep       Comments:    [x]? Level of difficulty: []? Min         []? Mod  []? Max      Comments: Supr   []? Physical prompts: []? Min []? Mod []? Max []? [place]   Comments:     []? Increased time:      Comments:   []? Sensory break: []? Vestibular []? Tactile []? Oral motor []? Proprioceptive []? Auditory  []? Visual  Comments:      [x]? Verbal cues: [x]? Min []? Mod  []? Max  Comments:    []? Visual aid:   Comments:    []? Comments:                    Activity: Perfection game visual/perceptual     Pt required/had:  [x]? ? Level of assist: []??IND []? ?Mod IND  [x]? ?Supervision  []? ?Set up  []? ? Min []? ? Mod  []? ? Max  []? ?Dep       Comments:    [x]? ? Level of difficulty: [x]? ? Min         []? ? Mod  []? ? Max      Comments:    []? ?Physical prompts: []??Min []? ?Mod []? ?Max []? ? [place]   Comments:     [x]? ?Increased time:      Comments:   []? ?Sensory break: []??Vestibular []? ?Tactile []? ?Oral motor []? ? Proprioceptive []? ? Auditory  []? ? Visual  Comments:      []? ?Verbal cues: []??Min []? ? Mod  []? ? Max  Comments:    []?? Visual aid:   Comments:    [x]? ? Comments: After performing one time this date had the patient work against the timer on the game and she completed 6 of 25 shapes in one minute.                Assessment:   Pt tolerated treatment well. Patient was seen with her son present. Plan:   Continue POC        Goals:1-3, addressed.   Long term goals  Time Frame for Long term goals : 2x week 6-8 visits   Long term goal 1: Pt will increase

## 2020-01-08 ENCOUNTER — OFFICE VISIT (OUTPATIENT)
Dept: NEUROLOGY | Age: 80
End: 2020-01-08
Payer: MEDICARE

## 2020-01-08 VITALS
SYSTOLIC BLOOD PRESSURE: 133 MMHG | DIASTOLIC BLOOD PRESSURE: 82 MMHG | HEART RATE: 92 BPM | OXYGEN SATURATION: 95 % | RESPIRATION RATE: 16 BRPM | HEIGHT: 61 IN | BODY MASS INDEX: 28.89 KG/M2 | WEIGHT: 153 LBS

## 2020-01-08 PROCEDURE — 4040F PNEUMOC VAC/ADMIN/RCVD: CPT | Performed by: NURSE PRACTITIONER

## 2020-01-08 PROCEDURE — G8482 FLU IMMUNIZE ORDER/ADMIN: HCPCS | Performed by: NURSE PRACTITIONER

## 2020-01-08 PROCEDURE — G8399 PT W/DXA RESULTS DOCUMENT: HCPCS | Performed by: NURSE PRACTITIONER

## 2020-01-08 PROCEDURE — 1036F TOBACCO NON-USER: CPT | Performed by: NURSE PRACTITIONER

## 2020-01-08 PROCEDURE — 1090F PRES/ABSN URINE INCON ASSESS: CPT | Performed by: NURSE PRACTITIONER

## 2020-01-08 PROCEDURE — G8417 CALC BMI ABV UP PARAM F/U: HCPCS | Performed by: NURSE PRACTITIONER

## 2020-01-08 PROCEDURE — G8427 DOCREV CUR MEDS BY ELIG CLIN: HCPCS | Performed by: NURSE PRACTITIONER

## 2020-01-08 PROCEDURE — 99214 OFFICE O/P EST MOD 30 MIN: CPT | Performed by: NURSE PRACTITIONER

## 2020-01-08 PROCEDURE — 1123F ACP DISCUSS/DSCN MKR DOCD: CPT | Performed by: NURSE PRACTITIONER

## 2020-01-08 ASSESSMENT — ENCOUNTER SYMPTOMS
COUGH: 0
SHORTNESS OF BREATH: 0
COLOR CHANGE: 0
TROUBLE SWALLOWING: 0
VOMITING: 0
ABDOMINAL PAIN: 0
WHEEZING: 0
CHEST TIGHTNESS: 0
NAUSEA: 0

## 2020-01-08 NOTE — PROGRESS NOTES
Financial resource strain: Not on file    Food insecurity:     Worry: Never true     Inability: Not on file    Transportation needs:     Medical: No     Non-medical: No   Tobacco Use    Smoking status: Former Smoker     Packs/day: 0.75     Years: 45.00     Pack years: 33.75     Types: Cigarettes     Start date:      Last attempt to quit: 2019     Years since quittin.9    Smokeless tobacco: Never Used   Substance and Sexual Activity    Alcohol use: No     Comment: social    Drug use: No    Sexual activity: Not on file   Lifestyle    Physical activity:     Days per week: 0 days     Minutes per session: 0 min    Stress: Only a little   Relationships    Social connections:     Talks on phone: More than three times a week     Gets together: More than three times a week     Attends Nondenominational service: More than 4 times per year     Active member of club or organization: No     Attends meetings of clubs or organizations: Never     Relationship status:     Intimate partner violence:     Fear of current or ex partner: No     Emotionally abused: No     Physically abused: No     Forced sexual activity: No   Other Topics Concern    Not on file   Social History Narrative         Lives With: Alone son is helpful and is her POA for health care    Type of Home: House    Home Layout: One level    Home Access: Stairs to enter with rails    Entrance Stairs - Number of Steps: 2    Bathroom Shower/Tub: Tub/Shower unit, Walk-in shower    Bathroom Equipment: Hand-held shower    ADL Assistance: Independent    Homemaking Assistance: Independent    Ambulation Assistance: Independent(No AD)    Transfer Assistance: Independent    Active : Yes    Additional Comments: Pt. reports she has fallen at home but not recently and not regularly.  Pt. having word finding difficulty, but sons are present and clarify pt's answers     Family History   Problem Relation Age of Onset    Diabetes Other         GRANDMOTHER No Known Allergies  Current Outpatient Medications on File Prior to Visit   Medication Sig Dispense Refill    albuterol sulfate  (90 Base) MCG/ACT inhaler Inhale 2 puffs into the lungs every morning (before breakfast) 1 Inhaler 3    atorvastatin (LIPITOR) 20 MG tablet Take 1 tablet by mouth daily 30 tablet 3    aspirin 81 MG EC tablet Take 1 tablet by mouth daily 30 tablet 3    fluticasone (FLONASE) 50 MCG/ACT nasal spray 2 sprays by Nasal route daily 1 Bottle 5    alendronate (FOSAMAX) 70 MG tablet Take 1 tablet by mouth every 7 days 12 tablet 3    calcium carbonate 648 MG TABS Take 1 tablet by mouth 2 times daily 60 tablet 11    amLODIPine (NORVASC) 5 MG tablet TAKE 1 TABLET BY MOUTH DAILY. 90 tablet 1    Multiple Vitamins-Minerals (PRESERVISION AREDS 2) CAPS Take 1 capsule by mouth daily 90 capsule 3    coenzyme Q10 100 MG CAPS capsule Take 100 mg by mouth daily      Ascorbic Acid (VITAMIN C) 500 MG tablet Take 500 mg by mouth daily.  vitamin D (CHOLECALCIFEROL) 1000 UNIT TABS tablet Take 1,000 Int'l Units by mouth daily. No current facility-administered medications on file prior to visit. Review of Systems   Constitutional: Negative for appetite change, chills, fatigue and fever. HENT: Negative for hearing loss and trouble swallowing. Eyes: Negative for visual disturbance. Respiratory: Negative for cough, chest tightness, shortness of breath and wheezing. Cardiovascular: Negative for chest pain, palpitations and leg swelling. Gastrointestinal: Negative for abdominal pain, nausea and vomiting. Musculoskeletal: Positive for gait problem (unsteady at times). Skin: Negative for color change and rash. Neurological: Negative for dizziness, tremors, seizures, syncope, facial asymmetry, speech difficulty, weakness, light-headedness, numbness and headaches. Psychiatric/Behavioral: Positive for confusion (forgetful at times).  Negative for agitation and hallucinations. The patient is not nervous/anxious. Objective:   /82 (Site: Right Upper Arm, Position: Sitting, Cuff Size: Large Adult)   Pulse 92   Resp 16   Ht 5' 1\" (1.549 m)   Wt 153 lb (69.4 kg)   LMP  (LMP Unknown)   SpO2 95%   BMI 28.91 kg/m²     Physical Exam  Vitals signs reviewed. Constitutional:       General: She is not in acute distress. Appearance: She is not diaphoretic. HENT:      Head: Normocephalic and atraumatic. Eyes:      Extraocular Movements: Extraocular movements intact. Pupils: Pupils are equal, round, and reactive to light. Cardiovascular:      Rate and Rhythm: Normal rate and regular rhythm. Pulmonary:      Effort: Pulmonary effort is normal. No respiratory distress. Breath sounds: Normal breath sounds. Abdominal:      General: Bowel sounds are normal. There is no distension. Palpations: Abdomen is soft. Tenderness: There is no tenderness. Skin:     General: Skin is warm and dry. Neurological:      General: No focal deficit present. Mental Status: She is alert and oriented to person, place, and time. Cranial Nerves: No cranial nerve deficit. Sensory: No sensory deficit. Motor: No weakness. Deep Tendon Reflexes: Reflexes normal.         No results found.     Lab Results   Component Value Date    WBC 4.0 11/13/2019    RBC 4.29 11/13/2019    RBC 4.30 02/06/2012    HGB 13.0 11/13/2019    HCT 40.1 11/13/2019    MCV 93.4 11/13/2019    MCH 30.3 11/13/2019    MCHC 32.5 11/13/2019    RDW 13.9 11/13/2019     11/13/2019    MPV 8.9 07/20/2014     Lab Results   Component Value Date     11/13/2019    K 3.6 11/13/2019     11/13/2019    CO2 29 11/13/2019    BUN 21 11/13/2019    CREATININE 0.66 11/13/2019    GFRAA >60.0 11/13/2019    LABGLOM >60.0 11/13/2019    GLUCOSE 89 11/13/2019    GLUCOSE 83 02/06/2012    PROT 7.1 11/12/2019    LABALBU 4.0 11/12/2019    LABALBU 4.0 02/06/2012    CALCIUM 9.3

## 2020-01-09 ENCOUNTER — HOSPITAL ENCOUNTER (OUTPATIENT)
Dept: SPEECH THERAPY | Age: 80
Setting detail: THERAPIES SERIES
Discharge: HOME | End: 2020-01-09
Payer: MEDICARE

## 2020-01-09 ENCOUNTER — APPOINTMENT (OUTPATIENT)
Dept: PHYSICAL THERAPY | Age: 80
End: 2020-01-09
Payer: MEDICARE

## 2020-01-09 ENCOUNTER — HOSPITAL ENCOUNTER (OUTPATIENT)
Dept: OCCUPATIONAL THERAPY | Age: 80
Setting detail: THERAPIES SERIES
Discharge: HOME | End: 2020-01-09
Payer: MEDICARE

## 2020-01-09 PROCEDURE — 97130 THER IVNTJ EA ADDL 15 MIN: CPT

## 2020-01-09 PROCEDURE — 97530 THERAPEUTIC ACTIVITIES: CPT

## 2020-01-09 PROCEDURE — 97110 THERAPEUTIC EXERCISES: CPT

## 2020-01-09 PROCEDURE — 97129 THER IVNTJ 1ST 15 MIN: CPT

## 2020-01-09 NOTE — PROGRESS NOTES
[]??? Visual aid:   Comments:    [x]? ? ? Comments:   Other exercises 1: BTE #122 B UE rotation 2 lbs. Other exercises 2: BTE #181 B UE coordination 2 lbs. Other exercises 3: BTE #162 R UE  strengthening  2 lbs. Other exercises 4: BTE #162 L UE  strengthening  2 lbs. All tools completed the tools to the goals as set and all tools were increased in resistance by 1 pound.                   Activity: Perfection game visual/perceptual     Pt required/had:  [x]? ? ? Level of assist: []???IND []? ? ? Mod IND  [x]? ? ? Supervision  []? ? ?Set up  []??? Min []? ?? Mod  []? ? ? Max  []? ? ? Dep       Comments:    [x]? ?? Level of difficulty: [x]? ? ? Min         []? ? ? Mod  []? ? ? Max      Comments:    []? ? ?Physical prompts: []? ? ? Min []? ? ? Mod []? ? ?Max []? ??[place]   Comments:     [x]? ? ? Increased time:      Comments:   []? ? ?Sensory break: []? ? ? Vestibular []? ? ? Tactile []? ? ? Oral motor []? ? ? Proprioceptive []? ? ? Auditory  []? ? ? Visual  Comments:      []? ? ?Verbal cues: []? ? ? Min []? ? ? Mod  []? ? ? Max  Comments:    []??? Visual aid:   Comments:    [x]? ? ? Comments:  This date of 1/9/2020 the patient completed 19 of 25 in 3 minutes.       Activity: Perception/draw a clock    Pt required/had:  [x]Level of assist: []IND []Mod IND  []Supervision  []Set up  [] Min [] Mod  []Max  []Dep       Comments:   [x] Level of difficulty: [x]Min         []Mod  []Max      Comments:    []Physical prompts: []Min []Mod []Max [][place]   Comments:    []Increased time:      Comments:   []Sensory break: []Vestibular []Tactile []Oral motor []Proprioceptive []Auditory  []Visual  Comments:     []Verbal cues: []Min []Mod  []Max  Comments:    [] Visual aid:   Comments:    []Comments:                Assessment:   Pt tolerated treatment well. Patient's son Rebecca Zurita was in attendance.     Plan:   Continue POC        Goals:1-3,6 addressed  Long term goals  Time Frame for Long term goals : 2x week 6-8 visits   Long term goal 1: Pt will increase BUE shoulder strength from current to 4/5 to increase

## 2020-01-09 NOTE — PROGRESS NOTES
some time restrictions. The patient completed the task 7/7 correct. She had more difficulty with the wedding venue. She did not answer correctly. She required maximum assist to determine the correct answer. Goal 4: To address pt's cognitive deficits and promote his/her ability to safely follow directives in a variety of environments, pt will carry out (verbal/written) directives of increasing complexity in everyday activities with 80% accuracy and min cues. Not addressed. Goal 5: Patient's STM to be tested with the RIPA II  The results of the RIPA II were explained. Goal met. Goal 6. Patient will participate in ongoing assessment of cognitive-linguistic skills, with additional goals to be established as appropriate. Goal 7. Patient to complete sequencing tasks from 33% to 80%. 7/9 or 78%. 8. Patient to complete  STM tasks from~60-90%. Patient completed  Auditory memory of scrambled sentences and verbally re-sequenced them:15/15  Auditory memory and verbally information manipulation to to re-sequence words to make a sentence:7/10. Pain Assessment:  Patient demonstrated no s/s of pain. Plan:  Continue with current goals    Patient/Caregiver Education:  Patient/Caregiver educated on session. Time in: 1400  Time out:1500  Minutes seen:60.   Electronically signed by ENRIKE Snow on 1/9/2020 at 2:11 PM

## 2020-01-14 ENCOUNTER — TELEPHONE (OUTPATIENT)
Dept: NEUROLOGY | Age: 80
End: 2020-01-14

## 2020-01-14 ENCOUNTER — HOSPITAL ENCOUNTER (OUTPATIENT)
Dept: PHYSICAL THERAPY | Age: 80
Setting detail: THERAPIES SERIES
Discharge: HOME | End: 2020-01-14
Payer: MEDICARE

## 2020-01-14 ENCOUNTER — HOSPITAL ENCOUNTER (OUTPATIENT)
Dept: OCCUPATIONAL THERAPY | Age: 80
Setting detail: THERAPIES SERIES
Discharge: HOME | End: 2020-01-14
Payer: MEDICARE

## 2020-01-14 ENCOUNTER — HOSPITAL ENCOUNTER (OUTPATIENT)
Dept: SPEECH THERAPY | Age: 80
Setting detail: THERAPIES SERIES
Discharge: HOME | End: 2020-01-14
Payer: MEDICARE

## 2020-01-14 PROCEDURE — 97110 THERAPEUTIC EXERCISES: CPT

## 2020-01-14 PROCEDURE — 97530 THERAPEUTIC ACTIVITIES: CPT

## 2020-01-14 PROCEDURE — 97130 THER IVNTJ EA ADDL 15 MIN: CPT

## 2020-01-14 PROCEDURE — 97112 NEUROMUSCULAR REEDUCATION: CPT

## 2020-01-14 PROCEDURE — 97129 THER IVNTJ 1ST 15 MIN: CPT

## 2020-01-14 NOTE — PROGRESS NOTES
[facility] Outpatient  Speech Language Pathology  Adult Daily Note    Kailash Barclay  1940  1/14/2020    Visit Information:  SLP Insurance Information: Medicare  Total # of Visits Approved: (Medical necessity.)  Total # of Visits to Date: 11  No Show: 0  Canceled Appointment: 2      Plan of care signed (Y/N):     signed    Progress Note Next due:   Certification Period: 1/01/2020-1/31/2020  Interventions used this date:  Cognitive Skill Development    Subjective: The patient was motivated. Behavior:  Alert, cooperative. Objective/Assessment:   Long Term Goals:  Goal 1: Pt will improve her cognition to a functional level for adequate functional recall and safety awareness for completion of ADL's and participation in home and community settings  Long Term Goals:     Short Term Goals:  Pt to be seen 1-2x/wk for 6-8 wks. Short-term Goals  Goal 1: To promote awareness and functionality of compensatory strategies in light of pt's cognitive deficits, pt will recall 3/3 memory strategies with min cues and utilize them in  structured tasks in 80% of opportunities with min cues. Patient found her way to therapy this date by remembering landmarks. Goal 2: To increase safety awareness and judgment for safe completion of ADLs secondary to pt's cognitive deficits, pt will complete abstract reasoning tasks (i.e. Word deduction, divergent naming, similarities/differences) with 80% accuracy and min cues. Not addressed. Goal 3: To increase safety awareness and judgment for safe completion of ADLs secondary to pt's cognitive deficits, pt will provide reasonable solutions to problems of everyday living with 80% accuracy and min cues. Not addressed. Goal 4: To address pt's cognitive deficits and promote his/her ability to safely follow directives in a variety of environments, pt will carry out (verbal/written) directives of increasing complexity in everyday activities with 80% accuracy and min cues.   Written:8/10,

## 2020-01-14 NOTE — PROGRESS NOTES
Occupational Therapy  Daily Note     Name: Lou Moran  : 1940  MRN: [id number]  Diagnosis: Decreased ADL's    Visit Information:   OT Insurance Information: Medicare   Progress Note Counter:     Date: 2020    OT Therapeutic activities 30 minutes for 2 unit(s)  OT Therapeutic exercises 30 minutes for 2 unit(s)        OT Individual Minutes  Time In: 1500  Time Out: 1600  Minutes: 61    Referring Practitioner: Dr. Maxime Resendiz        Subjective:\"My hip replacement surgery was moved up to 2020. \"    Subjective: PCP: Dr. Isamar Edge            Pain rating:     Pre-treatment pain:10 R hip       Pain after treatment:  10                 Focus of treatment was on the following:   []????Neuromuscular Re-education    []????Orthotic/Splint/Brace: fabrication/education []????UE strength  []????Right []?? ??Left [x]????Both   [x]? ??? Strength   []????Right []?? ??Left [x]????Both     []????Cognition  []????Balance  []????Posture/positioning   []????Decreasing pain    [x]????Coordination []????ADL's []???? Functional Mobility []?? ??Home management    [x]????ROM []????Endurance  []????Transfers []?? ?? Fine Motor   []?? ??Visual []????Caregiver training []????Perceptual                    []?? ??Sensory                    []???? Visual/Perceptual training [x]????Other: HEP's         Instructed the patient in two new HEP's of theraputty, green putty, and blue foam block, for UE strengthening. Both programs were issued in writing, green putty issued, blue foam block issued, and all demonstrated. Patient had fair to good follow through with the HEP's. Patient had some difficulty with the position of the key pinch with the blue foam block. Patients son Magaly Meléndez was in attendance and will supervise as needed and will share with his brother.          The patient was assessed for progress toward the Long Term goals as established on evaluation and the results are below.           OBJECTIVE term goal 5: Pt will utilize adaptive techniques to increase IND with medication management. SON REPORTS SHE IS SORTING HER OWN MEDICATION WITH SUPERVISION AND NOT MAKING ANY MISTAKES, AND USES A WEEKLY BOX FOR SORTING. Long term goals 6: Pt will draw a clock without errors with correct time as stated by therapist. Jayesh Griggs. [place] TO ASSESS AT THE NEXT TREATMENT SESSION.     QING Fuentes   1/14/2020  4:09 PM   Electronically signed by ENRIQUETA Fuentes on 1/14/20 at 4:24 PM

## 2020-01-14 NOTE — PROGRESS NOTES
[address] [address]  Outpatient Physical Therapy    Treatment Note        Date: 2020  Patient: Dank Tabor  : 1940  ACCT #: [de-identified]  Referring Practitioner: Dr. Ki Escalante  Diagnosis: Ataxic gait    Visit Information:  PT Visit Information  PT Insurance Information: Medicare  Total # of Visits to Date: 9  Plan of Care/Certification Expiration Date: 20  No Show: 0  Progress Note Due Date: 20  Canceled Appointment: 2  Progress Note Counter: -    Subjective: Pt states she needs Oskar hip replacements, Rt hip surgery scheduled for . HEP Compliance:  [x] Good [] Fair [] Poor [] Reports not doing due to:    Vital Signs  Patient Currently in Pain: Denies   Pain Screening  Patient Currently in Pain: Denies    OBJECTIVE:   Exercises  Exercise 1: 4 square 90 turns, multiple directions w/o turns  Exercise 3: [place] alt taps, crossing midline, with boom wackers  Exercise 5: Gait drills: cross crawl marching x2 laps in // bars, march with opp UE raise - modified to sitting with TC, difficult, tandem, retro with coiunting by 5's F/R, dual tasking  Exercise 12: 4-way hip YTB x15 B/L  Exercise 13: Obstacle course: over hurdles, foam mat over bean bags, on/off airex with and w/o carrying ball on cone    Stairs  # Steps : 12  Stairs Height: 6\"  Rails: Right ascending  Device: No Device  Assistance: Independent  Comment: recip, VCs to look up, reverting to NR       *Indicates exercise, modality, or manual techniques to be initiated when appropriate    Assessment: Body structures, Functions, Activity limitations: Decreased functional mobility , Decreased strength, Decreased balance, Increased pain, Decreased ROM  Assessment: Pt continues to be challenged with cooridination and dual tasking,ie: alt le/ue, however able to perform cross crawls with VC s today. Multiple dual tasking activites with self corrected balance when crossing midline.  Pt states she is bone on bone vera hips and will be having Rt hip replacement Feb 6, pending clearance. Treatment Diagnosis: Abnormality of gait  Prognosis: Good       Goals:       Long term goals  Time Frame for Long term goals : 4 weeks  Long term goal 1: Improve vera LE strength to >/= 4+/5 to improve stability with standing and walking. Long term goal 2: Pt will be able to ambulate >/= 250' with minimal to no deviations S/I. Long term goal 3: FGA >/= 27/30 to reduce pt's risk for falls. Long term goal 4: Pt will be able to ambulate with dual tasking with minimal to no deviations. Progress toward goals:dual tasking, cooridination, balance    POST-PAIN       Pain Rating (0-10 pain scale):  5/10   Location and pain description same as pre-treatment unless indicated. Action: [] NA   [] Perform HEP  [x] Meds as prescribed  [x] Modalities as prescribed   [] Call Physician     Frequency/Duration:  Plan  Times per week: 1-2  Plan weeks: 4  Current Treatment Recommendations: Strengthening, ROM, Balance Training, Functional Mobility Training, Transfer Training, Gait Training, Stair training, Neuromuscular Re-education, Manual Therapy - Soft Tissue Mobilization, Home Exercise Program, Safety Education & Training, Patient/Caregiver Education & Training, Equipment Evaluation, Education, & procurement, Modalities  Plan Comment: Pt agreeable to 1x/week     Pt to continue current HEP. See objective section for any therapeutic exercise changes, additions or modifications this date.          PT Individual Minutes  Time In: 0326  Time Out: 9397  Minutes: 53  Timed Code Treatment Minutes: 53 Minutes  Procedure Minutes: 0     Timed Activity Minutes Units   Ther Ex 10 1   Neuro 43 3       Signature:  Electronically signed by Chick Phoenix, PTA on 1/14/20 at 5:11 PM

## 2020-01-16 ENCOUNTER — APPOINTMENT (OUTPATIENT)
Dept: PHYSICAL THERAPY | Age: 80
End: 2020-01-16
Payer: MEDICARE

## 2020-01-16 ENCOUNTER — HOSPITAL ENCOUNTER (OUTPATIENT)
Dept: SPEECH THERAPY | Age: 80
Setting detail: THERAPIES SERIES
Discharge: HOME | End: 2020-01-16
Payer: MEDICARE

## 2020-01-16 ENCOUNTER — HOSPITAL ENCOUNTER (OUTPATIENT)
Dept: OCCUPATIONAL THERAPY | Age: 80
Setting detail: THERAPIES SERIES
Discharge: HOME | End: 2020-01-16
Payer: MEDICARE

## 2020-01-16 PROCEDURE — 92507 TX SP LANG VOICE COMM INDIV: CPT

## 2020-01-16 PROCEDURE — 97130 THER IVNTJ EA ADDL 15 MIN: CPT

## 2020-01-16 PROCEDURE — 97129 THER IVNTJ 1ST 15 MIN: CPT

## 2020-01-16 PROCEDURE — 97530 THERAPEUTIC ACTIVITIES: CPT

## 2020-01-16 NOTE — PROGRESS NOTES
[facility] Outpatient  Speech Language Pathology  Adult Daily Note    Peace Aguila  1940  1/16/2020    Visit Information:  SLP Insurance Information: Medicare  Total # of Visits to Date: 12  No Show: 0  Canceled Appointment: 2      Plan of care signed (Y/N):     signed    Progress Note Next due:   Certification Period: 1/01/2020-1/31/2020  Interventions used this date:  Cognitive Skill Development    Subjective: The patient was motivated. Behavior:  Alert, cooperative. Objective/Assessment:   Long Term Goals:  Goal 1: Pt will improve her cognition to a functional level for adequate functional recall and safety awareness for completion of ADL's and participation in home and community settings  Long Term Goals:     Short Term Goals:  Pt to be seen 1-2x/wk for 6-8 wks. Short-term Goals  Goal 1: To promote awareness and functionality of compensatory strategies in light of pt's cognitive deficits, pt will recall 3/3 memory strategies with min cues and utilize them in  structured tasks in 80% of opportunities with min cues. Patient found her way to therapy this date by remembering landmarks. Goal 2: To increase safety awareness and judgment for safe completion of ADLs secondary to pt's cognitive deficits, pt will complete abstract reasoning tasks (i.e. Word deduction, divergent naming, similarities/differences) with 80% accuracy and min cues. Word deduction/reasoning 14/16 or 88%. Unscramble a word, with a word provided and adding a letter produce a word:18/19 correct or:95%. Goal 3: To increase safety awareness and judgment for safe completion of ADLs secondary to pt's cognitive deficits, pt will provide reasonable solutions to problems of everyday living with 80% accuracy and min cues. Not addressed. Goal 4:  To address pt's cognitive deficits and promote his/her ability to safely follow directives in a variety of environments, pt will carry out (verbal/written) directives of increasing complexity
Normal/bedrest/wheelchair (0 points)  Mental Status:    Oriented to own ability (0 points)  Total points = 15  Fall Risk Level: low  0 - 24: Low Risk - implement low risk fall prevention interventions    25 - 44: Medium risk  45 and higher: High Risk    LAURA NOMS: Initial  NOMS in hard chart. Electronically signed by:  Electronically signed by ENRIKE Penaloza on 1/16/2020 at 5:31 PM      If you have any questions or concerns, please don't hesitate to call.   Thank you for your referral.      Physician Signature:________________________________Date:__________________  By signing above, therapists plan is approved by physician

## 2020-01-16 NOTE — PROGRESS NOTES
Occupational Therapy  Daily Note     Name: Stella Melo  : 1940  MRN: [id number]       Visit Information:        Date: 2020  Time:    OT Therapeutic activities 60 minutes for 4 unit(s)       OT Individual Minutes  Time In: 1400  Time Out: 1500  Minutes: 60                   Subjective:  Pt seen with son present. Pain rating:     Pre-treatment pain:8/10       Pain after treatment:  6/10             Focus of treatment was on the following:   bilateral coordination , fine motor/dexterity, strengthening bilaterally  UE  and strengthening  bilaterally       Modality:   none    Parameters:       Manual:      MFR:     Treatment Activity: Pt completed acts at table top to increase UE function. Pt completed placing pop beads on 100 hole pegboard with use of green graded clothespin and removal by hand. Pt completed purdue pegboard to increase [facility]. Pt bilateral Shoulder strength tested. Bilateral shoulder strength 4/5. Pt completed draw the clock and placing a time on it. Pt noted to draw clock face correctly and place time at 2:30. [place] #1 completed while standing. Pt tolerated TX well. Pt has achieved goals/highest potential at this time. Exercises:       Comments: Pt and patient's son informed of D/C. Pt instructed to continue exercises at home upon d/c with fair+ understanding noted. Assessment:   Pt tolerated treatment well.      Plan:   D/C from OP OT    Collaborated with OTR:  N/A    Goals:       LAKHWINDER Reyes/L     3:30 PM Electronically signed by LAKHWINDER Pro/L on 46 at 3:30 PM

## 2020-01-21 ENCOUNTER — APPOINTMENT (OUTPATIENT)
Dept: OCCUPATIONAL THERAPY | Age: 80
End: 2020-01-21
Payer: MEDICARE

## 2020-01-21 ENCOUNTER — OFFICE VISIT (OUTPATIENT)
Dept: PULMONOLOGY | Age: 80
End: 2020-01-21
Payer: MEDICARE

## 2020-01-21 ENCOUNTER — HOSPITAL ENCOUNTER (OUTPATIENT)
Dept: PHYSICAL THERAPY | Age: 80
Setting detail: THERAPIES SERIES
End: 2020-01-21
Payer: MEDICARE

## 2020-01-21 ENCOUNTER — HOSPITAL ENCOUNTER (OUTPATIENT)
Dept: SPEECH THERAPY | Age: 80
Setting detail: THERAPIES SERIES
Discharge: HOME | End: 2020-01-21
Payer: MEDICARE

## 2020-01-21 VITALS
SYSTOLIC BLOOD PRESSURE: 120 MMHG | WEIGHT: 149 LBS | OXYGEN SATURATION: 91 % | BODY MASS INDEX: 28.13 KG/M2 | TEMPERATURE: 98.2 F | DIASTOLIC BLOOD PRESSURE: 74 MMHG | RESPIRATION RATE: 16 BRPM | HEIGHT: 61 IN | HEART RATE: 90 BPM

## 2020-01-21 PROBLEM — Z72.0 TOBACCO ABUSE: Chronic | Status: ACTIVE | Noted: 2019-05-06

## 2020-01-21 PROBLEM — Z01.811 PREOPERATIVE RESPIRATORY EXAMINATION: Status: ACTIVE | Noted: 2020-01-21

## 2020-01-21 PROBLEM — R09.02 HYPOXIA: Status: ACTIVE | Noted: 2020-01-21

## 2020-01-21 PROCEDURE — 97129 THER IVNTJ 1ST 15 MIN: CPT

## 2020-01-21 PROCEDURE — 1123F ACP DISCUSS/DSCN MKR DOCD: CPT | Performed by: INTERNAL MEDICINE

## 2020-01-21 PROCEDURE — 4040F PNEUMOC VAC/ADMIN/RCVD: CPT | Performed by: INTERNAL MEDICINE

## 2020-01-21 PROCEDURE — 1090F PRES/ABSN URINE INCON ASSESS: CPT | Performed by: INTERNAL MEDICINE

## 2020-01-21 PROCEDURE — 3023F SPIROM DOC REV: CPT | Performed by: INTERNAL MEDICINE

## 2020-01-21 PROCEDURE — 1036F TOBACCO NON-USER: CPT | Performed by: INTERNAL MEDICINE

## 2020-01-21 PROCEDURE — G8399 PT W/DXA RESULTS DOCUMENT: HCPCS | Performed by: INTERNAL MEDICINE

## 2020-01-21 PROCEDURE — G8482 FLU IMMUNIZE ORDER/ADMIN: HCPCS | Performed by: INTERNAL MEDICINE

## 2020-01-21 PROCEDURE — G8427 DOCREV CUR MEDS BY ELIG CLIN: HCPCS | Performed by: INTERNAL MEDICINE

## 2020-01-21 PROCEDURE — G8417 CALC BMI ABV UP PARAM F/U: HCPCS | Performed by: INTERNAL MEDICINE

## 2020-01-21 PROCEDURE — G8926 SPIRO NO PERF OR DOC: HCPCS | Performed by: INTERNAL MEDICINE

## 2020-01-21 PROCEDURE — 97130 THER IVNTJ EA ADDL 15 MIN: CPT

## 2020-01-21 PROCEDURE — 99205 OFFICE O/P NEW HI 60 MIN: CPT | Performed by: INTERNAL MEDICINE

## 2020-01-21 ASSESSMENT — ENCOUNTER SYMPTOMS
NAUSEA: 0
EYE DISCHARGE: 0
CHEST TIGHTNESS: 0
DIARRHEA: 0
SORE THROAT: 0
COUGH: 0
VOMITING: 0
ABDOMINAL PAIN: 0
VOICE CHANGE: 0
TROUBLE SWALLOWING: 0
EYE ITCHING: 0
WHEEZING: 0
RHINORRHEA: 0
SINUS PRESSURE: 0
SHORTNESS OF BREATH: 1

## 2020-01-21 NOTE — PROGRESS NOTES
environments, pt will carry out (verbal/written) directives of increasing complexity in everyday activities with 80% accuracy and min cues. Written directives:3/4 correct. Goal 5: Patient's STM to be tested with the RIPA II  The results of the RIPA II were explained. Goal met. Goal 6. Patient will participate in ongoing assessment of cognitive-linguistic skills, with additional goals to be established as appropriate. Goal 7. Patient to complete sequencing tasks from 33% to 80%. Not addressed. 8. Patient to complete  STM tasks from~60-90%. Auditory memory of 3 items from a paragraph: 1/3, 2/3, 1/3, 1/3: 41%. Patient remembered path to therapy this date. Pain Assessment:  Patient demonstrated no s/s of pain. Plan:  Continue with current goals    Patient/Caregiver Education:  Patient/Caregiver educated on session. Time in: 1400  Time out:1500  Minutes seen:60.   Electronically signed by ENRIKE Marquez on 1/21/2020 at 5:27 PM

## 2020-01-21 NOTE — PROGRESS NOTES
Subjective: Peace Aguila is a 70 y.o. female who complains today of:     Chief Complaint   Patient presents with   Esther Michael Doctor     referred by Dr. Cathy Robles for COPD. HPI  Patient was sent for evaluation of COPD. C/o shortness of breath  with exertion , worse with cold weather. Feels fatigue with exertion. Occasional Wheezing   Occasional C/o Cough with clear  Sputum  No Hemoptysis  No Chest tightness   No Chest pain with radiation  or pleuritic pain  No Fever or chills. Occasional Rhinorrhea , no postnasal drip. She is on Flonase nasal spray. Using bronchodilator with Albuterol HFA prn bases    She was smoking 1 ppd. She quit 1 year ago     Allergies:  Patient has no known allergies.   Past Medical History:   Diagnosis Date    COPD [facility]chronic obstructive pulmonary disease) ([facility] [address]     Hypertension     Osteoarthritis     Tobacco use disorder 2019     Past Surgical History:   Procedure Laterality Date    EYE SURGERY      PER PROBLEM SHEET    HYSTERECTOMY  1975    PER PROBLEM SHEET    LITHOTRIPSY  2004    PER PROBLEM SHEET    VEIN SURGERY  1988    PER PROBLEM SHEET     Family History   Problem Relation Age of Onset    Diabetes Other         GRANDMOTHER     Social History     Socioeconomic History    Marital status:      Spouse name: Not on file    Number of children: Not on file    Years of education: Not on file    Highest education level: Not on file   Occupational History    Not on file   Social Needs    Financial resource strain: Not on file    Food insecurity:     Worry: Never true     Inability: Not on file    Transportation needs:     Medical: No     Non-medical: No   Tobacco Use    Smoking status: Former Smoker     Packs/day: 0.75     Years: 45.00     Pack years: 33.75     Types: Cigarettes     Start date:      Last attempt to quit: 2019     Years since quittin.9    Smokeless tobacco: Never Used   Substance and Sexual Activity    Alcohol use: No     Comment: social    Drug use: No    Sexual activity: Not on file   Lifestyle    Physical activity:     Days per week: 0 days     Minutes per session: 0 min    Stress: Only a little   Relationships    Social connections:     Talks on phone: More than three times a week     Gets together: More than three times a week     Attends Baptism service: More than 4 times per year     Active member of club or organization: No     Attends meetings of clubs or organizations: Never     Relationship status:     Intimate partner violence:     Fear of current or ex partner: No     Emotionally abused: No     Physically abused: No     Forced sexual activity: No   Other Topics Concern    Not on file   Social History Narrative         Lives With: Alone son is helpful and is her POA for health care    Type of Home: House    Home Layout: One level    Home Access: Stairs to enter with rails    Entrance Stairs - Number of Steps: 2    Bathroom Shower/Tub: Tub/Shower unit, Walk-in shower    Bathroom Equipment: Hand-held shower    ADL Assistance: Independent    Homemaking Assistance: Independent    Ambulation Assistance: Independent(No AD)    Transfer Assistance: Independent    Active : Yes    Additional Comments: Pt. reports she has fallen at home but not recently and not regularly. Pt. having word finding difficulty, but sons are present and clarify pt's answers         Review of Systems   Constitutional: Positive for fatigue. Negative for chills, diaphoresis and fever. HENT: Negative for congestion, mouth sores, nosebleeds, postnasal drip, rhinorrhea, sinus pressure, sneezing, sore throat, trouble swallowing and voice change. Eyes: Negative for discharge, itching and visual disturbance. Respiratory: Positive for shortness of breath. Negative for cough, chest tightness and wheezing. Cardiovascular: Negative for chest pain, palpitations and leg swelling.    Gastrointestinal: Negative for abdominal pain, diarrhea, nausea and vomiting. Genitourinary: Negative for difficulty urinating and hematuria. Musculoskeletal: Negative for arthralgias, joint swelling and myalgias. Skin: Negative for rash. Allergic/Immunologic: Negative for environmental allergies and food allergies. Neurological: Negative for dizziness, tremors, weakness and headaches. Psychiatric/Behavioral: Negative for behavioral problems and sleep disturbance.         :     Vitals:    01/21/20 0919   BP: 120/74   Pulse: 90   Resp: 16   Temp: 98.2 °F (36.8 °C)   TempSrc: Tympanic   SpO2: 91%   Weight: 149 lb (67.6 kg)   Height: 5' 1\" (1.549 m)     Wt Readings from Last 3 Encounters:   01/21/20 149 lb (67.6 kg)   01/08/20 153 lb (69.4 kg)   12/24/19 156 lb 3.2 oz (70.9 kg)         Physical Exam  Constitutional:       General: She is not in acute distress. Appearance: She is well-developed. She is not diaphoretic. HENT:      Head: Normocephalic and atraumatic. Nose: Nose normal.   Eyes:      Pupils: Pupils are equal, round, and reactive to light. Neck:      Thyroid: No thyromegaly. Vascular: No JVD. Trachea: No tracheal deviation. Cardiovascular:      Rate and Rhythm: Normal rate and regular rhythm. Heart sounds: No murmur. No friction rub. No gallop. Pulmonary:      Effort: No respiratory distress. Breath sounds: No wheezing or rales. Comments: diminished Breath sound bilaterally. Chest:      Chest wall: No tenderness. Abdominal:      General: There is no distension. Tenderness: There is no tenderness. There is no rebound. Musculoskeletal: Normal range of motion. Lymphadenopathy:      Cervical: No cervical adenopathy. Skin:     General: Skin is warm and dry. Neurological:      Mental Status: She is alert and oriented to person, place, and time.       Coordination: Coordination normal.         Current Outpatient Medications   Medication Sig Dispense Refill    surgery for rt. Hip replacement with moderate risk . She is advise t use nebulizer with Duoneb TID or QID post ope period    4. Tobacco abuse quit 1 year ago   Patient advised to stay away smoking. Risks related to smoking explained to the patient. She quit 1 year ago. Return in about 2 months (around 3/21/2020) for COPD, hypoxia on O2.       Guera Severino MD

## 2020-01-23 ENCOUNTER — HOSPITAL ENCOUNTER (OUTPATIENT)
Dept: PHYSICAL THERAPY | Age: 80
Setting detail: THERAPIES SERIES
Discharge: HOME | End: 2020-01-23
Payer: MEDICARE

## 2020-01-23 ENCOUNTER — HOSPITAL ENCOUNTER (OUTPATIENT)
Dept: SPEECH THERAPY | Age: 80
Setting detail: THERAPIES SERIES
Discharge: HOME | End: 2020-01-23
Payer: MEDICARE

## 2020-01-23 ENCOUNTER — APPOINTMENT (OUTPATIENT)
Dept: PHYSICAL THERAPY | Age: 80
End: 2020-01-23
Payer: MEDICARE

## 2020-01-23 ENCOUNTER — APPOINTMENT (OUTPATIENT)
Dept: OCCUPATIONAL THERAPY | Age: 80
End: 2020-01-23
Payer: MEDICARE

## 2020-01-23 PROCEDURE — 97112 NEUROMUSCULAR REEDUCATION: CPT

## 2020-01-23 PROCEDURE — 97129 THER IVNTJ 1ST 15 MIN: CPT

## 2020-01-23 PROCEDURE — 97110 THERAPEUTIC EXERCISES: CPT

## 2020-01-23 PROCEDURE — 97130 THER IVNTJ EA ADDL 15 MIN: CPT

## 2020-01-23 ASSESSMENT — PAIN DESCRIPTION - ORIENTATION: ORIENTATION: RIGHT

## 2020-01-23 ASSESSMENT — PAIN DESCRIPTION - LOCATION: LOCATION: HIP

## 2020-01-23 ASSESSMENT — PAIN SCALES - GENERAL: PAINLEVEL_OUTOF10: 6

## 2020-01-23 ASSESSMENT — PAIN DESCRIPTION - DESCRIPTORS: DESCRIPTORS: CONSTANT;ACHING

## 2020-01-23 ASSESSMENT — PAIN DESCRIPTION - PAIN TYPE: TYPE: CHRONIC PAIN

## 2020-01-23 NOTE — PROGRESS NOTES
complexity in everyday activities with 80% accuracy and min cues. Written directives:10/12 or 83%  Goal 5: Patient's STM to be tested with the RIPA II  The results of the RIPA II were explained. Goal met. Goal 6. Patient will participate in ongoing assessment of cognitive-linguistic skills, with additional goals to be established as appropriate. Goal 7. Patient to complete sequencing tasks from 33% to 80%. Verbally:6/6  8. Patient to complete  STM tasks from~60-90%. Patient completed a task using grouping to remember 12 items:8/12 correct or 67%    Pain Assessment:  Pain at 7-8 in right leg    Plan:  Continue with current goals    Patient/Caregiver Education:  Patient/Caregiver educated on session. Time in: 1600  Time out:1700  Minutes seen:60.   Electronically signed by ENRIKE Trejo on 1/23/2020 at 5:22 PM

## 2020-01-28 ENCOUNTER — HOSPITAL ENCOUNTER (OUTPATIENT)
Dept: PHYSICAL THERAPY | Age: 80
Setting detail: THERAPIES SERIES
Discharge: HOME | End: 2020-01-28
Payer: MEDICARE

## 2020-01-28 ENCOUNTER — HOSPITAL ENCOUNTER (OUTPATIENT)
Dept: SPEECH THERAPY | Age: 80
Setting detail: THERAPIES SERIES
Discharge: HOME | End: 2020-01-28
Payer: MEDICARE

## 2020-01-28 ENCOUNTER — APPOINTMENT (OUTPATIENT)
Dept: OCCUPATIONAL THERAPY | Age: 80
End: 2020-01-28
Payer: MEDICARE

## 2020-01-28 ENCOUNTER — TELEPHONE (OUTPATIENT)
Dept: NEUROLOGY | Age: 80
End: 2020-01-28

## 2020-01-28 PROCEDURE — 97130 THER IVNTJ EA ADDL 15 MIN: CPT

## 2020-01-28 PROCEDURE — 97112 NEUROMUSCULAR REEDUCATION: CPT

## 2020-01-28 PROCEDURE — 97116 GAIT TRAINING THERAPY: CPT

## 2020-01-28 PROCEDURE — 97110 THERAPEUTIC EXERCISES: CPT

## 2020-01-28 PROCEDURE — 97129 THER IVNTJ 1ST 15 MIN: CPT

## 2020-01-28 ASSESSMENT — PAIN DESCRIPTION - DESCRIPTORS: DESCRIPTORS: CONSTANT;ACHING

## 2020-01-28 ASSESSMENT — PAIN DESCRIPTION - LOCATION: LOCATION: HIP

## 2020-01-28 ASSESSMENT — PAIN DESCRIPTION - FREQUENCY: FREQUENCY: INTERMITTENT

## 2020-01-28 ASSESSMENT — PAIN SCALES - GENERAL: PAINLEVEL_OUTOF10: 5

## 2020-01-28 ASSESSMENT — PAIN DESCRIPTION - PAIN TYPE: TYPE: CHRONIC PAIN

## 2020-01-28 ASSESSMENT — PAIN DESCRIPTION - ORIENTATION: ORIENTATION: RIGHT

## 2020-01-28 NOTE — PROGRESS NOTES
[address]     Ph: [phone]  Fax: [phone]    [] Certification  [] Recertification []  Plan of Care  [] Progress Note [x] Discharge      To:  Dr. Kerry Mcmullen      From: Dwaynerickie Oh PT, DPTPatient: Niki Flores     : 1940  Diagnosis: Ataxic gait     Date: 2020  Treatment Diagnosis: Abnormality of gait    Plan of Care/Certification Expiration Date: 20  Progress Report Period from:  12/3/20  to 2020    Total # of Visits to Date: 11   No Show: 0    Canceled Appointment: 2     OBJECTIVE:     Long Term Goals - Time Frame for Long term goals : 4 weeks  Goals Current/ Discharge status Met   Long term goal 1: Improve vera LE strength to >/= 4+/5 to improve stability with standing and walking. Strength RLE  Comment: Hip flex 4+/5, knee ext/flex 5/5, DF 5/5, hip abd 4-/5, hip ext 4-/5  Strength LLE  Comment: Hip flex 4+/5, knee ext/flex 5/5, DF 5/5, hip abd 4/5, hip ext 4-/5 [x] yes  [x] no   Long term goal 2: Pt will be able to ambulate >/= 250' with minimal to no deviations S/I. Ambulates > 250 independently with minimal gait deviations  [x] yes  [] no   Long term goal 3: FGA >/=  to reduce pt's risk for falls.  [x] yes  [] no   Long term goal 4: Pt will be able to ambulate with dual tasking with minimal to no deviations. No increase in gait deviations with dual tasking  [] yes  [x] no       Body structures, Functions, Activity limitations: Decreased functional mobility , Decreased strength, Decreased balance, Increased pain, Decreased ROM  Assessment: Patient request to D/C this date due to upcoming hip surgery. Ambulates independently > 200' Independently with minimal gait deviations. No change in gait with dual tasking this date. Pt will be D/C'd at this time to HEP.   Prognosis: Good      PLAN: [] Evaluate and Treat  Frequency/Duration:  D/C to HEP
with minimal gait deviations. No change in gait with dual tasking this date. Continues to have mild difficulty coordinating movement for march with opp arm reach. Treatment Diagnosis: Abnormality of gait  Prognosis: Good       Goals:  Long term goals  Time Frame for Long term goals : 4 weeks  Long term goal 1: Improve vera LE strength to >/= 4+/5 to improve stability with standing and walking. Long term goal 2: Pt will be able to ambulate >/= 250' with minimal to no deviations S/I. Long term goal 3: FGA >/= 27/30 to reduce pt's risk for falls. Long term goal 4: Pt will be able to ambulate with dual tasking with minimal to no deviations. Progress toward goals:continue towards all   POST-PAIN       Pain Rating (0-10 pain scale): 5  /10   Location and pain description same as pre-treatment unless indicated. Action: [] NA   [x] Perform HEP  [] Meds as prescribed  [] Modalities as prescribed   [] Call Physician     Frequency/Duration:  Plan  Times per week: 1-2  Plan weeks: 4  Current Treatment Recommendations: Strengthening, ROM, Balance Training, Functional Mobility Training, Transfer Training, Gait Training, Stair training, Neuromuscular Re-education, Manual Therapy - Soft Tissue Mobilization, Home Exercise Program, Safety Education & Training, Patient/Caregiver Education & Training, Equipment Evaluation, Education, & procurement, Modalities  Plan Comment: D/C this date      Pt to continue current HEP. See objective section for any therapeutic exercise changes, additions or modifications this date.          PT Individual Minutes  Time In: 1500  Time Out: 1600  Minutes: 60  Timed Code Treatment Minutes: 60 Minutes  Procedure Minutes:0     Timed Activity Minutes Units   Ther Ex 45 3   Gait  15 1       Signature:  Electronically signed by Alvin Bowie PTA on 1/28/20 at 4:13 PM

## 2020-01-28 NOTE — PROGRESS NOTES
[facility] Outpatient  Speech Language Pathology  Adult Daily Note    Fernando Matson  1940  1/28/2020    Visit Information:  SLP Insurance Information: Medicare   Total # of Visits Approved: (Medical necessity.)  Total # of Visits to Date: 15  No Show: 0  Canceled Appointment: 2    Plan of care signed (Y/N):     signed    Progress Note Next due:   Certification Period: 1/01/2020-1/31/2020  Interventions used this date:  Cognitive Skill Development    Subjective: The patient was motivated. Behavior:  Alert, cooperative. Objective/Assessment:   Long Term Goals:  Goal 1: Pt will improve her cognition to a functional level for adequate functional recall and safety awareness for completion of ADL's and participation in home and community settings  Long Term Goals:     Short Term Goals:  Pt to be seen 1-2x/wk for 6-8 wks. Short-term Goals  Goal 1: To promote awareness and functionality of compensatory strategies in light of pt's cognitive deficits, pt will recall 3/3 memory strategies with min cues and utilize them in  structured tasks in 80% of opportunities with min cues. Grouping, rehearsal, and writing things down were reviewed with the patient. Goal 2: To increase safety awareness and judgment for safe completion of ADLs secondary to pt's cognitive deficits, pt will complete abstract reasoning tasks (i.e. Word deduction, divergent naming, similarities/differences) with 80% accuracy and min cues. Analogies:15/23/ or 65%  Goal 3: To increase safety awareness and judgment for safe completion of ADLs secondary to pt's cognitive deficits, pt will provide reasonable solutions to problems of everyday living with 80% accuracy and min cues. Not addressed. Goal 4:  To address pt's cognitive deficits and promote his/her ability to safely follow directives in a variety of environments, pt will carry out (verbal/written) directives of increasing complexity in everyday activities with 80% accuracy and min cues.  Not addressed. Goal 5: Patient's STM to be tested with the RIPA II  The results of the RIPA II were explained. Goal met. Goal 6. Patient will participate in ongoing assessment of cognitive-linguistic skills, with additional goals to be established as appropriate. Goal 7. Patient to complete sequencing tasks from 33% to 80%. Written:7/7  8. Patient to complete  STM tasks from~60-90%. Patient completed a task using grouping and rehearsal  to remember 12 items:12/12 correct or 100%    Pain Assessment:  Pain at 7-8 in right leg    Plan:  Continue with current goals    Patient/Caregiver Education:  Patient/Caregiver educated on session. Time in: 1400  Time out:1500  Minutes seen:60.   Electronically signed by ENRIKE Fallon on 1/28/2020 at 4:43 PM

## 2020-01-29 ENCOUNTER — CLINICAL DOCUMENTATION (OUTPATIENT)
Dept: OCCUPATIONAL THERAPY | Age: 80
End: 2020-01-29

## 2020-01-29 NOTE — PROGRESS NOTES
is using weekly pill minder for sorting. [x] Met  [] Partially Met  [] Not Met   Long term goals 6: Pt will draw a clock without errors with correct time as stated by therapist. Per another OTR on 1/16/2019, pt correctly michelle clock. [x] Met  [] Partially Met  [] Not Met     Plan: D/C from OT    Thank you for referral of this patient. Electronically signed by:   MELISSA Benites 1/29/2020 10:11 AM

## 2020-01-30 ENCOUNTER — APPOINTMENT (OUTPATIENT)
Dept: OCCUPATIONAL THERAPY | Age: 80
End: 2020-01-30
Payer: MEDICARE

## 2020-01-30 ENCOUNTER — HOSPITAL ENCOUNTER (OUTPATIENT)
Dept: SPEECH THERAPY | Age: 80
Setting detail: THERAPIES SERIES
Discharge: HOME | End: 2020-01-30
Payer: MEDICARE

## 2020-01-30 ENCOUNTER — APPOINTMENT (OUTPATIENT)
Dept: PHYSICAL THERAPY | Age: 80
End: 2020-01-30
Payer: MEDICARE

## 2020-01-30 PROCEDURE — 97130 THER IVNTJ EA ADDL 15 MIN: CPT

## 2020-01-30 PROCEDURE — 97129 THER IVNTJ 1ST 15 MIN: CPT

## 2020-01-30 NOTE — PROGRESS NOTES
everyday activities with 80% accuracy and min cues. Not addressed. Goal 5: Patient's STM to be tested with the RIPA II  The results of the RIPA II were explained. Goal met. Goal 6. Patient will participate in ongoing assessment of cognitive-linguistic skills, with additional goals to be established as appropriate. Goal 7. Patient to complete sequencing tasks from 33% to 80%. Not addressed. 8. Patient to complete  STM tasks from~60-90%. Patient completed visual memory tasks 6/10, 13/14, 8/13. Patient was reminded before completing each task to group items together, which she did not do. Pain Assessment:  Pain at 7-8 in right leg    Plan:  Discharge patient. Patient/Caregiver Education:  Patient/Caregiver educated on session. Time in: 1400  Time out:1500  Minutes seen:60.    Electronically signed by ENRIKE Marquez on 1/30/2020 at 6:09 PM

## 2020-02-04 ENCOUNTER — APPOINTMENT (OUTPATIENT)
Dept: OCCUPATIONAL THERAPY | Age: 80
End: 2020-02-04
Payer: MEDICARE

## 2020-02-04 ENCOUNTER — APPOINTMENT (OUTPATIENT)
Dept: SPEECH THERAPY | Age: 80
End: 2020-02-04
Payer: MEDICARE

## 2020-02-04 ENCOUNTER — APPOINTMENT (OUTPATIENT)
Dept: PHYSICAL THERAPY | Age: 80
End: 2020-02-04
Payer: MEDICARE

## 2020-02-05 RX ORDER — ATORVASTATIN CALCIUM 20 MG/1
20 TABLET, FILM COATED ORAL DAILY
Qty: 90 TABLET | Refills: 4 | Status: SHIPPED | OUTPATIENT
Start: 2020-02-05 | End: 2020-01-01 | Stop reason: SDUPTHER

## 2020-02-05 RX ORDER — AMLODIPINE BESYLATE 5 MG/1
TABLET ORAL
Qty: 90 TABLET | Refills: 4 | Status: SHIPPED | OUTPATIENT
Start: 2020-02-05 | End: 2020-01-01 | Stop reason: SDUPTHER

## 2020-02-06 ENCOUNTER — APPOINTMENT (OUTPATIENT)
Dept: PHYSICAL THERAPY | Age: 80
End: 2020-02-06
Payer: MEDICARE

## 2020-02-06 ENCOUNTER — APPOINTMENT (OUTPATIENT)
Dept: OCCUPATIONAL THERAPY | Age: 80
End: 2020-02-06
Payer: MEDICARE

## 2020-02-06 ENCOUNTER — APPOINTMENT (OUTPATIENT)
Dept: SPEECH THERAPY | Age: 80
End: 2020-02-06
Payer: MEDICARE

## 2020-02-06 NOTE — PROGRESS NOTES
achieved. Partially achieved. Goal 4: To address pt's cognitive deficits and promote his/her ability to safely follow directives in a variety of environments, pt will carry out (verbal/written) directives of increasing complexity in everyday activities with 80% accuracy and min cues. Patient achieved 80% average: Goal met. Goal 5: Patient's STM to be tested with the RIPA II  The results of the RIPA II were explained. Goal met. Goal 6. Patient will participate in ongoing assessment of cognitive-linguistic skills, with additional goals to be established as appropriate. Goal 7. Patient to complete sequencing tasks from 33% to 80%. 86% average. Goal met. 8. Patient to complete  STM tasks from~60-90%. Patient completed STM tasks to 79%. Partially achieved.     ACHIEVEMENT OF GOALS:  Achieved goals: 1, 2, 4, 5, and 7 and Made progress towards goals:3,8.    REASON FOR DISCHARGE: Speech therapy is no longer deemed necessary at this time    DISCHARGE EDUCATION/RECOMMENDATIONS: Continue home exercise program as directed    Discharge NOMS:   Initial: CK  Discharge:CI  Electronically signed by ENRIKE Davis on 2/6/2020 at 4:19 PM

## 2020-02-11 ENCOUNTER — OFFICE VISIT (OUTPATIENT)
Dept: FAMILY MEDICINE CLINIC | Age: 80
End: 2020-02-11
Payer: MEDICARE

## 2020-02-11 ENCOUNTER — APPOINTMENT (OUTPATIENT)
Dept: PHYSICAL THERAPY | Age: 80
End: 2020-02-11
Payer: MEDICARE

## 2020-02-11 ENCOUNTER — APPOINTMENT (OUTPATIENT)
Dept: OCCUPATIONAL THERAPY | Age: 80
End: 2020-02-11
Payer: MEDICARE

## 2020-02-11 ENCOUNTER — APPOINTMENT (OUTPATIENT)
Dept: SPEECH THERAPY | Age: 80
End: 2020-02-11
Payer: MEDICARE

## 2020-02-11 VITALS
RESPIRATION RATE: 16 BRPM | HEART RATE: 88 BPM | SYSTOLIC BLOOD PRESSURE: 138 MMHG | TEMPERATURE: 97.5 F | WEIGHT: 150.3 LBS | HEIGHT: 61 IN | DIASTOLIC BLOOD PRESSURE: 88 MMHG | OXYGEN SATURATION: 98 % | BODY MASS INDEX: 28.37 KG/M2

## 2020-02-11 PROCEDURE — G8427 DOCREV CUR MEDS BY ELIG CLIN: HCPCS | Performed by: FAMILY MEDICINE

## 2020-02-11 PROCEDURE — 93000 ELECTROCARDIOGRAM COMPLETE: CPT | Performed by: FAMILY MEDICINE

## 2020-02-11 PROCEDURE — G8417 CALC BMI ABV UP PARAM F/U: HCPCS | Performed by: FAMILY MEDICINE

## 2020-02-11 PROCEDURE — 99213 OFFICE O/P EST LOW 20 MIN: CPT | Performed by: FAMILY MEDICINE

## 2020-02-11 PROCEDURE — G8482 FLU IMMUNIZE ORDER/ADMIN: HCPCS | Performed by: FAMILY MEDICINE

## 2020-02-11 PROCEDURE — 1090F PRES/ABSN URINE INCON ASSESS: CPT | Performed by: FAMILY MEDICINE

## 2020-02-11 PROCEDURE — G8510 SCR DEP NEG, NO PLAN REQD: HCPCS | Performed by: FAMILY MEDICINE

## 2020-02-11 ASSESSMENT — PATIENT HEALTH QUESTIONNAIRE - PHQ9
SUM OF ALL RESPONSES TO PHQ QUESTIONS 1-9: 0
SUM OF ALL RESPONSES TO PHQ QUESTIONS 1-9: 0
2. FEELING DOWN, DEPRESSED OR HOPELESS: 0
DEPRESSION UNABLE TO ASSESS: PT REFUSES
1. LITTLE INTEREST OR PLEASURE IN DOING THINGS: 0
SUM OF ALL RESPONSES TO PHQ9 QUESTIONS 1 & 2: 0

## 2020-02-11 NOTE — PROGRESS NOTES
Preoperative Consultation      Kailash Barclay  YOB: 1940    Date of Service:  2/11/2020    Vitals:    02/11/20 1805   BP: 138/88   Site: Left Upper Arm   Position: Sitting   Cuff Size: Medium Adult   Pulse: 88   Resp: 16   Temp: 97.5 °F (36.4 °C)   TempSrc: Tympanic   SpO2: 98%   Weight: 150 lb 4.8 oz (68.2 kg)   Height: 5' 1\" (1.549 m)      Wt Readings from Last 2 Encounters:   02/11/20 150 lb 4.8 oz (68.2 kg)   01/21/20 149 lb (67.6 kg)     BP Readings from Last 3 Encounters:   02/11/20 138/88   01/21/20 120/74   01/08/20 133/82        Chief Complaint   Patient presents with   [facility] Pre-op Exam     Patient present today for her Pre-Op Medical Clearence Exam.      No Known Allergies  Outpatient Medications Marked as Taking for the 2/11/20 encounter (Office Visit) with Mike Ortiz MD   Medication Sig Dispense Refill    amLODIPine (NORVASC) 5 MG tablet TAKE 1 TABLET BY MOUTH DAILY. 90 tablet 4    atorvastatin (LIPITOR) 20 MG tablet Take 1 tablet by mouth daily 90 tablet 4    umeclidinium-vilanterol (ANORO ELLIPTA) 62.5-25 MCG/INH AEPB inhaler Inhale 1 puff into the lungs daily 30 puff 3    OXYGEN Start oxygen therapy at 2 lit with activity and sleep , give POC for ambulation     Dx COPD 1 Units 0    albuterol sulfate  (90 Base) MCG/ACT inhaler Inhale 2 puffs into the lungs every morning (before breakfast) 1 Inhaler 3    aspirin 81 MG EC tablet Take 1 tablet by mouth daily 30 tablet 3    fluticasone (FLONASE) 50 MCG/ACT nasal spray 2 sprays by Nasal route daily 1 Bottle 5    alendronate (FOSAMAX) 70 MG tablet Take 1 tablet by mouth every 7 days 12 tablet 3    calcium carbonate 648 MG TABS Take 1 tablet by mouth 2 times daily 60 tablet 11    Multiple Vitamins-Minerals (PRESERVISION AREDS 2) CAPS Take 1 capsule by mouth daily 90 capsule 3    coenzyme Q10 100 MG CAPS capsule Take 100 mg by mouth daily      Ascorbic Acid (VITAMIN C) 500 MG tablet Take 500 mg by mouth daily.  Financial resource strain: Not on Mine insecurity:     Worry: Never true     Inability: Not on file    Transportation needs:     Medical: No     Non-medical: No   Tobacco Use    Smoking status: Former Smoker     Packs/day: 0.75     Years: 45.00     Pack years: 33.75     Types: Cigarettes     Start date:      Last attempt to quit: 2019     Years since quittin.0    Smokeless tobacco: Never Used   Substance and Sexual Activity    Alcohol use: No     Comment: social    Drug use: No    Sexual activity: Not on file   Lifestyle    Physical activity:     Days per week: 0 days     Minutes per session: 0 min    Stress: Only a little   Relationships    Social connections:     Talks on phone: More than three times a week     Gets together: More than three times a week     Attends Jewish service: More than 4 times per year     Active member of club or organization: No     Attends meetings of clubs or organizations: Never     Relationship status:     Intimate partner violence:     Fear of current or ex partner: No     Emotionally abused: No     Physically abused: No     Forced sexual activity: No   Other Topics Concern    Not on file   Social History Narrative         Lives With: Alone son is helpful and is her POA for health care    Type of Home: House    Home Layout: One level    Home Access: Stairs to enter with rails    Entrance Stairs - Number of Steps: 2    Bathroom Shower/Tub: Tub/Shower unit, Walk-in shower    Bathroom Equipment: Hand-held shower    ADL Assistance: Independent    Homemaking Assistance: Independent    Ambulation Assistance: Independent(No AD)    Transfer Assistance: Independent    Active : Yes    Additional Comments: Pt. reports she has fallen at home but not recently and not regularly.  Pt. having word finding difficulty, but sons are present and clarify pt's answers       Review of Systems  A comprehensive review of systems was negative except for what Please review colony count  and clinical indications to determine if a repeat culture is  necessary. No further workup to be done.       Color, UA 11/15/2019 Yellow     Clarity, UA 11/15/2019 Clear     Glucose, Ur 11/15/2019 Negative     Bilirubin Urine 11/15/2019 Negative     Ketones, Urine 11/15/2019 Negative     Specific Gravity, UA 11/15/2019 1.019     Blood, Urine 11/15/2019 Negative     pH, UA 11/15/2019 5.5     Protein, UA 11/15/2019 Negative     Urobilinogen, Urine 11/15/2019 0.2     Nitrite, Urine 11/15/2019 Negative     Leukocyte Esterase, Urine 11/15/2019 SMALL*    Bacteria, UA 11/15/2019 Negative     Hyaline Casts, UA 11/15/2019 1-3     WBC, UA 11/15/2019 20-50*    RBC, UA 11/15/2019 3-5*    Epi Cells 11/15/2019 0-2    Admission on 11/12/2019, Discharged on 11/14/2019   Component Date Value    Sodium 11/12/2019 142     Potassium 11/12/2019 4.0     Chloride 11/12/2019 106     CO2 11/12/2019 26     Anion Gap 11/12/2019 10     Glucose 11/12/2019 94     BUN 11/12/2019 19     CREATININE 11/12/2019 0.87     GFR Non- 11/12/2019 >60.0     GFR  11/12/2019 >60.0     Calcium 11/12/2019 9.7     Total Protein 11/12/2019 7.1     Alb 11/12/2019 4.0     Total Bilirubin 11/12/2019 0.3     Alkaline Phosphatase 11/12/2019 88     ALT 11/12/2019 10     AST 11/12/2019 16     Globulin 11/12/2019 3.1     WBC 11/12/2019 4.9     RBC 11/12/2019 4.44     Hemoglobin 11/12/2019 13.7     Hematocrit 11/12/2019 41.1     MCV 11/12/2019 92.6     MCH 11/12/2019 30.8     MCHC 11/12/2019 33.3     RDW 11/12/2019 14.0     Platelets 38/96/6345 273     Neutrophils % 11/12/2019 68.3     Lymphocytes % 11/12/2019 17.6     Monocytes % 11/12/2019 9.8     Eosinophils % 11/12/2019 3.4     Basophils % 11/12/2019 0.9     Neutrophils Absolute 11/12/2019 3.4     Lymphocytes Absolute 11/12/2019 0.9*    Monocytes Absolute 11/12/2019 0.5     Eosinophils Absolute 11/12/2019 0.2  Basophils Absolute 11/12/2019 0.0     Magnesium 11/12/2019 2.1     Troponin 11/12/2019 <0.010     aPTT 11/12/2019 33.6     Protime 11/12/2019 13.0     INR 11/12/2019 0.9     Ventricular Rate 11/12/2019 83     Atrial Rate 11/12/2019 83     P-R Interval 11/12/2019 136     QRS Duration 11/12/2019 70     Q-T Interval 11/12/2019 414     QTc Calculation (Bazett) 11/12/2019 486     P Axis 11/12/2019 64     R Axis 11/12/2019 -62     T Axis 11/12/2019 59     POC Glucose 11/12/2019 90     Performed on 11/12/2019 ACCU-CHEK     WBC 11/13/2019 4.0*    RBC 11/13/2019 4.29     Hemoglobin 11/13/2019 13.0     Hematocrit 11/13/2019 40.1     MCV 11/13/2019 93.4     MCH 11/13/2019 30.3     MCHC 11/13/2019 32.5*    RDW 11/13/2019 13.9     Platelets 54/56/6837 296     Sodium 11/13/2019 144     Potassium reflex Magnesi* 11/13/2019 3.6     Chloride 11/13/2019 107     CO2 11/13/2019 29     Anion Gap 11/13/2019 8*    Glucose 11/13/2019 89     BUN 11/13/2019 21     CREATININE 11/13/2019 0.66     GFR Non- 11/13/2019 >60.0     GFR  11/13/2019 >60.0     Calcium 11/13/2019 9.3     Color, UA 11/12/2019 Yellow     Clarity, UA 11/12/2019 Clear     Glucose, Ur 11/12/2019 Negative     Bilirubin Urine 11/12/2019 Negative     Ketones, Urine 11/12/2019 TRACE*    Specific [place], UA 11/12/2019 1.051     Blood, Urine 11/12/2019 Negative     pH, UA 11/12/2019 5.0     Protein, UA 11/12/2019 Negative     Urobilinogen, Urine 11/12/2019 0.2     Nitrite, Urine 11/12/2019 Negative     Leukocyte Esterase, Urine 11/12/2019 TRACE*    Urine Reflex to Culture 11/12/2019 YES     Left Ventricular Ejectio* 11/13/2019 60     LVEF MODALITY 11/13/2019 ECHO     Organism 11/12/2019 Escherichia coli*    Urine Culture, Routine 11/12/2019 <25,000 CFU/ml     Bacteria, UA 11/12/2019 Negative     Hyaline Casts, UA 11/12/2019 5-10     WBC, UA 11/12/2019 20-50*    RBC, UA 11/12/2019 3-5*  Epi Cells 11/12/2019 0-2     Homocysteine 11/13/2019 9.3     Vitamin B-12 11/13/2019 276     Folate 11/13/2019 16.8     Cholesterol, Total 11/14/2019 155     Triglycerides 11/14/2019 174*    HDL 11/14/2019 50     LDL Calculated 11/14/2019 70     Hemoglobin A1C 11/13/2019 6.0*    Cholesterol, Total 11/13/2019 185     Triglycerides 11/13/2019 189*    HDL 11/13/2019 51     LDL Calculated 11/13/2019 96            Assessment:       70 y.o. patient with planned surgery as above. Known risk factors for perioperative complications: COPD, Hypertension  Current medications which may produce withdrawal symptoms if withheld perioperatively: none      Plan:       Per Dr. Skip Rodriguez on 01/21/2020:    3. Preoperative respiratory examination  She had CXR show no active disease, PFT show moderately severe COPD , she has hypoxia with ambulation . she is moderate risk for post ope pulmonary complication. Patient is aware about risk , she is clear  For surgery for rt. Hip replacement with moderate risk . She is advise t use nebulizer with Duoneb TID or QID post ope period    Has been seen by Dr. Janay Sanchez for preop cardiac evaluation. She had stress test today. No results available. 1. Preoperative workup as follows: cardiac stress testing to exclude undiagnosed coronary disease (abnl EKG), ECG, hemoglobin, hematocrit, electrolytes, creatinine, glucose, liver function studies, urinalysis (urinary tract instrumentation planned)  2. Change in medication regimen before surgery: Discontinue ASA 3 days before surgery  3.  Prophylaxis for cardiac events with perioperative beta-blockers: Not indicated  ACC/AHA indications for pre-operative beta-blocker use:    · Vascular surgery with history of postitive stress test  · Intermediate or high risk surgery with history of CAD   · Intermediate or high risk surgery with multiple clinical predictors of CAD- 2 of the following: history of compensated or prior heart failure, history

## 2020-02-13 ENCOUNTER — APPOINTMENT (OUTPATIENT)
Dept: OCCUPATIONAL THERAPY | Age: 80
End: 2020-02-13
Payer: MEDICARE

## 2020-02-13 ENCOUNTER — APPOINTMENT (OUTPATIENT)
Dept: PHYSICAL THERAPY | Age: 80
End: 2020-02-13
Payer: MEDICARE

## 2020-02-13 ENCOUNTER — APPOINTMENT (OUTPATIENT)
Dept: SPEECH THERAPY | Age: 80
End: 2020-02-13
Payer: MEDICARE

## 2020-02-17 ENCOUNTER — TELEPHONE (OUTPATIENT)
Dept: PULMONOLOGY | Age: 80
End: 2020-02-17

## 2020-02-17 NOTE — TELEPHONE ENCOUNTER
Patient's son, Jocelin Finn, would like to speak to regarding her surgery for right hip replacement. He has a lot of pulmonary questions regarding this and would like to speak to you.  [phone]

## 2020-02-18 ENCOUNTER — APPOINTMENT (OUTPATIENT)
Dept: OCCUPATIONAL THERAPY | Age: 80
End: 2020-02-18
Payer: MEDICARE

## 2020-02-18 ENCOUNTER — APPOINTMENT (OUTPATIENT)
Dept: PHYSICAL THERAPY | Age: 80
End: 2020-02-18
Payer: MEDICARE

## 2020-02-18 ENCOUNTER — APPOINTMENT (OUTPATIENT)
Dept: SPEECH THERAPY | Age: 80
End: 2020-02-18
Payer: MEDICARE

## 2020-02-20 ENCOUNTER — APPOINTMENT (OUTPATIENT)
Dept: PHYSICAL THERAPY | Age: 80
End: 2020-02-20
Payer: MEDICARE

## 2020-02-20 ENCOUNTER — APPOINTMENT (OUTPATIENT)
Dept: OCCUPATIONAL THERAPY | Age: 80
End: 2020-02-20
Payer: MEDICARE

## 2020-02-20 ENCOUNTER — APPOINTMENT (OUTPATIENT)
Dept: SPEECH THERAPY | Age: 80
End: 2020-02-20
Payer: MEDICARE

## 2020-02-20 PROBLEM — Z01.811 PREOPERATIVE RESPIRATORY EXAMINATION: Status: RESOLVED | Noted: 2020-01-21 | Resolved: 2020-02-20

## 2020-02-25 ENCOUNTER — APPOINTMENT (OUTPATIENT)
Dept: PHYSICAL THERAPY | Age: 80
End: 2020-02-25
Payer: MEDICARE

## 2020-02-25 ENCOUNTER — APPOINTMENT (OUTPATIENT)
Dept: SPEECH THERAPY | Age: 80
End: 2020-02-25
Payer: MEDICARE

## 2020-02-25 ENCOUNTER — APPOINTMENT (OUTPATIENT)
Dept: OCCUPATIONAL THERAPY | Age: 80
End: 2020-02-25
Payer: MEDICARE

## 2020-02-27 ENCOUNTER — APPOINTMENT (OUTPATIENT)
Dept: SPEECH THERAPY | Age: 80
End: 2020-02-27
Payer: MEDICARE

## 2020-02-27 ENCOUNTER — APPOINTMENT (OUTPATIENT)
Dept: PHYSICAL THERAPY | Age: 80
End: 2020-02-27
Payer: MEDICARE

## 2020-02-27 ENCOUNTER — APPOINTMENT (OUTPATIENT)
Dept: OCCUPATIONAL THERAPY | Age: 80
End: 2020-02-27
Payer: MEDICARE

## 2020-03-10 ENCOUNTER — HOSPITAL ENCOUNTER (OUTPATIENT)
Dept: PHYSICAL THERAPY | Age: 80
Setting detail: THERAPIES SERIES
Discharge: HOME | End: 2020-03-10
Payer: MEDICARE

## 2020-03-10 PROCEDURE — 97162 PT EVAL MOD COMPLEX 30 MIN: CPT

## 2020-03-10 ASSESSMENT — PAIN DESCRIPTION - PAIN TYPE: TYPE: SURGICAL PAIN

## 2020-03-10 ASSESSMENT — PAIN DESCRIPTION - ORIENTATION: ORIENTATION: RIGHT

## 2020-03-10 ASSESSMENT — PAIN DESCRIPTION - DESCRIPTORS: DESCRIPTORS: DULL

## 2020-03-10 ASSESSMENT — PAIN SCALES - GENERAL: PAINLEVEL_OUTOF10: 4

## 2020-03-10 ASSESSMENT — PAIN DESCRIPTION - LOCATION: LOCATION: HIP;KNEE

## 2020-03-10 NOTE — PLAN OF CARE
uses cane on right side, narrow ROGELIO, decreased coordination  Gait Deviations: Slow Myriam, Decreased step length, Decreased step height  Distance: clinical distance in department  Comments: trialed gait training using cane in left hand but patient had difficulty sequencing dispite verbal cues    [] yes  [x] no   Long term goal 3: Patient will ascend/descend 4-6'' steps independently x 3 using HRs with non-reciprocal pattern. Stairs  # Steps : 4  Stairs Height: 6\"  Rails: Bilateral  Device: No Device  Assistance: Stand by assistance  Comment: non-reciprocal pattern, verbal cues for sequencing [] yes  [x] no   Long term goal 4: LEFS >/= 41/80 to demonstrate functional improvements. LEFS: 31/80 [] yes  [x] no       Body structures, Functions, Activity limitations: Decreased functional mobility , Decreased strength, Decreased endurance, Increased pain  Assessment: Patient s/p right THR on 2/20/20 and reports some pain down right LE on lateral side. Upon PT evaluation, patient demonstrates decreased right hip strength and decreased endurance. Verbal cues needed for gait and steps for improve sequencing. Further outpatient PT recommended to increase strength and improve mobility for overall quality of life.   Prognosis: Good  Discharge Recommendations: Continue to assess pending progress      PT Education: Goals;PT Role;Home Exercise Program;Plan of Care    PLAN: [x] Evaluate and Treat  Frequency/Duration:  Plan  Times per week: 2  Plan weeks: 6  Current Treatment Recommendations: Strengthening, ROM, Endurance Training, Gait Training, Stair training, Neuromuscular Re-education, Functional Mobility Training, Transfer Training, Manual Therapy - Soft Tissue Mobilization, Home Exercise Program, Safety Education & Training, Patient/Caregiver Education & Training, Equipment Evaluation, Education, & procurement, Modalities     Precautions:Restrictions/Precautions: [place] Bearing Right Lower Extremity Weight Bearing: Weight Bearing As Tolerated      Other position/activity restrictions: avoid excessive hip motions (avoid bending past 90 deg and twisting, avoid hip flexor stretching)                  Patient Status:[x] Continue/ Initiate plan of Care    [] Discharge PT. Recommend pt continue with HEP. [] Additional visits requested, Please re-certify for additional visits:          Signature: Electronically signed by Lelia Weiss PT on 3/10/20 at 4:06 PM EDT      If you have any questions or concerns, please don't hesitate to call. Thank you for your referral.    I have reviewed this plan of care and certify a need for medically necessary rehabilitation services.     Physician Signature:__________________________________________________________  Date:  Please sign and return

## 2020-03-10 NOTE — PROGRESS NOTES
[address]  PHYSICAL THERAPY EVALUATION    Date: 3/10/2020  Patient Name: Junior Day       MRN: [id number]   Account: [de-identified]   : 1940  (70 y.o.)   Gender: female   Referring Practitioner: Neeraj Watson MD                 Diagnosis: Right Total hip arthroplasty  Treatment Diagnosis: right hip pain, difficulty with ambulation, impaired gait     Restrictions/Precautions: Weight BearingRight Lower Extremity Weight Bearing: Weight Bearing As Tolerated    Other position/activity restrictions: avoid excessive hip motions (avoid bending past 90 deg and twisting, avoid hip flexor stretching)    Past Medical History:  has a past medical history of COPD (chronic obstructive pulmonary disease) ([address] Hypertension, Osteoarthritis, and Tobacco use disorder. Past Surgical History:   has a past surgical history that includes Hysterectomy (); Vein Surgery (); Lithotripsy (); and eye surgery (). Vital Signs  Patient Currently in Pain: Yes   Pain Screening  Patient Currently in Pain: Yes  Pain Assessment  Pain Assessment: 0-10  Pain Level: 4  Pain Type: Surgical pain  Pain Location: Hip;Knee  Pain Orientation: Right  Pain Descriptors: Dull                Type of Home: House  Home Layout: One level  Home Access: Stairs to enter with rails  Entrance Stairs - Number of Steps: 2-3  ADL Assistance: Independent  Ambulation Assistance: Independent(without AD prior to surgery)  Transfer Assistance: Independent  Additional Comments: no hx of falls        Subjective:  Subjective: Patient s/p right JAME on 20 and was D/C home with home PT. Patient had the anterior surgical approach. Prior to surgery patient was ambulating witout AD. Denies any numbness or tingling. Patient currently ambulates with cane.   Comments: RTD 3/18/20    Objective:   Sensation  Overall Sensation Status: WFL          Ambulation 1  Device: Single point cane  Assistance: Supervision, Stand by progression  Modalities:  Modalities  Cryotherapy (Minutes\Location): PRN*  Manual:  Manual therapy  Soft Tissue Mobalization: scar massage (teach patient)*  *Indicates exercise,modality, or manual techniques to be initiated when appropriate  Assessment: Body structures, Functions, Activity limitations: Decreased functional mobility , Decreased strength, Decreased endurance, Increased pain  Assessment: Patient s/p right THR on 2/20/20 and reports some pain down right LE on lateral side. Upon PT evaluation, patient demonstrates decreased right hip strength and decreased endurance. Verbal cues needed for gait and steps for improve sequencing. Further outpatient PT recommended to increase strength and improve mobility for overall quality of life. Prognosis: Good  Discharge Recommendations: Continue to assess pending progress        Decision Making: Medium Complexity  History: COPD, HTN, hx of CVA  Exam: decreased LE strength, decreased endurance, imparied gait, imparied stairs  Clinical Presentation: evolving        Plan  Frequency/Duration:  Plan  Times per week: 2  Plan weeks: 6  Current Treatment Recommendations: Strengthening, ROM, Endurance Training, Gait Training, Stair training, Neuromuscular Re-education, Functional Mobility Training, Transfer Training, Manual Therapy - Soft Tissue Mobilization, Home Exercise Program, Safety Education & Training, Patient/Caregiver Education & Training, Equipment Evaluation, Education, & procurement, Modalities         Patient Education  New Education Provided: PT Education: Goals;PT Role;Home Exercise Program;Plan of Care    POST-PAIN     Pain Rating (0-10 pain scale):   4/10  Location and pain description same as pre-treatment unless indicated. Action: [] NA  [] Call Physician  [x] Perform HEP  [x] Meds as prescribed    Evaluation and patient rights have been reviewed and patient agrees with plan of care.   Yes  [x]  No  []   Explain:       Debbie Person Fall Risk

## 2020-03-12 ENCOUNTER — TELEPHONE (OUTPATIENT)
Dept: PULMONOLOGY | Age: 80
End: 2020-03-12

## 2020-03-12 ENCOUNTER — TELEPHONE (OUTPATIENT)
Dept: FAMILY MEDICINE CLINIC | Age: 80
End: 2020-03-12

## 2020-03-12 RX ORDER — ESCITALOPRAM OXALATE 10 MG/1
10 TABLET ORAL DAILY
Qty: 90 TABLET | Refills: 1 | Status: SHIPPED | OUTPATIENT
Start: 2020-03-12 | End: 2020-01-01 | Stop reason: SDUPTHER

## 2020-03-12 NOTE — TELEPHONE ENCOUNTER
Please call patient's son. Please let him know that I am sorry that Davina Dai is going through all of this. I have prescribed Lexapro 10 mg daily. Is is an every day medication and needs to be taken like that. It is not an as needed medicine. It starts to kick in at about 6 or 7 days. We will have to arrange a telemedicine visit to follow-up on her in the next 4 to 6 weeks. I am working on obtaining that technology. We will let him know.

## 2020-03-12 NOTE — TELEPHONE ENCOUNTER
Becky Overall is calling to let you know that his mother Mrs. Jaron Carvalho is having more memory loss after the surgery which it was a possibility from the surgery. She is getting more upset and all the other siblings are noticing too. Mention a seratoning prescription that you and them discussed on her last visit. He was wondering if you can send it to the pharmacy [facility].  Please advise and thanks!

## 2020-03-13 ENCOUNTER — TELEPHONE (OUTPATIENT)
Dept: FAMILY MEDICINE CLINIC | Age: 80
End: 2020-03-13

## 2020-03-13 NOTE — TELEPHONE ENCOUNTER
I am unable to supervise physical therapy related to a surgical events. The orthopedist needs to oversee that because I have no knowledge of the physical therapy that is required to rehabilitate from the surgery.

## 2020-03-13 NOTE — TELEPHONE ENCOUNTER
Matias Kilgore is calling back because he is in need of at home physical therapy. Orthopedic Dr. Kaitlyn Rouse is stating that the order needs to be done by you, and they will switch the order at that time. Please advise and thanks!

## 2020-03-13 NOTE — TELEPHONE ENCOUNTER
First attempt to reach patient. Called patient @ [phone] and left message on machine for patient to return call during normal business hours of 8:30 AM and 5 PM @ [phone] option 2.

## 2020-03-17 ENCOUNTER — HOSPITAL ENCOUNTER (OUTPATIENT)
Dept: PHYSICAL THERAPY | Age: 80
Setting detail: THERAPIES SERIES
Discharge: HOME | End: 2020-03-17
Payer: MEDICARE

## 2020-03-19 ENCOUNTER — APPOINTMENT (OUTPATIENT)
Dept: PHYSICAL THERAPY | Age: 80
End: 2020-03-19
Payer: MEDICARE

## 2020-03-19 ENCOUNTER — CLINICAL DOCUMENTATION (OUTPATIENT)
Dept: PHYSICAL THERAPY | Age: 80
End: 2020-03-19

## 2020-03-24 ENCOUNTER — APPOINTMENT (OUTPATIENT)
Dept: PHYSICAL THERAPY | Age: 80
End: 2020-03-24
Payer: MEDICARE

## 2020-03-24 ENCOUNTER — VIRTUAL VISIT (OUTPATIENT)
Dept: PULMONOLOGY | Age: 80
End: 2020-03-24
Payer: MEDICARE

## 2020-03-24 PROCEDURE — 1123F ACP DISCUSS/DSCN MKR DOCD: CPT | Performed by: INTERNAL MEDICINE

## 2020-03-24 PROCEDURE — G8926 SPIRO NO PERF OR DOC: HCPCS | Performed by: INTERNAL MEDICINE

## 2020-03-24 PROCEDURE — 1090F PRES/ABSN URINE INCON ASSESS: CPT | Performed by: INTERNAL MEDICINE

## 2020-03-24 PROCEDURE — 3023F SPIROM DOC REV: CPT | Performed by: INTERNAL MEDICINE

## 2020-03-24 PROCEDURE — 1036F TOBACCO NON-USER: CPT | Performed by: INTERNAL MEDICINE

## 2020-03-24 PROCEDURE — G8482 FLU IMMUNIZE ORDER/ADMIN: HCPCS | Performed by: INTERNAL MEDICINE

## 2020-03-24 PROCEDURE — G8417 CALC BMI ABV UP PARAM F/U: HCPCS | Performed by: INTERNAL MEDICINE

## 2020-03-24 PROCEDURE — G8428 CUR MEDS NOT DOCUMENT: HCPCS | Performed by: INTERNAL MEDICINE

## 2020-03-24 PROCEDURE — 4040F PNEUMOC VAC/ADMIN/RCVD: CPT | Performed by: INTERNAL MEDICINE

## 2020-03-24 PROCEDURE — G8399 PT W/DXA RESULTS DOCUMENT: HCPCS | Performed by: INTERNAL MEDICINE

## 2020-03-24 PROCEDURE — 99214 OFFICE O/P EST MOD 30 MIN: CPT | Performed by: INTERNAL MEDICINE

## 2020-03-24 ASSESSMENT — ENCOUNTER SYMPTOMS
SINUS PRESSURE: 0
SORE THROAT: 0
NAUSEA: 0
RHINORRHEA: 1
CHEST TIGHTNESS: 0
ABDOMINAL PAIN: 0
EYE DISCHARGE: 0
DIARRHEA: 0
EYE ITCHING: 0
WHEEZING: 1
VOMITING: 0
TROUBLE SWALLOWING: 0
SHORTNESS OF BREATH: 1
VOICE CHANGE: 0
COUGH: 1

## 2020-03-24 NOTE — PROGRESS NOTES
Subjective: Rolando Bennett is a 70 y.o. female who complains today of:     Chief Complaint   Patient presents with    COPD     2 month f/u       HPI  Patient is usingbronchodilator with Albuterol HFA prn bases, Anoro Ellipta 1 puff daily. She is using 2 lit with with sleep and prn during  Daytime. C/o shortness of breath with exertion. Occasional Wheezing   Cough with  Clear Sputum  No Hemoptysis  No Chest tightness   No Chest pain with radiation  or pleuritic pain  No Fever or chills. C/o Rhinorrhea and postnasal drip. She is on Flonase nasal spray. She was smoking 1 ppd. She quit 1 year ago   She has hip surgery on rt. Side 1 month ago and doing better. Allergies:  Patient has no known allergies.   Past Medical History:   Diagnosis Date    COPD (chronic obstructive pulmonary disease) [facility] [address] [facility]     Hypertension     Osteoarthritis     Tobacco use disorder 2019     Past Surgical History:   Procedure Laterality Date    EYE SURGERY      PER PROBLEM SHEET    HYSTERECTOMY      PER PROBLEM SHEET    LITHOTRIPSY      PER PROBLEM SHEET    VEIN SURGERY      PER PROBLEM SHEET     Family History   Problem Relation Age of Onset    Diabetes Other         GRANDMOTHER     Social History     Socioeconomic History    Marital status:      Spouse name: Not on file    Number of children: Not on file    Years of education: Not on file    Highest education level: Not on file   Occupational History    Not on file   Social Needs    Financial resource strain: Not on file    Food insecurity     Worry: Never true     Inability: Not on file    Transportation needs     Medical: No     Non-medical: No   Tobacco Use    Smoking status: Former Smoker     Packs/day: 0.75     Years: 45.00     Pack years: 33.75     Types: Cigarettes     Start date:      Last attempt to quit: 2019     Years since quittin.1    Smokeless tobacco: Never Used   Substance and Sexual Activity    Alcohol use: No     Comment: social    Drug use: No    Sexual activity: Not on file   Lifestyle    Physical activity     Days per week: 0 days     Minutes per session: 0 min    Stress: Only a little   Relationships    Social connections     Talks on phone: More than three times a week     Gets together: More than three times a week     Attends [employer] service: More than 4 times per year     Active member of club or organization: No     Attends meetings of clubs or organizations: Never     Relationship status:     Intimate partner violence     Fear of current or ex partner: No     Emotionally abused: No     Physically abused: No     Forced sexual activity: No   Other Topics Concern    Not on file   Social History Narrative         Lives With: Alone son is helpful and is her POA for health care    Type of Home: House    Home Layout: One level    Home Access: Stairs to enter with rails    Entrance Stairs - Number of Steps: 2    Bathroom Shower/Tub: Tub/Shower unit, Walk-in shower    Bathroom Equipment: Hand-held shower    ADL Assistance: Independent    Homemaking Assistance: Independent    Ambulation Assistance: Independent(No AD)    Transfer Assistance: Independent    Active : Yes    Additional Comments: Pt. reports she has fallen at home but not recently and not regularly. Pt. having word finding difficulty, but sons are present and clarify pt's answers         Review of Systems   Constitutional: Negative for chills, diaphoresis, fatigue and fever. HENT: Positive for rhinorrhea. Negative for congestion, mouth sores, nosebleeds, postnasal drip, sinus pressure, sneezing, sore throat, trouble swallowing and voice change. Eyes: Negative for discharge, itching and visual disturbance. Respiratory: Positive for cough, shortness of breath and wheezing. Negative for chest tightness. Cardiovascular: Negative for chest pain, palpitations and leg swelling.    Gastrointestinal: Negative for abdominal pain, diarrhea, nausea and vomiting. Genitourinary: Negative for difficulty urinating and hematuria. Musculoskeletal: Negative for arthralgias, joint swelling and myalgias. Skin: Negative for rash. Allergic/Immunologic: Negative for environmental allergies and food allergies. Neurological: Negative for dizziness, tremors, weakness and headaches. Psychiatric/Behavioral: Negative for behavioral problems and sleep disturbance.         :   There were no vitals filed for this visit. Wt Readings from Last 3 Encounters:   02/11/20 150 lb 4.8 oz (68.2 kg)   01/21/20 149 lb (67.6 kg)   01/08/20 153 lb (69.4 kg)         Physical Exam  virtual visit. Current Outpatient Medications   Medication Sig Dispense Refill    escitalopram (LEXAPRO) 10 MG tablet Take 1 tablet by mouth daily 90 tablet 1    amLODIPine (NORVASC) 5 MG tablet TAKE 1 TABLET BY MOUTH DAILY. 90 tablet 4    atorvastatin (LIPITOR) 20 MG tablet Take 1 tablet by mouth daily 90 tablet 4    umeclidinium-vilanterol (ANORO ELLIPTA) 62.5-25 MCG/INH AEPB inhaler Inhale 1 puff into the lungs daily 30 puff 3    OXYGEN Start oxygen therapy at 2 lit with activity and sleep , give POC for ambulation     Dx COPD 1 Units 0    albuterol sulfate  (90 Base) MCG/ACT inhaler Inhale 2 puffs into the lungs every morning (before breakfast) 1 Inhaler 3    aspirin 81 MG EC tablet Take 1 tablet by mouth daily 30 tablet 3    fluticasone (FLONASE) 50 MCG/ACT nasal spray 2 sprays by Nasal route daily 1 Bottle 5    alendronate (FOSAMAX) 70 MG tablet Take 1 tablet by mouth every 7 days 12 tablet 3    calcium carbonate 648 MG TABS Take 1 tablet by mouth 2 times daily 60 tablet 11    Multiple Vitamins-Minerals (PRESERVISION AREDS 2) CAPS Take 1 capsule by mouth daily 90 capsule 3    coenzyme Q10 100 MG CAPS capsule Take 100 mg by mouth daily      Ascorbic Acid (VITAMIN C) 500 MG tablet Take 500 mg by mouth daily.         vitamin D (CHOLECALCIFEROL) 1000 UNIT TABS tablet Take 1,000 Int'l Units by mouth daily. No current facility-administered medications for this visit. No results found for this or any previous visit.]  Results for orders placed during the hospital encounter of 11/12/19   XR CHEST PORTABLE    Narrative XR CHEST PORTABLE    Exam Date/Time:  11/12/2019 3:30 PM  Clinical History:   CVA   Comparison:  7/20/2014      RESULT:     Lines, tubes, and devices:  None. Lungs and pleura:  No consolidation. No pleural effusion. No pneumothorax. Normal pulmonary vascular pattern. Cardiomediastinal silhouette:  Normal. Aortic atherosclerotic calcification. Other:  No acute osseous findings. Impression No acute radiographic abnormality. Assessment/Plan:     1. Chronic obstructive pulmonary disease, unspecified COPD type ([address]  C/o shortness of breath with exertion. Occasional Wheezing . Cough with  Clear Sputum. She is on Anoro ellipta 1 puff daily and continue proair HFA prn , on  O2 due to hypoxia with activity. continue bronchodilator as before. 2. Hypoxia  She is on 2 lit O2 with sleep and prn during daytime. Continue O2 to keep Spo2 90%     3. Tobacco abuse quit 1 year ago   Patient advised to stay away smoking. Risks related to smoking explained to the patient. She quit 1 year ago. 4. Dyspnea on exertion  Patient is having  Chronic shortness of breath which is likely due to COPD, continue  Bronchodilator, O2 as before. keep  Spo2 90% or above. Return in about 2 months (around 5/24/2020) for COPD, hypoxia on O2.       Mey Hinton MD

## 2020-03-26 ENCOUNTER — APPOINTMENT (OUTPATIENT)
Dept: PHYSICAL THERAPY | Age: 80
End: 2020-03-26
Payer: MEDICARE

## 2020-03-31 ENCOUNTER — APPOINTMENT (OUTPATIENT)
Dept: PHYSICAL THERAPY | Age: 80
End: 2020-03-31
Payer: MEDICARE

## 2020-04-07 ENCOUNTER — VIRTUAL VISIT (OUTPATIENT)
Dept: FAMILY MEDICINE CLINIC | Age: 80
End: 2020-04-07
Payer: MEDICARE

## 2020-04-07 PROBLEM — Z72.0 TOBACCO ABUSE: Chronic | Status: RESOLVED | Noted: 2019-05-06 | Resolved: 2020-04-07

## 2020-04-07 PROBLEM — F41.9 ANXIETY: Chronic | Status: ACTIVE | Noted: 2020-04-07

## 2020-04-07 PROCEDURE — 99442 PR PHYS/QHP TELEPHONE EVALUATION 11-20 MIN: CPT | Performed by: FAMILY MEDICINE

## 2020-04-07 NOTE — PROGRESS NOTES
Jeannette Goldberg is a 68 y.o. female evaluated via telephone on 2020. Consent:  She and/or health care decision maker is aware that that she may receive a bill for this telephone service, depending on her insurance coverage, and has provided verbal consent to proceed: Yes      Documentation:  I communicated with the patient and/or health care decision maker about see below. Details of this discussion including any medical advice provided: see below      I affirm this is a Patient Initiated Episode with an Established Patient who has not had a related appointment within my department in the past 7 days or scheduled within the next 24 hours. Total Time: minutes: 11-20 minutes    Note: not billable if this call serves to triage the patient into an appointment for the relevant concern      Rahul YANCEY   2020    TELEHEALTH EVALUATION -- Audio/Visual (During ZVOPW-43 public health emergency)    HPI:    Jeannette Goldberg (:  1940) has requested an audio/video evaluation for the following concern(s):      COPD. On LABA/ICS and prn albuterol - less than 3 times a week. Use O2 at night and PRN during the day. No significant cough, mild baseline shortness of breath, occasional wheezing, mild, occasional chest tightness. CVA. No new deficits. No facial droop. No speech impairment. No unilateral weakness. Patient is here for f/u HTN. Is compliant with meds and has no side effects from them. Avoids added salt. Tries to eat healthy. Exercises occasionally. Has no chest pain, shortness of breath, palpitations or edema. Patient is being treated for depression/anxiety/cognitive impairment and has been compliant with meds which do not cause side effects. Mood is improved. Memory loss appears to be stabilized. No suicidal ideation. Sleep is normal.        Review of Systems  No fevers, chills, sweats. No unintended weight loss.   No abdominal pain, nausea, vomiting, diarrhea, Alcohol use: No     Comment: social    Drug use: No            PHYSICAL EXAMINATION:  [ INSTRUCTIONS:  \"[x]\" Indicates a positive item  \"[]\" Indicates a negative item  -- DELETE ALL ITEMS NOT EXAMINED]  Vital Signs: unavailable  Constitutional:no acute distress   Mental status  [x] Alert and awake  [x] Oriented to person/place/time [x]Able to follow commands        Pulmonary/Chest: [x] Respiratory effort normal.          Psychiatric:       [] Normal Affect [] No Hallucinations        [] Abnormal-         ASSESSMENT/PLAN:  Diagnoses and all orders for this visit:    Right-sided cerebrovascular accident [facility]  Comments:  Stable and well-controlled on ASA and statin    HTN (hypertension), benign  Comments:  Stable and well-controlled on amlodipine    Chronic obstructive pulmonary disease, unspecified COPD type ([facility] [address]  Comments:  stable and fairly well controlled on ICS/LABA,     Mild cognitive impairment  Comments:  Stable, consider Aricept    Anxiety  Comments:  Improving on SSRI          No follow-ups on file. Maria Teresa Chavira is a 68 y.o. female being evaluated by a Virtual Visit (telephonic visit) encounter to address concerns as mentioned above. A caregiver was present when appropriate. Due to this being a TeleHealth encounter (During JBSKO-91 public health emergency), evaluation of the following organ systems was limited: Vitals/Constitutional/EENT/Resp/CV/GI//MS/Neuro/Skin/Heme-Lymph-Imm. Pursuant to the emergency declaration under the [facility], [address] [place] [address] authority and the [email or web address] and [employer] Act, this Virtual Visit was conducted with patient's (and/or legal guardian's) consent, to reduce the patient's risk of exposure to COVID-19 and provide necessary medical care.   The patient (and/or legal guardian) has also been advised to contact this office for worsening conditions or problems, and seek emergency medical

## 2020-05-06 ENCOUNTER — OFFICE VISIT (OUTPATIENT)
Dept: NEUROLOGY | Age: 80
End: 2020-05-06
Payer: MEDICARE

## 2020-05-06 VITALS — HEART RATE: 74 BPM | DIASTOLIC BLOOD PRESSURE: 75 MMHG | SYSTOLIC BLOOD PRESSURE: 131 MMHG

## 2020-05-06 PROBLEM — J34.89 POSTERIOR RHINORRHEA: Status: ACTIVE | Noted: 2019-09-09

## 2020-05-06 PROBLEM — M16.0: Status: ACTIVE | Noted: 2019-12-31

## 2020-05-06 PROCEDURE — G8427 DOCREV CUR MEDS BY ELIG CLIN: HCPCS | Performed by: PSYCHIATRY & NEUROLOGY

## 2020-05-06 PROCEDURE — G8399 PT W/DXA RESULTS DOCUMENT: HCPCS | Performed by: PSYCHIATRY & NEUROLOGY

## 2020-05-06 PROCEDURE — G8417 CALC BMI ABV UP PARAM F/U: HCPCS | Performed by: PSYCHIATRY & NEUROLOGY

## 2020-05-06 PROCEDURE — 1090F PRES/ABSN URINE INCON ASSESS: CPT | Performed by: PSYCHIATRY & NEUROLOGY

## 2020-05-06 PROCEDURE — 4040F PNEUMOC VAC/ADMIN/RCVD: CPT | Performed by: PSYCHIATRY & NEUROLOGY

## 2020-05-06 PROCEDURE — 1123F ACP DISCUSS/DSCN MKR DOCD: CPT | Performed by: PSYCHIATRY & NEUROLOGY

## 2020-05-06 PROCEDURE — 1036F TOBACCO NON-USER: CPT | Performed by: PSYCHIATRY & NEUROLOGY

## 2020-05-06 PROCEDURE — 99214 OFFICE O/P EST MOD 30 MIN: CPT | Performed by: PSYCHIATRY & NEUROLOGY

## 2020-05-06 NOTE — PROGRESS NOTES
and palpitations. Gastrointestinal: Negative for nausea and vomiting. Musculoskeletal: Negative for back pain, gait problem, joint swelling, myalgias, neck pain and neck stiffness. Skin: Negative for color change. Allergic/Immunologic: Negative for food allergies. Neurological: Negative for dizziness, tremors, seizures, syncope, facial asymmetry, speech difficulty, weakness, light-headedness, numbness and headaches. Psychiatric/Behavioral: Negative for behavioral problems, confusion, hallucinations and sleep disturbance. Objective:   /75 (Site: Left Upper Arm, Position: Sitting, Cuff Size: Medium Adult)   Pulse 74   LMP  (LMP Unknown)     Physical Exam  Vitals signs reviewed. Eyes:      Pupils: Pupils are equal, round, and reactive to light. Neck:      Musculoskeletal: Normal range of motion. Cardiovascular:      Rate and Rhythm: Normal rate and regular rhythm. Heart sounds: No murmur. Pulmonary:      Effort: Pulmonary effort is normal.      Breath sounds: Normal breath sounds. Abdominal:      General: Bowel sounds are normal.   Musculoskeletal: Normal range of motion. Skin:     General: Skin is warm. Neurological:      Mental Status: She is alert and oriented to person, place, and time. Cranial Nerves: No cranial nerve deficit. Sensory: No sensory deficit. Motor: No abnormal muscle tone. Coordination: Coordination normal.      Deep Tendon Reflexes: Reflexes are normal and symmetric. Babinski sign absent on the right side. Babinski sign absent on the left side. Psychiatric:         Mood and Affect: Mood normal.         No results found.     Lab Results   Component Value Date    WBC 4.0 11/13/2019    RBC 4.29 11/13/2019    RBC 4.30 02/06/2012    HGB 13.0 11/13/2019    HCT 40.1 11/13/2019    MCV 93.4 11/13/2019    MCH 30.3 11/13/2019    MCHC 32.5 11/13/2019    RDW 13.9 11/13/2019     11/13/2019    MPV 8.9 07/20/2014     Lab Results   Component anxious regarding this. While she otherwise appears to be independent in her activity of daily living. That she has not had any bleeding or bruising. She has not developed any choking. We will follow her after MRI. Plan:      Orders Placed This Encounter   Procedures    MRI Brain WO Contrast     Standing Status:   Future     Standing Expiration Date:   5/6/2021     Order Specific Question:   Reason for exam:     Answer:   stroke     No orders of the defined types were placed in this encounter. Return in about 6 months (around 11/6/2020).       Angel Dan MD

## 2020-05-07 ASSESSMENT — ENCOUNTER SYMPTOMS
TROUBLE SWALLOWING: 0
VOMITING: 0
CHOKING: 0
BACK PAIN: 0
PHOTOPHOBIA: 0
COLOR CHANGE: 0
SHORTNESS OF BREATH: 0
NAUSEA: 0

## 2020-05-19 ENCOUNTER — HOSPITAL ENCOUNTER (OUTPATIENT)
Dept: MRI IMAGING | Age: 80
Discharge: HOME | End: 2020-05-21
Payer: MEDICARE

## 2020-05-19 PROCEDURE — 70551 MRI BRAIN STEM W/O DYE: CPT

## 2020-05-26 ENCOUNTER — TELEPHONE (OUTPATIENT)
Dept: NEUROLOGY | Age: 80
End: 2020-05-26

## 2020-05-26 NOTE — TELEPHONE ENCOUNTER
Patients son called wondering if you could go over MRI results from his mothers test.  They have not heard anything and just want to make sure that everything is okay or if they need to make an additional appt or if she needs to have any medications added.

## 2020-05-28 ENCOUNTER — VIRTUAL VISIT (OUTPATIENT)
Dept: PULMONOLOGY | Age: 80
End: 2020-05-28
Payer: MEDICARE

## 2020-05-28 PROCEDURE — 99214 OFFICE O/P EST MOD 30 MIN: CPT | Performed by: INTERNAL MEDICINE

## 2020-05-28 PROCEDURE — 1090F PRES/ABSN URINE INCON ASSESS: CPT | Performed by: INTERNAL MEDICINE

## 2020-05-28 PROCEDURE — G8399 PT W/DXA RESULTS DOCUMENT: HCPCS | Performed by: INTERNAL MEDICINE

## 2020-05-28 PROCEDURE — 4040F PNEUMOC VAC/ADMIN/RCVD: CPT | Performed by: INTERNAL MEDICINE

## 2020-05-28 PROCEDURE — G8427 DOCREV CUR MEDS BY ELIG CLIN: HCPCS | Performed by: INTERNAL MEDICINE

## 2020-05-28 PROCEDURE — 1123F ACP DISCUSS/DSCN MKR DOCD: CPT | Performed by: INTERNAL MEDICINE

## 2020-05-28 ASSESSMENT — ENCOUNTER SYMPTOMS
COUGH: 1
EYE DISCHARGE: 0
SORE THROAT: 0
CHEST TIGHTNESS: 0
VOMITING: 0
DIARRHEA: 0
RHINORRHEA: 0
TROUBLE SWALLOWING: 0
NAUSEA: 0
EYE ITCHING: 0
VOICE CHANGE: 0
WHEEZING: 0
ABDOMINAL PAIN: 0
SHORTNESS OF BREATH: 1
SINUS PRESSURE: 0

## 2020-05-28 NOTE — PROGRESS NOTES
umeclidinium-vilanterol (ANORO ELLIPTA) 62.5-25 MCG/INH AEPB inhaler Inhale 1 puff into the lungs daily  Clearence Apgar, MD   OXYGEN Start oxygen therapy at 2 lit with activity and sleep , give POC for ambulation     Dx COPD  Clearence Apgar, MD   albuterol sulfate  (90 Base) MCG/ACT inhaler Inhale 2 puffs into the lungs every morning (before breakfast)  Patient not taking: Reported on 2020  [facility] DO   aspirin 81 MG EC tablet Take 1 tablet by mouth daily  Yasmin Yang,    fluticasone (FLONASE) 50 MCG/ACT nasal spray 2 sprays by Nasal route daily  Deepak Hguhes MD   alendronate (FOSAMAX) 70 MG tablet Take 1 tablet by mouth every 7 days  Patient not taking: Reported on 2020  Deepak Hughes MD   calcium carbonate 648 MG TABS Take 1 tablet by mouth 2 times daily  Deepak Hughes MD   Multiple Vitamins-Minerals (PRESERVISION AREDS 2) CAPS Take 1 capsule by mouth daily  Patient not taking: Reported on 2020  Deepak Hughes MD   coenzyme Q10 100 MG CAPS capsule Take 100 mg by mouth daily  Historical Provider, MD   Ascorbic Acid (VITAMIN C) 500 MG tablet Take 500 mg by mouth daily. Historical Provider, MD   vitamin D (CHOLECALCIFEROL) 1000 UNIT TABS tablet Take 1,000 Int'l Units by mouth daily.     Historical Provider, MD       Social History     Tobacco Use    Smoking status: Former Smoker     Packs/day: 0.75     Years: 45.00     Pack years: 33.75     Types: Cigarettes     Start date:      Last attempt to quit: 2019     Years since quittin.3    Smokeless tobacco: Never Used   Substance Use Topics    Alcohol use: No     Comment: social    Drug use: No        No Known Allergies    PHYSICAL EXAMINATION:  [ INSTRUCTIONS:  \"[x]\" Indicates a positive item  \"[]\" Indicates a negative item  -- DELETE ALL ITEMS NOT EXAMINED]  Vital Signs: (As obtained by patient/caregiver or practitioner observation)    Blood pressure-  Heart rate-    Respiratory rate-    Temperature-  Pulse oximetry-     Constitutional: [x] Appears well-developed and well-nourished [x] No apparent distress      [] Abnormal-   Mental status  [x] Alert and awake  [x] Oriented to person/place/time [x]Able to follow commands      Eyes:  EOM    []  Normal  [] Abnormal-  Sclera  []  Normal  [] Abnormal -         Discharge []  None visible  [] Abnormal -    HENT:   [x] Normocephalic, atraumatic. [] Abnormal   [] Mouth/Throat: Mucous membranes are moist.     External Ears [] Normal  [] Abnormal-     Neck: [x] No visualized mass     Pulmonary/Chest: [x] Respiratory effort normal.  [x] No visualized signs of difficulty breathing or respiratory distress        [] Abnormal-      Musculoskeletal:   [] Normal gait with no signs of ataxia         [] Normal range of motion of neck        [] Abnormal-       Neurological:        [x] No Facial Asymmetry (Cranial nerve 7 motor function) (limited exam to video visit)          [] No gaze palsy        [] Abnormal-         Skin:        [] No significant exanthematous lesions or discoloration noted on facial skin         [] Abnormal-            Psychiatric:       [x] Normal Affect [x] No Hallucinations        [] Abnormal-     Other pertinent observable physical exam findings-     ASSESSMENT/PLAN:  1. Chronic obstructive pulmonary disease, unspecified COPD type ([address]  C/o shortness of breath  with exertion. No Wheezing , C/o Cough with white Sputum. Using bronchodilator with Albuterol HFA prn bases, Anoro Ellipta 1 puff daily. Continue O2 and bronchodilator as before. 2. Hypoxia  She  is using 2 lit with with sleep and prn during daytime, continue O2 to keep Spo2 90%    3. Dyspnea on exertion  Patient is having  Chronic shortness of breath which is likely due to COPD,  continue  Bronchodilator, O2 as before. keep  Spo2 90% or above. Return in about 3 months (around 8/28/2020) for COPD, hypoxia on O2. Hiro Sportsmichelle is a 68 y.o. female being evaluated by a Virtual Visit (video visit) encounter to address concerns as mentioned above. A caregiver was present when appropriate. Due to this being a TeleHealth encounter (During RMQXE-05 public health emergency), evaluation of the following organ systems was limited: Vitals/Constitutional/EENT/Resp/CV/GI//MS/Neuro/Skin/Heme-Lymph-Imm. Pursuant to the emergency declaration under the [address] [place] [address], [address] [place] [address] and the [employer] and [employer] Act, this Virtual Visit was conducted with patient's (and/or legal guardian's) consent, to reduce the patient's risk of exposure to COVID-19 and provide necessary medical care. The patient (and/or legal guardian) has also been advised to contact this office for worsening conditions or problems, and seek emergency medical treatment and/or call 911 if deemed necessary. Patient identification was verified at the start of the visit: Yes    Total time spent on this encounter: 26 min    Services were provided through a video synchronous discussion virtually to substitute for in-person clinic visit. Patient and provider were located at their individual homes. --Deon Santana MD on 5/28/2020 at 2:02 PM    An electronic signature was used to authenticate this note.

## 2020-06-04 RX ORDER — DONEPEZIL HYDROCHLORIDE 5 MG/1
5 TABLET, FILM COATED ORAL NIGHTLY
Qty: 30 TABLET | Refills: 3 | Status: SHIPPED | OUTPATIENT
Start: 2020-06-04 | End: 2020-06-29 | Stop reason: SDUPTHER

## 2020-06-22 ENCOUNTER — VIRTUAL VISIT (OUTPATIENT)
Dept: FAMILY MEDICINE CLINIC | Age: 80
End: 2020-06-22
Payer: MEDICARE

## 2020-06-22 PROBLEM — F01.50 VASCULAR DEMENTIA (HCC): Chronic | Status: ACTIVE | Noted: 2020-06-22

## 2020-06-22 PROCEDURE — 99443 PR PHYS/QHP TELEPHONE EVALUATION 21-30 MIN: CPT | Performed by: FAMILY MEDICINE

## 2020-06-22 NOTE — PROGRESS NOTES
orders for this visit:    Cerebrovascular accident (CVA), unspecified mechanism ([facility]  Comments:  improving, fair control; followed by Dr. Terrie Marc. Chronic obstructive pulmonary disease, unspecified COPD type ([facility] [address]  Comments:  Stable and well-controlled on ICS/LABA, ERICA and O2. Essential (primary) hypertension  Comments:  Stable and well controlled      Arthropathy of both hips  Comments:  Stable. COnsidering left JAME. Cigarette nicotine dependence in remission  Comments:  Unable to schedule at this time d/t CMS and date    Encounter for screening mammogram for malignant neoplasm of breast  Comments:  Unable to schedule at this time d/t CMS and date    Vascular dementia without behavioral disturbance ([facility]  Comments:  improving, well-controlled on Aricept 5 mg nightly. No follow-ups on file. Janusz Cifuentes is a 68 y.o. female being evaluated by a Virtual Visit (telephonic visit) encounter to address concerns as mentioned above. A caregiver was present when appropriate. Due to this being a TeleHealth encounter (During MOQFB-21 public health emergency), evaluation of the following organ systems was limited: Vitals/Constitutional/EENT/Resp/CV/GI//MS/Neuro/Skin/Heme-Lymph-Imm. Pursuant to the emergency declaration under the [address] [place] [address] authority and the [employer] and [employer] Act, this Virtual Visit was conducted with patient's (and/or legal guardian's) consent, to reduce the patient's risk of exposure to COVID-19 and provide necessary medical care. The patient (and/or legal guardian) has also been advised to contact this office for worsening conditions or problems, and seek emergency medical treatment and/or call 911 if deemed necessary. Services were provided through a telephonic synchronous discussion virtually to substitute for in-person clinic visit.  Patient and provider were located at their

## 2020-06-29 RX ORDER — DONEPEZIL HYDROCHLORIDE 5 MG/1
5 TABLET, FILM COATED ORAL NIGHTLY
Qty: 30 TABLET | Refills: 3 | Status: SHIPPED | OUTPATIENT
Start: 2020-06-29 | End: 2020-01-01 | Stop reason: SDUPTHER

## 2020-07-10 ENCOUNTER — NURSE TRIAGE (OUTPATIENT)
Dept: OTHER | Facility: CLINIC | Age: 80
End: 2020-07-10

## 2020-07-10 ENCOUNTER — OFFICE VISIT (OUTPATIENT)
Dept: FAMILY MEDICINE CLINIC | Age: 80
End: 2020-07-10
Payer: MEDICARE

## 2020-07-10 PROCEDURE — 1036F TOBACCO NON-USER: CPT | Performed by: FAMILY MEDICINE

## 2020-07-10 PROCEDURE — G8417 CALC BMI ABV UP PARAM F/U: HCPCS | Performed by: FAMILY MEDICINE

## 2020-07-10 PROCEDURE — 4040F PNEUMOC VAC/ADMIN/RCVD: CPT | Performed by: FAMILY MEDICINE

## 2020-07-10 PROCEDURE — G8428 CUR MEDS NOT DOCUMENT: HCPCS | Performed by: FAMILY MEDICINE

## 2020-07-10 PROCEDURE — 1123F ACP DISCUSS/DSCN MKR DOCD: CPT | Performed by: FAMILY MEDICINE

## 2020-07-10 PROCEDURE — G8399 PT W/DXA RESULTS DOCUMENT: HCPCS | Performed by: FAMILY MEDICINE

## 2020-07-10 PROCEDURE — 99213 OFFICE O/P EST LOW 20 MIN: CPT | Performed by: FAMILY MEDICINE

## 2020-07-10 PROCEDURE — 1090F PRES/ABSN URINE INCON ASSESS: CPT | Performed by: FAMILY MEDICINE

## 2020-07-10 ASSESSMENT — ENCOUNTER SYMPTOMS
CHEST TIGHTNESS: 0
BACK PAIN: 0
SORE THROAT: 0
RHINORRHEA: 0
COLOR CHANGE: 1
STRIDOR: 0
PHOTOPHOBIA: 0
DIARRHEA: 0
EYE DISCHARGE: 0
APNEA: 0
FACIAL SWELLING: 0
ABDOMINAL DISTENTION: 0
RECTAL PAIN: 0
SHORTNESS OF BREATH: 0
EYE ITCHING: 0
CONSTIPATION: 0
VOMITING: 0
ANAL BLEEDING: 0

## 2020-07-10 NOTE — TELEPHONE ENCOUNTER
Reason for Disposition   [1] Limp when walking AND [2] due to a direct blow or crushing injury    Protocols used: FOOT AND ANKLE INJURY-ADULT-    [facility] with red flag complaint. Voicemail left. Call returned to patient. Caller is pt's son. He states pt fell down some stairs yesterday. She now has swelling on her left foot. He thinks toes may have been pushed under stair. Pt feel to bottom, no loc, was able to get back up stairs without assistance. Bruising also. Son states they used ice and elevation and foot is better than yesterday. She can bear weight on LLE. Triage indicates for pt to be seen in the next 24 hours. Pt instructed to call back for any new or worsening symptoms. Patient verbalized understanding. Patient denies any other questions or concerns. Writer provided warm transfer to [facility] for appointment scheduling. Please do not respond to the triage nurse through this encounter. Any subsequent communication should be directly with the patient.

## 2020-07-10 NOTE — PROGRESS NOTES
7/10/2020    TELEHEALTH EVALUATION -- Audio/Visual (During XPNHZ-94 public health emergency)    HPI:    Ambrosio Sandra (:  1940) has requested an audio/video evaluation for the following concern(s):     Ambrosio Sandra is a 68 y.o. female who presents with left foot pain. Onset of the symptoms was yesterday. Precipitating event: inversion injury to foot. Current symptoms include: ability to bear weight, but with some pain, swelling and worsening symptoms after a period of activity. Aggravating factors: any weight bearing, going up and down stairs, standing and walking. Symptoms have stabilized. Patient has had no prior foot problems. Evaluation to date: none. Treatment to date: none. Patient was going down the steps in the basement and fell down the steps in the basement. Patient reports that she bruised the top of the foot. Patient reports that bruised the top of the foot. Review of Systems   Constitutional: Negative for activity change, appetite change, fatigue and fever. HENT: Negative for congestion, dental problem, facial swelling, hearing loss, postnasal drip, rhinorrhea, sneezing, sore throat and tinnitus. Eyes: Negative for photophobia, discharge, itching and visual disturbance. Respiratory: Negative for apnea, chest tightness, shortness of breath and stridor. Cardiovascular: Negative for chest pain, palpitations and leg swelling. Gastrointestinal: Negative for abdominal distention, anal bleeding, constipation, diarrhea, rectal pain and vomiting. Endocrine: Negative for cold intolerance, heat intolerance, polyphagia and polyuria. Genitourinary: Negative for decreased urine volume, difficulty urinating, dyspareunia, flank pain, frequency, pelvic pain, urgency, vaginal bleeding and vaginal discharge. Musculoskeletal: Negative for arthralgias and back pain. Admits to left foot pain and brusing. Skin: Positive for color change. Negative for rash and wound. Neurological: Negative for dizziness, seizures, syncope, facial asymmetry, speech difficulty and weakness. Hematological: Negative for adenopathy. Does not bruise/bleed easily. Psychiatric/Behavioral: Negative for agitation, behavioral problems, hallucinations, sleep disturbance and suicidal ideas. The patient is not hyperactive. Prior to Visit Medications    Medication Sig Taking? Authorizing Provider   donepezil (ARICEPT) 5 MG tablet Take 1 tablet by mouth nightly  Aguila Terrell MD   escitalopram (LEXAPRO) 10 MG tablet Take 1 tablet by mouth daily  Nunu Maloney MD   amLODIPine (NORVASC) 5 MG tablet TAKE 1 TABLET BY MOUTH DAILY.   Nunu Maloney MD   atorvastatin (LIPITOR) 20 MG tablet Take 1 tablet by mouth daily  Nunu Maloney MD   umeclidinium-vilanterol (ANORO ELLIPTA) 62.5-25 MCG/INH AEPB inhaler Inhale 1 puff into the lungs daily  Susanna Rosen MD   OXYGEN Start oxygen therapy at 2 lit with activity and sleep , give POC for ambulation     Dx COPD  Susanna Rosen MD   albuterol sulfate  (90 Base) MCG/ACT inhaler Inhale 2 puffs into the lungs every morning (before breakfast)  Patient not taking: Reported on 5/6/2020  Hany Epps DO   aspirin 81 MG EC tablet Take 1 tablet by mouth daily  Robert Chirinos DO   fluticasone (FLONASE) 50 MCG/ACT nasal spray 2 sprays by Nasal route daily  Nunu Maloney MD   alendronate (FOSAMAX) 70 MG tablet Take 1 tablet by mouth every 7 days  Patient not taking: Reported on 5/6/2020  Nunu Maloney MD   calcium carbonate 648 MG TABS Take 1 tablet by mouth 2 times daily  Nunu Maloney MD   Multiple Vitamins-Minerals (PRESERVISION AREDS 2) CAPS Take 1 capsule by mouth daily  Patient not taking: Reported on 5/6/2020  Nunu Maloney MD   coenzyme Q10 100 MG CAPS capsule Take 100 mg by mouth daily  Historical Provider, MD   Ascorbic Acid (VITAMIN C) 500 MG tablet Take 500 mg by mouth daily. Historical Provider, MD   vitamin D (CHOLECALCIFEROL) 1000 UNIT TABS tablet Take 1,000 Int'l Units by mouth daily.     Historical Provider, MD       Social History     Tobacco Use    Smoking status: Former Smoker     Packs/day: 0.75     Years: 45.00     Pack years: 33.75     Types: Cigarettes     Start date:      Last attempt to quit: 2019     Years since quittin.4    Smokeless tobacco: Never Used   Substance Use Topics    Alcohol use: No     Comment: social    Drug use: No        No Known Allergies,   Past Medical History:   Diagnosis Date    [facility]) ([facility]     Hypertension     Osteoarthritis     Tobacco abuse 2019    Tobacco use disorder 2019    Vascular dementia ([facility] 2020   ,   Past Surgical History:   Procedure Laterality Date    EYE SURGERY  2010    PER PROBLEM SHEET    HYSTERECTOMY      PER PROBLEM SHEET    LITHOTRIPSY      PER PROBLEM SHEET    VEIN SURGERY  1988    PER PROBLEM SHEET   ,   Social History     Tobacco Use    Smoking status: Former Smoker     Packs/day: 0.75     Years: 45.00     Pack years: 33.75     Types: Cigarettes     Start date:      Last attempt to quit: 2019     Years since quittin.4    Smokeless tobacco: Never Used   Substance Use Topics    Alcohol use: No     Comment: social    Drug use: No   ,   Family History   Problem Relation Age of Onset    Diabetes Other         GRANDMOTHER   ,   Immunization History   Administered Date(s) Administered    Influenza, High Dose (Fluzone 65 yrs and older) 2018    Influenza, Quadv, IM, PF (6 mo and older Fluzone, Flulaval, Fluarix, and 3 yrs and older Afluria) 2019    Pneumococcal Conjugate 13-valent ([id number]) 2015    Pneumococcal Polysaccharide ([id number]) 2013   ,   Health Maintenance   Topic Date Due    DTaP/Tdap/Td vaccine (1 - Tdap) 2020 (Originally 3/29/1959)    Shingles Vaccine (1 of 2) 09/09/2020 (Originally 3/29/1990)    Flu vaccine (1) 09/01/2020    Potassium monitoring  11/13/2020    Creatinine monitoring  11/13/2020    Annual Wellness Visit (AWV)  12/24/2020    Lipid screen  02/11/2021    Pneumococcal 65+ years Vaccine  Completed    DEXA (modify frequency per FRAX score)  Addressed    Hepatitis A vaccine  Aged Out    Hepatitis B vaccine  Aged Out    Hib vaccine  Aged Out    Meningococcal (ACWY) vaccine  Aged Out       PHYSICAL EXAMINATION:  [ INSTRUCTIONS:  \"[x]\" Indicates a positive item  \"[]\" Indicates a negative item  -- DELETE ALL ITEMS NOT EXAMINED]  Vital Signs: (As obtained by patient/caregiver or practitioner observation)    Blood pressure-  Heart rate-    Respiratory rate-    Temperature-  Pulse oximetry-     Constitutional: [x] Appears well-developed and well-nourished [x] No apparent distress      [] Abnormal-   Mental status  [x] Alert and awake  [] Oriented to person/place/time []Able to follow commands      Eyes:  EOM    [x]  Normal  [] Abnormal-  Sclera  [x]  Normal  [] Abnormal -         Discharge []  None visible  [] Abnormal -    HENT:   [x] Normocephalic, atraumatic. [] Abnormal   [x] Mouth/Throat: Mucous membranes are moist.     External Ears [x] Normal  [] Abnormal-     Neck: [x] No visualized mass     Pulmonary/Chest: [x] Respiratory effort normal.  [x] No visualized signs of difficulty breathing or respiratory distress        [] Abnormal-      Musculoskeletal:   [x] Normal gait with no signs of ataxia         [] Normal range of motion of neck        [] Abnormal-  On the left foot there is bruising noted on the dorsal aspect. Tenderness to palpation on the metatarsal region of the 3rd digit. Able to flex and extend at the digits. Per son, foot is warm to touch. Swelling noted.      Neurological:        [x] No Facial Asymmetry (Cranial nerve 7 motor function) (limited exam to video visit)          [x] No gaze palsy        [] Abnormal-         Skin: [x] No significant exanthematous lesions or discoloration noted on facial skin         [] Abnormal-            Psychiatric:       [x] Normal Affect [] No Hallucinations        [] Abnormal-     Other pertinent observable physical exam findings-     ASSESSMENT/PLAN:  1. Acute foot pain, left  Responds to tylenol. Can take 1000 tylenol every 8 hours PRN for pain. Ice and elevate. Obtain xrays  - XR FOOT LEFT (MIN 3 VIEWS); Future      No follow-ups on file. Kelle Warren is a 68 y.o. female being evaluated by a Virtual Visit (video visit) encounter to address concerns as mentioned above. A caregiver was present when appropriate. Due to this being a TeleHealth encounter (During XDMES-82 public health emergency), evaluation of the following organ systems was limited: Vitals/Constitutional/EENT/Resp/CV/GI//MS/Neuro/Skin/Heme-Lymph-Imm. Pursuant to the emergency declaration under the [address] [place] [address] authority and the [employer] Act, this Virtual Visit was conducted with patient's (and/or legal guardian's) consent, to reduce the patient's risk of exposure to COVID-19 and provide necessary medical care. The patient (and/or legal guardian) has also been advised to contact this office for worsening conditions or problems, and seek emergency medical treatment and/or call 911 if deemed necessary. Patient identification was verified at the start of the visit: Yes    Total time spent on this encounter: 20 minutes    Services were provided through a video synchronous discussion virtually to substitute for in-person clinic visit. Patient and provider were located at their individual homes. --Terri Alvarez MD on 7/10/2020 at 9:05 AM    An electronic signature was used to authenticate this note. On the basis of positive falls risk screening, assessment and plan is as follows: home safety tips provided.

## 2020-07-13 ENCOUNTER — TELEPHONE (OUTPATIENT)
Dept: FAMILY MEDICINE CLINIC | Age: 80
End: 2020-07-13

## 2020-08-09 NOTE — TELEPHONE ENCOUNTER
Pharmacy is  requesting medication refill.  Please approve or deny this request.    Rx requested:  Requested Prescriptions     Pending Prescriptions Disp Refills    calcium carbonate 648 MG TABS [Pharmacy Med Name: calcium carbonate 260 mg calcium (648 mg) tablet] 60 tablet 11     Sig: Take 1 tablet by mouth 2 times daily         Last Office Visit:   6/22/2020      Next Visit Date:  Future Appointments   Date Time Provider Lily Mathis   8/28/2020  9:45 AM Layvonne Cogan, MD  [address]   9/21/2020  3:30 PM Aneudy Pathak MD [facility]   9/28/2020  2:30 PM Ronal Fernando MD [facility]

## 2020-08-10 RX ORDER — ANTACID TABLETS 648 MG/1
1 TABLET, CHEWABLE ORAL 2 TIMES DAILY
Qty: 60 TABLET | Refills: 11 | Status: SHIPPED | OUTPATIENT
Start: 2020-08-10 | End: 2020-01-01 | Stop reason: SDUPTHER

## 2020-08-28 NOTE — PROGRESS NOTES
Subjective: Brianne Hein is a 68 y.o. female who complains today of:     Chief Complaint   Patient presents with    Follow-up       HPI  Patient is using  2 lit with with sleep and prn during daytime  She is  bronchodilator with Albuterol HFA prn bases, Anoro Ellipta 1 puff daily. C/o shortness of breath with exertion. Occasional Wheezing   C/o Cough with clearSputum  No Hemoptysis  No Chest tightness   No Chest pain with radiation  or pleuritic pain  No Fever or chills. C/o Rhinorrhea and postnasal drip. Allergies:  Patient has no known allergies.   Past Medical History:   Diagnosis Date    [facility]     Hypertension     Osteoarthritis     Tobacco abuse 2019    Tobacco use disorder 2019    Vascular dementia ([facility] 2020     Past Surgical History:   Procedure Laterality Date    EYE SURGERY      PER PROBLEM SHEET    HYSTERECTOMY      PER PROBLEM SHEET    LITHOTRIPSY  2004    PER PROBLEM SHEET    VEIN SURGERY      PER PROBLEM SHEET     Family History   Problem Relation Age of Onset    Diabetes Other         GRANDMOTHER     Social History     Socioeconomic History    Marital status:      Spouse name: Not on file    Number of children: Not on file    Years of education: Not on file    Highest education level: Not on file   Occupational History    Not on file   Social Needs    Financial resource strain: Not on file    Food insecurity     Worry: Never true     Inability: Not on file    Transportation needs     Medical: No     Non-medical: No   Tobacco Use    Smoking status: Former Smoker     Packs/day: 0.75     Years: 45.00     Pack years: 33.75     Types: Cigarettes     Start date:      Last attempt to quit: 2019     Years since quittin.5    Smokeless tobacco: Never Used   Substance and Sexual Activity    Alcohol use: No     Comment: social    Drug use: No    Sexual activity: Not on file Lifestyle    Physical activity     Days per week: 0 days     Minutes per session: 0 min    Stress: Only a little   Relationships    Social connections     Talks on phone: More than three times a week     Gets together: More than three times a week     Attends L[employer] service: More than 4 times per year     Active member of club or organization: No     Attends meetings of clubs or organizations: Never     Relationship status:     Intimate partner violence     Fear of current or ex partner: No     Emotionally abused: No     Physically abused: No     Forced sexual activity: No   Other Topics Concern    Not on file   Social History Narrative         Lives With: Alone son is helpful and is her POA for health care    Type of Home: House    Home Layout: One level    Home Access: Stairs to enter with rails    Entrance Stairs - Number of Steps: 2    Bathroom Shower/Tub: Tub/Shower unit, Walk-in shower    Bathroom Equipment: Hand-held shower    ADL Assistance: Independent    Homemaking Assistance: Independent    Ambulation Assistance: Independent(No AD)    Transfer Assistance: Independent    Active : Yes    Additional Comments: Pt. reports she has fallen at home but not recently and not regularly. Pt. having word finding difficulty, but sons are present and clarify pt's answers         Review of Systems   Constitutional: Negative for chills, diaphoresis, fatigue and fever. HENT: Positive for postnasal drip. Negative for congestion, mouth sores, nosebleeds, rhinorrhea, sinus pressure, sneezing, sore throat, trouble swallowing and voice change. Eyes: Negative for discharge, itching and visual disturbance. Respiratory: Positive for cough, shortness of breath and wheezing. Negative for chest tightness. Cardiovascular: Negative for chest pain, palpitations and leg swelling. Gastrointestinal: Negative for abdominal pain, diarrhea, nausea and vomiting.    Genitourinary: Negative for difficulty daily      Ascorbic Acid (VITAMIN C) 500 MG tablet Take 500 mg by mouth daily.  vitamin D (CHOLECALCIFEROL) 1000 UNIT TABS tablet Take 1,000 Int'l Units by mouth daily. No current facility-administered medications for this visit. No results found for this or any previous visit.]  Results for orders placed during the hospital encounter of 11/12/19   XR CHEST PORTABLE    Narrative XR CHEST PORTABLE    Exam Date/Time:  11/12/2019 3:30 PM  Clinical History:   CVA   Comparison:  7/20/2014      RESULT:     Lines, tubes, and devices:  None. Lungs and pleura:  No consolidation. No pleural effusion. No pneumothorax. Normal pulmonary vascular pattern. Cardiomediastinal silhouette:  Normal. Aortic atherosclerotic calcification. Other:  No acute osseous findings. Impression No acute radiographic abnormality. Assessment/Plan:     1. Chronic obstructive pulmonary disease, unspecified COPD type ([address]  She is  bronchodilator with Albuterol HFA prn bases, Anoro Ellipta 1 puff daily. C/o shortness of breath with exertion. Occasional Wheezing . C/o Cough with clear Sputum. Continue bronchodilator therapy as before    2. Hypoxia  Is on 2 L during sleep and PRN. Continue O2 to keep saturation 90%    3. Post-nasal drip  Continue Flonase nasal spray  - fluticasone (FLONASE) 50 MCG/ACT nasal spray; 2 sprays by Nasal route daily  Dispense: 1 Bottle; Refill: 5    Patient notified that this is a billable service and has given verbal consent for telehealth services. Time spent with patient 21  minutes. Return in about 3 months (around 11/28/2020).       Chrystal Carbajal MD

## 2020-09-21 NOTE — PROGRESS NOTES
2020    TELEHEALTH EVALUATION -- Audio/Visual (During JOMTO-72 public health emergency)    HPI:    Aleksandra Ibarra (:  1940) has requested an audio/video evaluation for the following concern(s):    Recently underwent hip replacement surgery. Hip is improving a lot. Has stiffness with initial movement. Left hip need JAME as well.     COPD. On LABA/ICS and prn albuterol - less than 3 times a week. Use O2 at night and PRN during the day. No significant cough, mild baseline shortness of breath, occasional wheezing, mild, occasional chest tightness.  When she uses the O2 her memory is better.      CVA ad memory loss. Memory had declined some after JAME. MRI showed no new changes. Started Aricept about 4 month ago and she has noticed improved memory and better word-finding though word-finding is still aconcern to her. No facial droop.  No speech impairment. No unilateral weakness. Family is very supportive and she will be moving in with her son.       Patient is here for f/u HTN. Is compliant with meds and has no side effects from them. Avoids added salt. Tries to eat healthy. Exercises occasionally. Has no chest pain, shortness of breath, palpitations or edema.     Patient is being treated for depression/anxiety/cognitive impairment and has been compliant with meds which do not cause side effects. Mood is improved. Mood seems better than it has been in her adulthood No suicidal ideation. Sleep is normal.    Review of Systems see above     Prior to Visit Medications    Medication Sig Taking? Authorizing Provider   escitalopram (LEXAPRO) 10 MG tablet Take 1 tablet by mouth daily Yes Antoni Ceja MD   fluticasone (FLONASE) 50 MCG/ACT nasal spray 2 sprays by Nasal route daily Yes Nava Garcia MD   calcium carbonate 648 MG TABS Take 1 tablet by mouth 2 times daily Yes Antoni Ceja MD   amLODIPine (NORVASC) 5 MG tablet TAKE 1 TABLET BY MOUTH DAILY.  Yes Antoni Ceja MD SHEET    LITHOTRIPSY      PER PROBLEM SHEET    VEIN SURGERY      PER PROBLEM SHEET   ,   Social History     Tobacco Use    Smoking status: Former Smoker     Packs/day: 0.75     Years: 45.00     Pack years: 33.75     Types: Cigarettes     Start date:      Last attempt to quit: 2019     Years since quittin.8    Smokeless tobacco: Never Used   Substance Use Topics    Alcohol use: No     Comment: social    Drug use: No   ,   Family History   Problem Relation Age of Onset    Diabetes Other         GRANDMOTHER       PHYSICAL EXAMINATION:  [ INSTRUCTIONS:  \"[x]\" Indicates a positive item  \"[]\" Indicates a negative item  -- DELETE ALL ITEMS NOT EXAMINED]  Vital Signs: (As obtained by patient/caregiver or practitioner observation)    Blood pressure-  Heart rate-    Respiratory rate-    Temperature-  Pulse oximetry-     Constitutional: [x] Appears well-developed and well-nourished [x] No apparent distress      [] Abnormal-   Mental status  [x] Alert and awake  [x] Oriented to person/place/time [x]Able to follow commands      Eyes:  EOM    [x]  Normal  [] Abnormal-  Sclera  [x]  Normal  [] Abnormal -         Discharge []  None visible  [] Abnormal -    HENT:   [x] Normocephalic, atraumatic.   [] Abnormal   [x] Mouth/Throat: Mucous membranes are moist.     External Ears [x] Normal  [] Abnormal-     Neck: [x] No visualized mass     Pulmonary/Chest: [x] Respiratory effort normal.  [] No visualized signs of difficulty breathing or respiratory distress        [] Abnormal-      Musculoskeletal:   [x] Normal gait with no signs of ataxia         [x] Normal range of motion of neck        [] Abnormal-       Neurological:        [x] No Facial Asymmetry (Cranial nerve 7 motor function) (limited exam to video visit)          [x] No gaze palsy        [] Abnormal-         Skin:        [x] No significant exanthematous lesions or discoloration noted on facial skin         [] Abnormal-            Psychiatric:       [x] virtually to substitute for in-person clinic visit. Patient and provider were located at their individual homes. --Niranjan Gibbs MD on 11/30/2020 at 2:49 PM    An electronic signature was used to authenticate this note.

## 2020-09-25 PROBLEM — I63.511 CEREBROVASCULAR ACCIDENT (CVA) DUE TO STENOSIS OF RIGHT MIDDLE CEREBRAL ARTERY (HCC): Status: ACTIVE | Noted: 2019-11-12

## 2020-09-25 PROBLEM — G31.84 MILD COGNITIVE IMPAIRMENT WITH MEMORY LOSS: Status: ACTIVE | Noted: 2020-01-01

## 2020-09-28 PROBLEM — I63.522 CEREBROVASCULAR ACCIDENT (CVA) DUE TO STENOSIS OF LEFT ANTERIOR CEREBRAL ARTERY (HCC): Status: ACTIVE | Noted: 2019-11-12

## 2020-09-28 NOTE — PROGRESS NOTES
Subjective:      Patient ID: Javon Aguillon is a 68 y.o. female who presents today for:  Chief Complaint   Patient presents with    Follow-up     Pt feels she is doing well. Son states that she is doing a lot more around the house and she seems to be remember things. She has been going up and down steps. Son states that there have just been a few things that she has not remembered the last few days. HPI 70-year-old right-handed female with a history of confusion and gait ataxia patient was admitted to the hospital with a small frontal stroke on the right with an abnormal MRI with cerebral vascular disease. Last MMSE score was 26 and she remains very functional.  She has multiple risk factors for cerebrovascular disease. Patient is not any falls injuries trauma or hospitalizations. She is doing quite well she is now moving with her son so she is very active. She had a hip replacement in February she did well except for the confusion which is expected in this vascular disease. She is contemplating a second surgery on the left but she is somewhat scared due to the same issues.   Her son is with her and he has not noticed any further worsening on issues    Past Medical History:   Diagnosis Date    COPD (chronic obstructive pulmonary disease) ([address]     Hypertension     Osteoarthritis     Tobacco abuse 5/6/2019    Tobacco use disorder 5/6/2019    Vascular dementia ([address] 6/22/2020     Past Surgical History:   Procedure Laterality Date    EYE SURGERY  2010    PER PROBLEM SHEET    HYSTERECTOMY  1975    PER PROBLEM SHEET    LITHOTRIPSY  2004    PER PROBLEM SHEET    VEIN SURGERY  1988    PER PROBLEM SHEET     Social History     Socioeconomic History    Marital status:      Spouse name: Not on file    Number of children: Not on file    Years of education: Not on file    Highest education level: Not on file   Occupational History    Not on file   Social Needs    Financial resource strain: Not on file    Food insecurity     Worry: Never true     Inability: Not on file    Transportation needs     Medical: No     Non-medical: No   Tobacco Use    Smoking status: Former Smoker     Packs/day: 0.75     Years: 45.00     Pack years: 33.75     Types: Cigarettes     Start date:      Last attempt to quit: 2019     Years since quittin.6    Smokeless tobacco: Never Used   Substance and Sexual Activity    Alcohol use: No     Comment: social    Drug use: No    Sexual activity: Not on file   Lifestyle    Physical activity     Days per week: 0 days     Minutes per session: 0 min    Stress: Only a little   Relationships    Social connections     Talks on phone: More than three times a week     Gets together: More than three times a week     Attends Jainism service: More than 4 times per year     Active member of club or organization: No     Attends meetings of clubs or organizations: Never     Relationship status:     Intimate partner violence     Fear of current or ex partner: No     Emotionally abused: No     Physically abused: No     Forced sexual activity: No   Other Topics Concern    Not on file   Social History Narrative         Lives With: Alone son is helpful and is her POA for health care    Type of Home: House    Home Layout: One level    Home Access: Stairs to enter with rails    Entrance Stairs - Number of Steps: 2    Bathroom Shower/Tub: Tub/Shower unit, Walk-in shower    Bathroom Equipment: Hand-held shower    ADL Assistance: Independent    Homemaking Assistance: Independent    Ambulation Assistance: Independent(No AD)    Transfer Assistance: Independent    Active : Yes    Additional Comments: Pt. reports she has fallen at home but not recently and not regularly.  Pt. having word finding difficulty, but sons are present and clarify pt's answers     Family History   Problem Relation Age of Onset    Diabetes Other         GRANDMOTHER     No Known Allergies    Current swelling, myalgias, neck pain and neck stiffness. Skin: Negative for color change. Allergic/Immunologic: Negative for food allergies. Neurological: Negative for dizziness, tremors, seizures, syncope, facial asymmetry, speech difficulty, weakness, light-headedness, numbness and headaches. Psychiatric/Behavioral: Negative for behavioral problems, confusion, hallucinations and sleep disturbance. Objective:   /87 (Site: Left Upper Arm, Position: Sitting, Cuff Size: Medium Adult)   Pulse 72   Wt 172 lb 12.8 oz (78.4 kg)   LMP  (LMP Unknown)   BMI 32.65 kg/m²     Physical Exam  Vitals signs reviewed. Eyes:      Pupils: Pupils are equal, round, and reactive to light. Neck:      Musculoskeletal: Normal range of motion. Cardiovascular:      Rate and Rhythm: Normal rate and regular rhythm. Heart sounds: No murmur. Pulmonary:      Effort: Pulmonary effort is normal.      Breath sounds: Normal breath sounds. Abdominal:      General: Bowel sounds are normal.   Musculoskeletal: Normal range of motion. Skin:     General: Skin is warm. Neurological:      Mental Status: She is alert and oriented to person, place, and time. Cranial Nerves: No cranial nerve deficit. Sensory: No sensory deficit. Motor: No abnormal muscle tone. Coordination: Coordination normal.      Deep Tendon Reflexes: Reflexes are normal and symmetric. Babinski sign absent on the right side. Babinski sign absent on the left side. Psychiatric:         Mood and Affect: Mood normal.         Us Dup Lower Extremity Left Tita    Result Date: 8/24/2020  COMPARISON: No prior studies available for comparison. HISTORY: M79.662 Pain of left calf ICD10 TECHNIQUE: US DUP LOWER EXTREMITY LEFT TITA FINDINGS: Ultrasound was performed from the left groin to the ankle. The common femoral, femoral and popliteal veins are augmentable and compressible. The Doppler images show blood flow.   The calf veins are also augmentable ischemic changes which are bilateral.  This is related to underlying multiple risk factors for cerebrovascular disease. Patient has chronic microhemorrhages and not amyloidosis. Patient has memory issues which are related to this and patient is a high risk for vascular disease and strokes and vascular dementia. For now she will continue on aspirin. This not much can be added to this I recommend that she keep her mind active if she has any issues call us. We will see her in a year with a follow-up carotid ultrasound. Plan:      Orders Placed This Encounter   Procedures    US CAROTID ARTERY BILATERAL     Standing Status:   Future     Standing Expiration Date:   9/28/2021     Scheduling Instructions: One week, before appt     Order Specific Question:   Reason for exam:     Answer:   stroke     Orders Placed This Encounter   Medications    donepezil (ARICEPT) 5 MG tablet     Sig: Take 1 tablet by mouth nightly     Dispense:  30 tablet     Refill:  3       Return in about 1 year (around 9/28/2021).       Jorge Rodgers MD

## 2020-11-06 NOTE — TELEPHONE ENCOUNTER
Rx request   Requested Prescriptions     Pending Prescriptions Disp Refills    alendronate (FOSAMAX) 70 MG tablet 12 tablet 3     Sig: Take 1 tablet by mouth every 7 days     LOV 9/21/2020  Next Visit Date:  Future Appointments   Date Time Provider Lily Delfina   11/24/2020  3:45 PM Dayne Marie MD [address]   12/22/2020 11:30 AM Jose Hua MD [facility]   9/18/2021  9:00 AM [place] ULTRASOUND 2 [facility]LOZ [facility]TRA [facility] Fac RAD   9/29/2021  1:15 PM Josephine Su MD [facility]

## 2020-11-09 NOTE — TELEPHONE ENCOUNTER
Rx request   Requested Prescriptions     Pending Prescriptions Disp Refills    alendronate (FOSAMAX) 70 MG tablet 12 tablet 3     Sig: Take 1 tablet by mouth every 7 days     LOV 9/21/2020  Next Visit Date:  Future Appointments   Date Time Provider Lily Mathis   11/24/2020  3:45 PM Matilda Herbert MD [address]   12/22/2020 11:30 AM Camden Lomeli MD [facility]   9/18/2021  9:00 AM [place] ULTRASOUND 2 MLOZ ULTRA [facility] Fac RAD   9/29/2021  1:15 PM Mikaela Yoon MD [facility]

## 2020-11-24 NOTE — PROGRESS NOTES
Subjective: Kandy Nielsen is a 68 y.o. female who complains today of:     Chief Complaint   Patient presents with    Follow-up       HPI  Patient is using 2 lit with with sleep and prn during daytime. She is  bronchodilator with Albuterol HFA prn bases, Anoro Ellipta 1 puff daily. C/o shortness of breath  with exertion. No Wheezing   No Cough with Sputum  No Hemoptysis  No Chest tightness   No Chest pain with radiation  or pleuritic pain  No Fever or chills. No Rhinorrhea and postnasal drip. Allergies:  Patient has no known allergies.   Past Medical History:   Diagnosis Date    [facility]) [facility]     Hypertension     Osteoarthritis     Tobacco abuse 2019    Tobacco use disorder 2019    Vascular dementia ([facility] 2020     Past Surgical History:   Procedure Laterality Date    EYE SURGERY      PER PROBLEM SHEET    HYSTERECTOMY      PER PROBLEM SHEET    LITHOTRIPSY      PER PROBLEM SHEET    VEIN SURGERY  1988    PER PROBLEM SHEET     Family History   Problem Relation Age of Onset    Diabetes Other         GRANDMOTHER     Social History     Socioeconomic History    Marital status:      Spouse name: Not on file    Number of children: Not on file    Years of education: Not on file    Highest education level: Not on file   Occupational History    Not on file   Social Needs    Financial resource strain: Not on file    Food insecurity     Worry: Never true     Inability: Not on file    Transportation needs     Medical: No     Non-medical: No   Tobacco Use    Smoking status: Former Smoker     Packs/day: 0.75     Years: 45.00     Pack years: 33.75     Types: Cigarettes     Start date:      Last attempt to quit: 2019     Years since quittin.8    Smokeless tobacco: Never Used   Substance and Sexual Activity    Alcohol use: No     Comment: social    Drug use: No    Sexual activity: Not on file   Lifestyle    Musculoskeletal: Negative for arthralgias, joint swelling and myalgias. Skin: Negative for rash. Allergic/Immunologic: Negative for environmental allergies and food allergies. Neurological: Negative for dizziness, tremors, weakness and headaches. Psychiatric/Behavioral: Negative for behavioral problems and sleep disturbance.         :   There were no vitals filed for this visit. Wt Readings from Last 3 Encounters:   09/28/20 172 lb 12.8 oz (78.4 kg)   02/11/20 150 lb 4.8 oz (68.2 kg)   01/21/20 149 lb (67.6 kg)         Physical Exam phone visit    Current Outpatient Medications   Medication Sig Dispense Refill    alendronate (FOSAMAX) 70 MG tablet Take 1 tablet by mouth every 7 days 12 tablet 3    donepezil (ARICEPT) 5 MG tablet Take 1 tablet by mouth nightly 30 tablet 3    umeclidinium-vilanterol (ANORO ELLIPTA) 62.5-25 MCG/INH AEPB inhaler Inhale 1 puff into the lungs daily 30 puff 3    escitalopram (LEXAPRO) 10 MG tablet Take 1 tablet by mouth daily 90 tablet 1    fluticasone (FLONASE) 50 MCG/ACT nasal spray 2 sprays by Nasal route daily 1 Bottle 5    calcium carbonate 648 MG TABS Take 1 tablet by mouth 2 times daily 60 tablet 11    amLODIPine (NORVASC) 5 MG tablet TAKE 1 TABLET BY MOUTH DAILY. 90 tablet 4    atorvastatin (LIPITOR) 20 MG tablet Take 1 tablet by mouth daily 90 tablet 4    OXYGEN Start oxygen therapy at 2 lit with activity and sleep , give POC for ambulation     Dx COPD 1 Units 0    albuterol sulfate  (90 Base) MCG/ACT inhaler Inhale 2 puffs into the lungs every morning (before breakfast) 1 Inhaler 3    aspirin 81 MG EC tablet Take 1 tablet by mouth daily 30 tablet 3    Multiple Vitamins-Minerals (PRESERVISION AREDS 2) CAPS Take 1 capsule by mouth daily (Patient not taking: Reported on 9/28/2020) 90 capsule 3    coenzyme Q10 100 MG CAPS capsule Take 100 mg by mouth daily      Ascorbic Acid (VITAMIN C) 500 MG tablet Take 500 mg by mouth daily.         vitamin D (CHOLECALCIFEROL) 1000 UNIT TABS tablet Take 1,000 Int'l Units by mouth daily. No current facility-administered medications for this visit. No results found for this or any previous visit.]  Results for orders placed during the hospital encounter of 11/12/19   XR CHEST PORTABLE    Narrative XR CHEST PORTABLE    Exam Date/Time:  11/12/2019 3:30 PM  Clinical History:   CVA   Comparison:  7/20/2014      RESULT:     Lines, tubes, and devices:  None. Lungs and pleura:  No consolidation. No pleural effusion. No pneumothorax. Normal pulmonary vascular pattern. Cardiomediastinal silhouette:  Normal. Aortic atherosclerotic calcification. Other:  No acute osseous findings. Impression No acute radiographic abnormality. Assessment/Plan:     1. Chronic obstructive pulmonary disease, unspecified COPD type ([address]  She is  currently on bronchodilator with Albuterol HFA prn bases, Anoro Ellipta 1 puff daily. C/o shortness of breath  with exertion. No Wheezing. No Cough with Sputum. No significant change in short of breath. Recommend to continue bronchodilator therapy as before    2. Hypoxia  She  is using 2 lit with with sleep and prn during daytime. O2 to keep saturation 90% above. Patient notified that this is a billable service and has given verbal consent for telehealth services. Time spent with patient 15 minutes. Return in about 4 months (around 3/24/2021) for COPD, hypoxia on O2.       Colton Moreno MD

## 2020-11-30 PROBLEM — R06.09 DYSPNEA ON EXERTION: Chronic | Status: RESOLVED | Noted: 2019-07-31 | Resolved: 2020-01-01

## 2020-11-30 PROBLEM — G31.84 MILD COGNITIVE IMPAIRMENT WITH MEMORY LOSS: Status: RESOLVED | Noted: 2020-01-01 | Resolved: 2020-01-01

## 2020-11-30 NOTE — PROGRESS NOTES
No abdominal pain, nausea, vomiting, diarrhea, constipation, bloody stools, black tarry stools. No rashes. No swollen glands. Prior to Visit Medications    Medication Sig Taking? Authorizing Provider   alendronate (FOSAMAX) 70 MG tablet Take 1 tablet by mouth every 7 days Yes Sivan Arroyo MD   donepezil (ARICEPT) 5 MG tablet Take 1 tablet by mouth nightly Yes Gage Sandy MD   umeclidinium-vilanterol (ANORO ELLIPTA) 62.5-25 MCG/INH AEPB inhaler Inhale 1 puff into the lungs daily Yes Priscila Langley MD   escitalopram (LEXAPRO) 10 MG tablet Take 1 tablet by mouth daily Yes Sivan Arroyo MD   fluticasone (FLONASE) 50 MCG/ACT nasal spray 2 sprays by Nasal route daily Yes Priscila Langley MD   calcium carbonate 648 MG TABS Take 1 tablet by mouth 2 times daily Yes Sivan Arroyo MD   amLODIPine (NORVASC) 5 MG tablet TAKE 1 TABLET BY MOUTH DAILY. Yes Sivan Arroyo MD   atorvastatin (LIPITOR) 20 MG tablet Take 1 tablet by mouth daily Yes Sivan Arroyo MD   OXYGEN Start oxygen therapy at 2 lit with activity and sleep , give POC for ambulation     Dx COPD Yes Priscila Langley MD   albuterol sulfate  (90 Base) MCG/ACT inhaler Inhale 2 puffs into the lungs every morning (before breakfast) Yes Kassie Barragan, DO   aspirin 81 MG EC tablet Take 1 tablet by mouth daily Yes Yasmin Yang, DO   Multiple Vitamins-Minerals (PRESERVISION AREDS 2) CAPS Take 1 capsule by mouth daily Yes Sivan Arroyo MD   coenzyme Q10 100 MG CAPS capsule Take 100 mg by mouth daily Yes Historical Provider, MD   Ascorbic Acid (VITAMIN C) 500 MG tablet Take 500 mg by mouth daily. Yes Historical Provider, MD   vitamin D (CHOLECALCIFEROL) 1000 UNIT TABS tablet Take 1,000 Int'l Units by mouth daily.    Yes Historical Provider, MD       Social History     Tobacco Use    Smoking status: Former Smoker     Packs/day: 0.75     Years: 45.00     Pack years: 33.75 Types: Cigarettes     Start date: 5     Last attempt to quit: 2019     Years since quittin.8    Smokeless tobacco: Never Used   Substance Use Topics    Alcohol use: No     Comment: social    Drug use: No        No Known Allergies,   Past Medical History:   Diagnosis Date    COPD (chronic obstructive pulmonary disease) (HCC)     Dyspnea on exertion 2019    Hypertension     Impaired cognition     Mild cognitive impairment with memory loss 2020    Osteoarthritis     Tobacco abuse 2019    Tobacco use disorder 2019    Vascular dementia ([address] 2020   ,   Past Surgical History:   Procedure Laterality Date    EYE SURGERY  2010    PER PROBLEM SHEET    HYSTERECTOMY  1975    PER PROBLEM SHEET    LITHOTRIPSY  2004    PER PROBLEM SHEET    VEIN SURGERY  1988    PER PROBLEM SHEET   ,   Social History     Tobacco Use    Smoking status: Former Smoker     Packs/day: 0.75     Years: 45.00     Pack years: 33.75     Types: Cigarettes     Start date: 1967     Last attempt to quit: 2019     Years since quittin.8    Smokeless tobacco: Never Used   Substance Use Topics    Alcohol use: No     Comment: social    Drug use: No   ,   Family History   Problem Relation Age of Onset    Diabetes Other         GRANDMOTHER       PHYSICAL EXAMINATION:  [ INSTRUCTIONS:  \"[x]\" Indicates a positive item  \"[]\" Indicates a negative item  -- DELETE ALL ITEMS NOT EXAMINED]  Vital Signs: unavailable    Patient appears to be alert and oriented to person, place, time, situation and is in no acute distress. Respiratory effort appears normal. Mood appears stable and speech and thought are grossly normal.    ASSESSMENT/PLAN:  Krys Garcia was seen today for abdominal pain and health maintenance. Diagnoses and all orders for this visit:    Generalized abdominal pain  Comments:  Resassuring that sx have resolved. Patient feels very well. CAll or to ER for recurrent/persistent pain and bloating. Arthropathy of both hips  Comments:  Improved significantly. Vascular dementia without behavioral disturbance ([facility]  Comments:  Stable and well-controlled. Has given up driving. Essential (primary) hypertension  -     Comprehensive Metabolic Panel; Future  -     CBC With Auto Differential; Future    Cerebrovascular accident (CVA) due to stenosis of left anterior cerebral artery (HCC)  -     Hemoglobin A1C; Future  -     Lipid, Fasting; Future  -     Comprehensive Metabolic Panel; Future  -     CBC With Auto Differential; Future    Abnormal finding of blood chemistry, unspecified   -     Hemoglobin A1C; Future        Return for appt is Dec 22. Kandy Nielsen is a 68 y.o. female being evaluated by a Virtual Visit (telephonic visit) encounter to address concerns as mentioned above. A caregiver was present when appropriate. Due to this being a TeleHealth encounter (During PDIJK-85 public health emergency), evaluation of the following organ systems was limited: Vitals/Constitutional/EENT/Resp/CV/GI//MS/Neuro/Skin/Heme-Lymph-Imm. Pursuant to the emergency declaration under the [address] [place] [address] and the [employer] Act, this Virtual Visit was conducted with patient's (and/or legal guardian's) consent, to reduce the patient's risk of exposure to COVID-19 and provide necessary medical care. The patient (and/or legal guardian) has also been advised to contact this office for worsening conditions or problems, and seek emergency medical treatment and/or call 911 if deemed necessary. Services were provided through a telephonic synchronous discussion virtually to substitute for in-person clinic visit. Patient and provider were located at their individual homes. --Joe Lobato MD on 11/30/2020 at 3:05 PM    An electronic signature was used to authenticate this note.

## 2020-12-07 NOTE — TELEPHONE ENCOUNTER
PATIENT IS OUT OF SAMPLES OF ANORO, SHE DOES NOT HAVE DRUG BENEFITS ON HER INSURANCE UNTIL THE FIRST OF THE YEAR. WE DO NOT HAVE ANY SAMPLES OF ANORO, PATIENT IS WONDERING IF THERE IS ANOTHER SAMPLE YOU CAN GIVE HER FOR NOW. PLEASE ADVISE.

## 2020-12-22 PROBLEM — K58.9 IRRITABLE BOWEL SYNDROME WITHOUT DIARRHEA: Chronic | Status: ACTIVE | Noted: 2020-01-01

## 2020-12-22 PROBLEM — R26.0 ATAXIC GAIT: Status: RESOLVED | Noted: 2019-11-14 | Resolved: 2020-01-01

## 2020-12-22 NOTE — PROGRESS NOTES
Aye Vela is a 68 y.o. female evaluated via telephone on 2020. Consent:  She and/or health care decision maker is aware that that she may receive a bill for this telephone service, depending on her insurance coverage, and has provided verbal consent to proceed: Yes      Documentation:  I communicated with the patient and/or health care decision maker about see below. Details of this discussion including any medical advice provided: see below      I affirm this is a Patient Initiated Episode with an Established Patient who has not had a related appointment within my department in the past 7 days or scheduled within the next 24 hours. Total Time: minutes: 11-20 minutes    Note: not billable if this call serves to triage the patient into an appointment for the relevant concern      Norbert YANCEY     2020    TELEHEALTH EVALUATION -- Audio (During [facility]- public health emergency)    HPI:    Aye Vela (:  1940) has requested an audio evaluation for the following concern(s):    Dementia (follow-up)    Patient is here for f/u HTN. Is compliant with meds and has no side effects from them. Avoids added salt. Tries to eat healthy. Exercises occasionally. Has no chest pain, shortness of breath, palpitations or edema. CVA. No new deficits. No facial droop. No speech impairment. No unilateral weakness. Acknowledges word-finding ability. COPD. On Anoro, O2 QHS and prn  and prn albuterol - less than 3 times a week. No significant cough, mild baseline shortness of breath, occasional wheezing, mild, occasional chest tightness. Patient is being treated for anxiety and has been compliant with meds which do not cause side effects. Mood is improved. No suicidal ideation. Sleep is normal.    GI upset. Has low abdominal pain after eating toast in the AM. Relieved with BM of normal stool without blood or black color. Dementia - Still unable to recall name of illness causing pandemic but is able to recall how many days her son has been out of work because of it. Wonders about [employer]. Review of Systems see above    Prior to Visit Medications    Medication Sig Taking? Authorizing Provider   alendronate (FOSAMAX) 70 MG tablet Take 1 tablet by mouth every 7 days Yes Ewelina Miller MD   escitalopram (LEXAPRO) 10 MG tablet Take 1 tablet by mouth daily Yes Ewelina Miller MD   calcium carbonate 648 MG TABS Take 1 tablet by mouth 2 times daily Yes wEelina Miller MD   amLODIPine (NORVASC) 5 MG tablet TAKE 1 TABLET BY MOUTH DAILY. Yes Ewelina Miller MD   atorvastatin (LIPITOR) 20 MG tablet Take 1 tablet by mouth daily Yes Ewelina Miller MD   donepezil (ARICEPT) 5 MG tablet Take 1 tablet by mouth nightly Yes Ewelina Miller MD   fluticasone (FLONASE) 50 MCG/ACT nasal spray 2 sprays by Nasal route daily Yes Ewelina Miller MD   umeclidinium-vilanterol (ANORO ELLIPTA) 62.5-25 MCG/INH AEPB inhaler Inhale 1 puff into the lungs daily Yes Leanna Pickard MD   OXYGEN Start oxygen therapy at 2 lit with activity and sleep , give POC for ambulation     Dx COPD Yes Leanna Pickard MD   albuterol sulfate  (90 Base) MCG/ACT inhaler Inhale 2 puffs into the lungs every morning (before breakfast) Yes Marichuy Nathan, DO   aspirin 81 MG EC tablet Take 1 tablet by mouth daily Yes Yasmin Yang, DO   Multiple Vitamins-Minerals (PRESERVISION AREDS 2) CAPS Take 1 capsule by mouth daily Yes Ewelina Miller MD   coenzyme Q10 100 MG CAPS capsule Take 100 mg by mouth daily Yes Historical Provider, MD   Ascorbic Acid (VITAMIN C) 500 MG tablet Take 500 mg by mouth daily. Yes Historical Provider, MD   vitamin D (CHOLECALCIFEROL) 1000 UNIT TABS tablet Take 1,000 Int'l Units by mouth daily.    Yes Historical Provider, MD       Social History stable and well-controlled on current meds  Orders:  -     atorvastatin (LIPITOR) 20 MG tablet; Take 1 tablet by mouth daily    Essential (primary) hypertension  Comments:  stable and well-controlled on current meds    Chronic obstructive pulmonary disease, unspecified COPD type ([facility]  Comments:  stable and fair control on current meds    Hypoxia  Comments:  stable and fair control on current O2    Vascular dementia without behavioral disturbance ([facility]  Comments:  stable and well-control on current meds  Orders:  -     donepezil (ARICEPT) 5 MG tablet; Take 1 tablet by mouth nightly    Anxiety  Comments:  stable and with good control on current meds  Orders:  -     escitalopram (LEXAPRO) 10 MG tablet; Take 1 tablet by mouth daily    Osteopenia of multiple sites  Comments:  Bordering on osteoporosis. Start Fosamax 70 mg weekly and add calcium 650 mg twice daily to her current vitamin D regimen. Repeat DEXA 1 year  Orders:  -     alendronate (FOSAMAX) 70 MG tablet; Take 1 tablet by mouth every 7 days  -     calcium carbonate 648 MG TABS; Take 1 tablet by mouth 2 times daily    HTN (hypertension), benign  Comments:  poor control, worse; suspect white coat syndrome; pt states that she is very nervous today; follow at home then recheck in 1 month; consider ACE or HCTZ  Orders:  -     amLODIPine (NORVASC) 5 MG tablet; TAKE 1 TABLET BY MOUTH DAILY. Post-nasal drip  Comments:  Flonase has helped with the symptoms in the past.  Resume that. Orders:  -     fluticasone (FLONASE) 50 MCG/ACT nasal spray; 2 sprays by Nasal route daily    Reminded to have labs drawn. Ordered 11/30/2020,    Discussed importance of COVID immunization. Return in about 4 months (around 4/22/2021) for HTN, Mood Disorder, COPD, CVA - VV. Thomas Pacheco is a 68 y.o. female being evaluated by a Virtual Visit (telephonic visit) encounter to address concerns as mentioned above. A caregiver was present when appropriate. Due to this being a TeleHealth encounter (During FOVJV-96 public health emergency), evaluation of the following organ systems was limited: Vitals/Constitutional/EENT/Resp/CV/GI//MS/Neuro/Skin/Heme-Lymph-Imm. Pursuant to the emergency declaration under the [address] [place] [address] and the [employer] Act, this Virtual Visit was conducted with patient's (and/or legal guardian's) consent, to reduce the patient's risk of exposure to COVID-19 and provide necessary medical care. The patient (and/or legal guardian) has also been advised to contact this office for worsening conditions or problems, and seek emergency medical treatment and/or call 911 if deemed necessary. Services were provided through a telephonic synchronous discussion virtually to substitute for in-person clinic visit. Patient and provider were located at their individual homes. --William Gutierrez MD on 12/22/2020 at 12:42 PM    An electronic signature was used to authenticate this note.

## 2021-01-01 ENCOUNTER — TELEPHONE (OUTPATIENT)
Dept: NEUROLOGY | Age: 81
End: 2021-01-01

## 2021-01-01 ENCOUNTER — OFFICE VISIT (OUTPATIENT)
Dept: FAMILY MEDICINE CLINIC | Age: 81
End: 2021-01-01
Payer: MEDICARE

## 2021-01-01 ENCOUNTER — ANESTHESIA EVENT (OUTPATIENT)
Dept: OPERATING ROOM | Age: 81
End: 2021-01-01
Payer: MEDICARE

## 2021-01-01 ENCOUNTER — OFFICE VISIT (OUTPATIENT)
Dept: GASTROENTEROLOGY | Age: 81
End: 2021-01-01
Payer: MEDICARE

## 2021-01-01 ENCOUNTER — PATIENT MESSAGE (OUTPATIENT)
Dept: FAMILY MEDICINE CLINIC | Age: 81
End: 2021-01-01

## 2021-01-01 ENCOUNTER — HOSPITAL ENCOUNTER (OUTPATIENT)
Age: 81
Discharge: HOME | End: 2021-06-26
Attending: ORTHOPAEDIC SURGERY | Admitting: ORTHOPAEDIC SURGERY
Payer: MEDICARE

## 2021-01-01 ENCOUNTER — CARE COORDINATION (OUTPATIENT)
Dept: CASE MANAGEMENT | Age: 81
End: 2021-01-01

## 2021-01-01 ENCOUNTER — TELEPHONE (OUTPATIENT)
Dept: FAMILY MEDICINE CLINIC | Age: 81
End: 2021-01-01

## 2021-01-01 ENCOUNTER — OFFICE VISIT (OUTPATIENT)
Dept: PULMONOLOGY | Age: 81
End: 2021-01-01
Payer: MEDICARE

## 2021-01-01 ENCOUNTER — VIRTUAL VISIT (OUTPATIENT)
Dept: FAMILY MEDICINE CLINIC | Age: 81
End: 2021-01-01
Payer: MEDICARE

## 2021-01-01 ENCOUNTER — ANESTHESIA (OUTPATIENT)
Dept: OPERATING ROOM | Age: 81
End: 2021-01-01
Payer: MEDICARE

## 2021-01-01 ENCOUNTER — PATIENT MESSAGE (OUTPATIENT)
Dept: PULMONOLOGY | Age: 81
End: 2021-01-01

## 2021-01-01 ENCOUNTER — APPOINTMENT (OUTPATIENT)
Dept: CT IMAGING | Age: 81
DRG: 281 | End: 2021-01-01
Payer: MEDICARE

## 2021-01-01 ENCOUNTER — HOSPITAL ENCOUNTER (INPATIENT)
Age: 81
LOS: 3 days | Discharge: HOME | DRG: 392 | End: 2021-07-16
Attending: STUDENT IN AN ORGANIZED HEALTH CARE EDUCATION/TRAINING PROGRAM | Admitting: INTERNAL MEDICINE
Payer: MEDICARE

## 2021-01-01 ENCOUNTER — HOSPITAL ENCOUNTER (OUTPATIENT)
Dept: GENERAL RADIOLOGY | Age: 81
Discharge: HOME | End: 2021-06-06
Payer: MEDICARE

## 2021-01-01 ENCOUNTER — OFFICE VISIT (OUTPATIENT)
Dept: NEUROLOGY | Age: 81
End: 2021-01-01
Payer: MEDICARE

## 2021-01-01 ENCOUNTER — TELEPHONE (OUTPATIENT)
Dept: GASTROENTEROLOGY | Age: 81
End: 2021-01-01

## 2021-01-01 ENCOUNTER — HOSPITAL ENCOUNTER (OUTPATIENT)
Dept: GENERAL RADIOLOGY | Age: 81
Discharge: HOME | End: 2021-06-27
Attending: ORTHOPAEDIC SURGERY
Payer: MEDICARE

## 2021-01-01 ENCOUNTER — HOSPITAL ENCOUNTER (OUTPATIENT)
Dept: PULMONOLOGY | Age: 81
Discharge: HOME | End: 2021-06-17
Payer: MEDICARE

## 2021-01-01 ENCOUNTER — APPOINTMENT (OUTPATIENT)
Dept: GENERAL RADIOLOGY | Age: 81
End: 2021-01-01
Attending: ORTHOPAEDIC SURGERY
Payer: MEDICARE

## 2021-01-01 ENCOUNTER — HOSPITAL ENCOUNTER (INPATIENT)
Age: 81
LOS: 4 days | Discharge: HOME HEALTH | DRG: 281 | End: 2021-07-25
Attending: INTERNAL MEDICINE | Admitting: INTERNAL MEDICINE
Payer: MEDICARE

## 2021-01-01 ENCOUNTER — TELEPHONE (OUTPATIENT)
Dept: PULMONOLOGY | Age: 81
End: 2021-01-01

## 2021-01-01 ENCOUNTER — APPOINTMENT (OUTPATIENT)
Dept: CT IMAGING | Age: 81
DRG: 392 | End: 2021-01-01
Payer: MEDICARE

## 2021-01-01 VITALS
BODY MASS INDEX: 30.35 KG/M2 | DIASTOLIC BLOOD PRESSURE: 60 MMHG | SYSTOLIC BLOOD PRESSURE: 80 MMHG | OXYGEN SATURATION: 97 % | TEMPERATURE: 95.6 F | HEART RATE: 100 BPM | RESPIRATION RATE: 18 BRPM | HEIGHT: 60 IN | WEIGHT: 154.6 LBS

## 2021-01-01 VITALS
HEIGHT: 61 IN | WEIGHT: 170 LBS | HEART RATE: 75 BPM | OXYGEN SATURATION: 100 % | TEMPERATURE: 97.1 F | DIASTOLIC BLOOD PRESSURE: 55 MMHG | RESPIRATION RATE: 18 BRPM | OXYGEN SATURATION: 91 % | SYSTOLIC BLOOD PRESSURE: 90 MMHG | BODY MASS INDEX: 32.1 KG/M2 | DIASTOLIC BLOOD PRESSURE: 92 MMHG | SYSTOLIC BLOOD PRESSURE: 138 MMHG

## 2021-01-01 VITALS
TEMPERATURE: 98.2 F | BODY MASS INDEX: 31.8 KG/M2 | RESPIRATION RATE: 18 BRPM | DIASTOLIC BLOOD PRESSURE: 58 MMHG | HEART RATE: 75 BPM | WEIGHT: 162 LBS | HEIGHT: 60 IN | OXYGEN SATURATION: 100 % | SYSTOLIC BLOOD PRESSURE: 132 MMHG

## 2021-01-01 VITALS
BODY MASS INDEX: 31.8 KG/M2 | HEIGHT: 60 IN | WEIGHT: 162 LBS | DIASTOLIC BLOOD PRESSURE: 70 MMHG | TEMPERATURE: 97 F | SYSTOLIC BLOOD PRESSURE: 110 MMHG | OXYGEN SATURATION: 97 % | HEART RATE: 89 BPM

## 2021-01-01 VITALS
SYSTOLIC BLOOD PRESSURE: 124 MMHG | HEART RATE: 78 BPM | BODY MASS INDEX: 32 KG/M2 | HEIGHT: 60 IN | OXYGEN SATURATION: 77 % | TEMPERATURE: 98.1 F | WEIGHT: 163 LBS | DIASTOLIC BLOOD PRESSURE: 83 MMHG

## 2021-01-01 VITALS
TEMPERATURE: 97.5 F | OXYGEN SATURATION: 91 % | BODY MASS INDEX: 33.57 KG/M2 | HEART RATE: 70 BPM | RESPIRATION RATE: 24 BRPM | SYSTOLIC BLOOD PRESSURE: 110 MMHG | HEIGHT: 60 IN | WEIGHT: 171 LBS | DIASTOLIC BLOOD PRESSURE: 64 MMHG

## 2021-01-01 VITALS
DIASTOLIC BLOOD PRESSURE: 83 MMHG | HEIGHT: 60 IN | SYSTOLIC BLOOD PRESSURE: 125 MMHG | OXYGEN SATURATION: 91 % | RESPIRATION RATE: 20 BRPM | WEIGHT: 158 LBS | TEMPERATURE: 98.1 F | BODY MASS INDEX: 31.02 KG/M2 | HEART RATE: 69 BPM

## 2021-01-01 VITALS
OXYGEN SATURATION: 96 % | DIASTOLIC BLOOD PRESSURE: 60 MMHG | HEIGHT: 61 IN | BODY MASS INDEX: 30.58 KG/M2 | TEMPERATURE: 97.5 F | WEIGHT: 162 LBS | HEART RATE: 78 BPM | SYSTOLIC BLOOD PRESSURE: 100 MMHG

## 2021-01-01 VITALS
DIASTOLIC BLOOD PRESSURE: 62 MMHG | HEIGHT: 60 IN | TEMPERATURE: 96.2 F | SYSTOLIC BLOOD PRESSURE: 106 MMHG | BODY MASS INDEX: 30.98 KG/M2 | WEIGHT: 157.8 LBS | OXYGEN SATURATION: 95 % | HEART RATE: 61 BPM | RESPIRATION RATE: 17 BRPM

## 2021-01-01 VITALS
DIASTOLIC BLOOD PRESSURE: 86 MMHG | HEIGHT: 60 IN | SYSTOLIC BLOOD PRESSURE: 120 MMHG | WEIGHT: 162.2 LBS | HEART RATE: 107 BPM | OXYGEN SATURATION: 84 % | BODY MASS INDEX: 31.84 KG/M2 | TEMPERATURE: 96.3 F

## 2021-01-01 VITALS
BODY MASS INDEX: 31.41 KG/M2 | HEIGHT: 60 IN | WEIGHT: 160 LBS | RESPIRATION RATE: 16 BRPM | OXYGEN SATURATION: 98 % | SYSTOLIC BLOOD PRESSURE: 96 MMHG | DIASTOLIC BLOOD PRESSURE: 60 MMHG | TEMPERATURE: 95.7 F | HEART RATE: 79 BPM

## 2021-01-01 VITALS
RESPIRATION RATE: 16 BRPM | WEIGHT: 155.6 LBS | HEART RATE: 78 BPM | DIASTOLIC BLOOD PRESSURE: 50 MMHG | SYSTOLIC BLOOD PRESSURE: 80 MMHG | HEIGHT: 60 IN | TEMPERATURE: 96.7 F | BODY MASS INDEX: 30.55 KG/M2 | OXYGEN SATURATION: 90 %

## 2021-01-01 VITALS — WEIGHT: 157 LBS | OXYGEN SATURATION: 99 % | BODY MASS INDEX: 30.82 KG/M2 | HEART RATE: 75 BPM | HEIGHT: 60 IN

## 2021-01-01 VITALS
SYSTOLIC BLOOD PRESSURE: 122 MMHG | BODY MASS INDEX: 32.03 KG/M2 | DIASTOLIC BLOOD PRESSURE: 88 MMHG | HEART RATE: 74 BPM | WEIGHT: 164 LBS

## 2021-01-01 VITALS
DIASTOLIC BLOOD PRESSURE: 68 MMHG | TEMPERATURE: 97.5 F | BODY MASS INDEX: 32.31 KG/M2 | OXYGEN SATURATION: 91 % | SYSTOLIC BLOOD PRESSURE: 110 MMHG | WEIGHT: 171 LBS | RESPIRATION RATE: 24 BRPM | HEART RATE: 70 BPM

## 2021-01-01 VITALS
OXYGEN SATURATION: 100 % | RESPIRATION RATE: 18 BRPM | HEIGHT: 60 IN | TEMPERATURE: 97.9 F | WEIGHT: 160.72 LBS | HEART RATE: 65 BPM | BODY MASS INDEX: 31.55 KG/M2 | DIASTOLIC BLOOD PRESSURE: 82 MMHG | SYSTOLIC BLOOD PRESSURE: 123 MMHG

## 2021-01-01 DIAGNOSIS — R09.02 HYPOXIA: ICD-10-CM

## 2021-01-01 DIAGNOSIS — I63.522 CEREBROVASCULAR ACCIDENT (CVA) DUE TO STENOSIS OF LEFT ANTERIOR CEREBRAL ARTERY (HCC): Primary | ICD-10-CM

## 2021-01-01 DIAGNOSIS — N30.00 ACUTE CYSTITIS WITHOUT HEMATURIA: ICD-10-CM

## 2021-01-01 DIAGNOSIS — I95.9 HYPOTENSION, UNSPECIFIED HYPOTENSION TYPE: Chronic | ICD-10-CM

## 2021-01-01 DIAGNOSIS — I10 ESSENTIAL (PRIMARY) HYPERTENSION: ICD-10-CM

## 2021-01-01 DIAGNOSIS — K52.9 COLITIS: ICD-10-CM

## 2021-01-01 DIAGNOSIS — Z01.818 PRE-OP EXAM: ICD-10-CM

## 2021-01-01 DIAGNOSIS — E86.0 DEHYDRATION: ICD-10-CM

## 2021-01-01 DIAGNOSIS — D64.9 ANEMIA, UNSPECIFIED TYPE: ICD-10-CM

## 2021-01-01 DIAGNOSIS — Z96.642 STATUS POST TOTAL HIP REPLACEMENT, LEFT: ICD-10-CM

## 2021-01-01 DIAGNOSIS — F41.9 ANXIETY: Chronic | ICD-10-CM

## 2021-01-01 DIAGNOSIS — R11.2 NON-INTRACTABLE VOMITING WITH NAUSEA, UNSPECIFIED VOMITING TYPE: Primary | ICD-10-CM

## 2021-01-01 DIAGNOSIS — R52 PAIN: ICD-10-CM

## 2021-01-01 DIAGNOSIS — I95.9 TRANSIENT HYPOTENSION: ICD-10-CM

## 2021-01-01 DIAGNOSIS — I10 ESSENTIAL (PRIMARY) HYPERTENSION: Primary | ICD-10-CM

## 2021-01-01 DIAGNOSIS — I10 HTN (HYPERTENSION), BENIGN: ICD-10-CM

## 2021-01-01 DIAGNOSIS — M85.89 OSTEOPENIA OF MULTIPLE SITES: Chronic | ICD-10-CM

## 2021-01-01 DIAGNOSIS — I63.522 CEREBROVASCULAR ACCIDENT (CVA) DUE TO STENOSIS OF LEFT ANTERIOR CEREBRAL ARTERY (HCC): ICD-10-CM

## 2021-01-01 DIAGNOSIS — J44.9 CHRONIC OBSTRUCTIVE PULMONARY DISEASE, UNSPECIFIED COPD TYPE (HCC): ICD-10-CM

## 2021-01-01 DIAGNOSIS — R63.8 POOR FLUID INTAKE: ICD-10-CM

## 2021-01-01 DIAGNOSIS — I95.9 HYPOTENSION, UNSPECIFIED HYPOTENSION TYPE: ICD-10-CM

## 2021-01-01 DIAGNOSIS — I95.9 HYPOTENSION, UNSPECIFIED HYPOTENSION TYPE: Primary | Chronic | ICD-10-CM

## 2021-01-01 DIAGNOSIS — F01.50 VASCULAR DEMENTIA WITHOUT BEHAVIORAL DISTURBANCE (HCC): Chronic | ICD-10-CM

## 2021-01-01 DIAGNOSIS — K57.92 DIVERTICULITIS: Primary | ICD-10-CM

## 2021-01-01 DIAGNOSIS — J44.9 CHRONIC OBSTRUCTIVE PULMONARY DISEASE, UNSPECIFIED COPD TYPE (HCC): Primary | ICD-10-CM

## 2021-01-01 DIAGNOSIS — G89.18 POST-OP PAIN: Primary | ICD-10-CM

## 2021-01-01 DIAGNOSIS — E86.0 SEVERE DEHYDRATION: ICD-10-CM

## 2021-01-01 DIAGNOSIS — R27.0 ATAXIA: ICD-10-CM

## 2021-01-01 DIAGNOSIS — E66.9 OBESITY (BMI 30-39.9): ICD-10-CM

## 2021-01-01 DIAGNOSIS — R09.82 POST-NASAL DRIP: Chronic | ICD-10-CM

## 2021-01-01 DIAGNOSIS — Z01.811 ENCOUNTER FOR PRE-OPERATIVE RESPIRATORY CLEARANCE: Primary | ICD-10-CM

## 2021-01-01 DIAGNOSIS — F01.50 VASCULAR DEMENTIA WITHOUT BEHAVIORAL DISTURBANCE (HCC): ICD-10-CM

## 2021-01-01 DIAGNOSIS — R07.9 CHEST PAIN, UNSPECIFIED TYPE: ICD-10-CM

## 2021-01-01 DIAGNOSIS — Z01.811 ENCOUNTER FOR PRE-OPERATIVE RESPIRATORY CLEARANCE: ICD-10-CM

## 2021-01-01 DIAGNOSIS — K57.92 ACUTE DIVERTICULITIS: ICD-10-CM

## 2021-01-01 DIAGNOSIS — Z87.891 PERSONAL HISTORY OF NICOTINE DEPENDENCE: ICD-10-CM

## 2021-01-01 DIAGNOSIS — Z00.00 ROUTINE GENERAL MEDICAL EXAMINATION AT A HEALTH CARE FACILITY: Primary | ICD-10-CM

## 2021-01-01 DIAGNOSIS — Z86.79 HISTORY OF ATRIAL FIBRILLATION: ICD-10-CM

## 2021-01-01 DIAGNOSIS — D64.9 ANEMIA, UNSPECIFIED TYPE: Primary | ICD-10-CM

## 2021-01-01 DIAGNOSIS — D64.9 ANEMIA, UNSPECIFIED TYPE: Chronic | ICD-10-CM

## 2021-01-01 DIAGNOSIS — M16.12 PRIMARY OSTEOARTHRITIS OF LEFT HIP: ICD-10-CM

## 2021-01-01 DIAGNOSIS — M16.0 ARTHROPATHY OF BOTH HIPS: ICD-10-CM

## 2021-01-01 DIAGNOSIS — K64.9 HEMORRHOIDS, UNSPECIFIED HEMORRHOID TYPE: ICD-10-CM

## 2021-01-01 DIAGNOSIS — K57.92 ACUTE DIVERTICULITIS: Primary | ICD-10-CM

## 2021-01-01 DIAGNOSIS — I48.19 PERSISTENT ATRIAL FIBRILLATION (HCC): Chronic | ICD-10-CM

## 2021-01-01 DIAGNOSIS — R06.09 DYSPNEA ON EXERTION: ICD-10-CM

## 2021-01-01 DIAGNOSIS — A08.4 VIRAL GASTROENTERITIS: Primary | ICD-10-CM

## 2021-01-01 DIAGNOSIS — R10.32 LEFT LOWER QUADRANT ABDOMINAL PAIN: ICD-10-CM

## 2021-01-01 DIAGNOSIS — R77.8 ELEVATED TROPONIN: Primary | ICD-10-CM

## 2021-01-01 LAB
ABO/RH: NORMAL
ALBUMIN SERPL-MCNC: 2.7 G/DL (ref 3.5–4.6)
ALBUMIN SERPL-MCNC: 3.3 G/DL (ref 3.5–4.6)
ALBUMIN SERPL-MCNC: 3.5 G/DL (ref 3.5–4.6)
ALP BLD-CCNC: 133 U/L (ref 40–130)
ALP BLD-CCNC: 146 U/L (ref 40–130)
ALT SERPL-CCNC: 14 U/L (ref 0–33)
ALT SERPL-CCNC: 8 U/L (ref 0–33)
ANION GAP SERPL CALCULATED.3IONS-SCNC: 10 MEQ/L (ref 9–15)
ANION GAP SERPL CALCULATED.3IONS-SCNC: 10 MEQ/L (ref 9–15)
ANION GAP SERPL CALCULATED.3IONS-SCNC: 11 MEQ/L (ref 9–15)
ANION GAP SERPL CALCULATED.3IONS-SCNC: 11 MEQ/L (ref 9–15)
ANION GAP SERPL CALCULATED.3IONS-SCNC: 16 MEQ/L (ref 9–15)
ANION GAP SERPL CALCULATED.3IONS-SCNC: 6 MEQ/L (ref 9–15)
ANION GAP SERPL CALCULATED.3IONS-SCNC: 6 MEQ/L (ref 9–15)
ANION GAP SERPL CALCULATED.3IONS-SCNC: 7 MEQ/L (ref 9–15)
ANION GAP SERPL CALCULATED.3IONS-SCNC: 8 MEQ/L (ref 9–15)
ANION GAP SERPL CALCULATED.3IONS-SCNC: 9 MEQ/L (ref 9–15)
ANION GAP SERPL CALCULATED.3IONS-SCNC: 9 MEQ/L (ref 9–15)
ANISOCYTOSIS: ABNORMAL
ANISOCYTOSIS: ABNORMAL
ANTIBODY SCREEN: NORMAL
APTT: 27.3 SEC (ref 24.4–36.8)
AST SERPL-CCNC: 18 U/L (ref 0–35)
AST SERPL-CCNC: 25 U/L (ref 0–35)
BACTERIA: NEGATIVE /HPF
BANDED NEUTROPHILS RELATIVE PERCENT: 3 % (ref 5–11)
BASOPHILS ABSOLUTE: 0 K/UL (ref 0–0.2)
BASOPHILS ABSOLUTE: 0 K/UL (ref 0–0.2)
BASOPHILS ABSOLUTE: 0.1 K/UL (ref 0–0.2)
BASOPHILS RELATIVE PERCENT: 0.6 %
BASOPHILS RELATIVE PERCENT: 0.6 %
BASOPHILS RELATIVE PERCENT: 0.7 %
BASOPHILS RELATIVE PERCENT: 0.8 %
BASOPHILS RELATIVE PERCENT: 0.9 %
BASOPHILS RELATIVE PERCENT: 1 %
BASOPHILS RELATIVE PERCENT: 1 %
BASOPHILS RELATIVE PERCENT: 1.1 %
BASOPHILS RELATIVE PERCENT: 1.1 %
BASOPHILS RELATIVE PERCENT: 1.3 %
BASOPHILS RELATIVE PERCENT: 1.5 %
BILIRUB SERPL-MCNC: 0.3 MG/DL (ref 0.2–0.7)
BILIRUB SERPL-MCNC: 0.5 MG/DL (ref 0.2–0.7)
BILIRUBIN URINE: NEGATIVE
BILIRUBIN URINE: NEGATIVE
BILIRUBIN, POC: NORMAL
BLOOD CULTURE, ROUTINE: NORMAL
BLOOD URINE, POC: NORMAL
BLOOD, URINE: NEGATIVE
BLOOD, URINE: NEGATIVE
BUN BLDV-MCNC: 10 MG/DL (ref 8–23)
BUN BLDV-MCNC: 11 MG/DL (ref 8–23)
BUN BLDV-MCNC: 14 MG/DL (ref 8–23)
BUN BLDV-MCNC: 17 MG/DL (ref 8–23)
BUN BLDV-MCNC: 18 MG/DL (ref 8–23)
BUN BLDV-MCNC: 22 MG/DL (ref 8–23)
BUN BLDV-MCNC: 27 MG/DL (ref 8–23)
BUN BLDV-MCNC: 5 MG/DL (ref 8–23)
BUN BLDV-MCNC: 6 MG/DL (ref 8–23)
BUN BLDV-MCNC: 7 MG/DL (ref 8–23)
BUN BLDV-MCNC: 7 MG/DL (ref 8–23)
BURR CELLS: ABNORMAL
C-REACTIVE PROTEIN: 10.8 MG/L (ref 0–5)
CALCIUM SERPL-MCNC: 7.9 MG/DL (ref 8.5–9.9)
CALCIUM SERPL-MCNC: 7.9 MG/DL (ref 8.5–9.9)
CALCIUM SERPL-MCNC: 8 MG/DL (ref 8.5–9.9)
CALCIUM SERPL-MCNC: 8 MG/DL (ref 8.5–9.9)
CALCIUM SERPL-MCNC: 8.2 MG/DL (ref 8.5–9.9)
CALCIUM SERPL-MCNC: 8.4 MG/DL (ref 8.5–9.9)
CALCIUM SERPL-MCNC: 8.5 MG/DL (ref 8.5–9.9)
CALCIUM SERPL-MCNC: 8.5 MG/DL (ref 8.5–9.9)
CALCIUM SERPL-MCNC: 8.6 MG/DL (ref 8.5–9.9)
CALCIUM SERPL-MCNC: 8.6 MG/DL (ref 8.5–9.9)
CALCIUM SERPL-MCNC: 9 MG/DL (ref 8.5–9.9)
CALCIUM SERPL-MCNC: 9.2 MG/DL (ref 8.5–9.9)
CALCIUM SERPL-MCNC: 9.6 MG/DL (ref 8.5–9.9)
CHLORIDE BLD-SCNC: 102 MEQ/L (ref 95–107)
CHLORIDE BLD-SCNC: 103 MEQ/L (ref 95–107)
CHLORIDE BLD-SCNC: 105 MEQ/L (ref 95–107)
CHLORIDE BLD-SCNC: 106 MEQ/L (ref 95–107)
CHLORIDE BLD-SCNC: 107 MEQ/L (ref 95–107)
CHLORIDE BLD-SCNC: 107 MEQ/L (ref 95–107)
CHLORIDE BLD-SCNC: 108 MEQ/L (ref 95–107)
CHLORIDE BLD-SCNC: 109 MEQ/L (ref 95–107)
CHLORIDE BLD-SCNC: 110 MEQ/L (ref 95–107)
CHOLESTEROL, FASTING: 144 MG/DL (ref 0–199)
CHOLESTEROL, TOTAL: 102 MG/DL (ref 0–199)
CLARITY, POC: NORMAL
CLARITY: CLEAR
CLARITY: CLEAR
CO2: 24 MEQ/L (ref 20–31)
CO2: 25 MEQ/L (ref 20–31)
CO2: 25 MEQ/L (ref 20–31)
CO2: 26 MEQ/L (ref 20–31)
CO2: 27 MEQ/L (ref 20–31)
CO2: 28 MEQ/L (ref 20–31)
CO2: 28 MEQ/L (ref 20–31)
CO2: 29 MEQ/L (ref 20–31)
CO2: 31 MEQ/L (ref 20–31)
COLOR, POC: NORMAL
COLOR: YELLOW
COLOR: YELLOW
CREAT SERPL-MCNC: 0.45 MG/DL (ref 0.5–0.9)
CREAT SERPL-MCNC: 0.45 MG/DL (ref 0.5–0.9)
CREAT SERPL-MCNC: 0.5 MG/DL (ref 0.5–0.9)
CREAT SERPL-MCNC: 0.53 MG/DL (ref 0.5–0.9)
CREAT SERPL-MCNC: 0.54 MG/DL (ref 0.5–0.9)
CREAT SERPL-MCNC: 0.54 MG/DL (ref 0.5–0.9)
CREAT SERPL-MCNC: 0.55 MG/DL (ref 0.5–0.9)
CREAT SERPL-MCNC: 0.57 MG/DL (ref 0.5–0.9)
CREAT SERPL-MCNC: 0.63 MG/DL (ref 0.5–0.9)
CREAT SERPL-MCNC: 0.79 MG/DL (ref 0.5–0.9)
CREAT SERPL-MCNC: 0.84 MG/DL (ref 0.5–0.9)
CREAT SERPL-MCNC: 0.88 MG/DL (ref 0.5–0.9)
CREAT SERPL-MCNC: 1.03 MG/DL (ref 0.5–0.9)
CULTURE, BLOOD 2: NORMAL
EKG ATRIAL RATE: 119 BPM
EKG ATRIAL RATE: 68 BPM
EKG ATRIAL RATE: 71 BPM
EKG ATRIAL RATE: 76 BPM
EKG ATRIAL RATE: 77 BPM
EKG P AXIS: 67 DEGREES
EKG P AXIS: 76 DEGREES
EKG P AXIS: 82 DEGREES
EKG P AXIS: 87 DEGREES
EKG P-R INTERVAL: 138 MS
EKG P-R INTERVAL: 152 MS
EKG P-R INTERVAL: 156 MS
EKG P-R INTERVAL: 164 MS
EKG Q-T INTERVAL: 298 MS
EKG Q-T INTERVAL: 420 MS
EKG Q-T INTERVAL: 424 MS
EKG Q-T INTERVAL: 436 MS
EKG Q-T INTERVAL: 446 MS
EKG QRS DURATION: 58 MS
EKG QRS DURATION: 64 MS
EKG QRS DURATION: 68 MS
EKG QRS DURATION: 70 MS
EKG QRS DURATION: 78 MS
EKG QTC CALCULATION (BAZETT): 449 MS
EKG QTC CALCULATION (BAZETT): 473 MS
EKG QTC CALCULATION (BAZETT): 474 MS
EKG QTC CALCULATION (BAZETT): 475 MS
EKG QTC CALCULATION (BAZETT): 477 MS
EKG R AXIS: -23 DEGREES
EKG R AXIS: -24 DEGREES
EKG R AXIS: -29 DEGREES
EKG R AXIS: -30 DEGREES
EKG R AXIS: -36 DEGREES
EKG T AXIS: -58 DEGREES
EKG T AXIS: 27 DEGREES
EKG T AXIS: 47 DEGREES
EKG T AXIS: 53 DEGREES
EKG T AXIS: 55 DEGREES
EKG VENTRICULAR RATE: 137 BPM
EKG VENTRICULAR RATE: 68 BPM
EKG VENTRICULAR RATE: 71 BPM
EKG VENTRICULAR RATE: 76 BPM
EKG VENTRICULAR RATE: 77 BPM
EOSINOPHILS ABSOLUTE: 0 K/UL (ref 0–0.7)
EOSINOPHILS ABSOLUTE: 0.1 K/UL (ref 0–0.7)
EOSINOPHILS ABSOLUTE: 0.2 K/UL (ref 0–0.7)
EOSINOPHILS RELATIVE PERCENT: 0.1 %
EOSINOPHILS RELATIVE PERCENT: 0.9 %
EOSINOPHILS RELATIVE PERCENT: 1 %
EOSINOPHILS RELATIVE PERCENT: 1.1 %
EOSINOPHILS RELATIVE PERCENT: 1.6 %
EOSINOPHILS RELATIVE PERCENT: 2 %
EOSINOPHILS RELATIVE PERCENT: 2.1 %
EOSINOPHILS RELATIVE PERCENT: 2.4 %
EOSINOPHILS RELATIVE PERCENT: 2.4 %
EOSINOPHILS RELATIVE PERCENT: 2.9 %
EOSINOPHILS RELATIVE PERCENT: 2.9 %
EPITHELIAL CELLS, UA: NORMAL /HPF
FERRITIN: 225.7 NG/ML (ref 13–150)
FOLATE: 11.3 NG/ML (ref 7.3–26.1)
GFR AFRICAN AMERICAN: >60
GFR NON-AFRICAN AMERICAN: 51.7
GFR NON-AFRICAN AMERICAN: >60
GLOBULIN: 3.1 G/DL (ref 2.3–3.5)
GLOBULIN: 3.3 G/DL (ref 2.3–3.5)
GLUCOSE BLD-MCNC: 101 MG/DL (ref 70–99)
GLUCOSE BLD-MCNC: 112 MG/DL (ref 70–99)
GLUCOSE BLD-MCNC: 113 MG/DL (ref 70–99)
GLUCOSE BLD-MCNC: 117 MG/DL (ref 70–99)
GLUCOSE BLD-MCNC: 133 MG/DL (ref 70–99)
GLUCOSE BLD-MCNC: 133 MG/DL (ref 70–99)
GLUCOSE BLD-MCNC: 91 MG/DL (ref 70–99)
GLUCOSE BLD-MCNC: 92 MG/DL (ref 70–99)
GLUCOSE BLD-MCNC: 93 MG/DL (ref 70–99)
GLUCOSE BLD-MCNC: 93 MG/DL (ref 70–99)
GLUCOSE BLD-MCNC: 95 MG/DL (ref 70–99)
GLUCOSE BLD-MCNC: 96 MG/DL (ref 70–99)
GLUCOSE BLD-MCNC: 97 MG/DL (ref 70–99)
GLUCOSE BLD-MCNC: 98 MG/DL (ref 60–115)
GLUCOSE URINE, POC: NORMAL
GLUCOSE URINE: NEGATIVE MG/DL
GLUCOSE URINE: NEGATIVE MG/DL
HCT VFR BLD CALC: 23.8 % (ref 37–47)
HCT VFR BLD CALC: 24.3 % (ref 37–47)
HCT VFR BLD CALC: 25.5 % (ref 37–47)
HCT VFR BLD CALC: 26.5 % (ref 37–47)
HCT VFR BLD CALC: 26.9 % (ref 37–47)
HCT VFR BLD CALC: 27.7 % (ref 37–47)
HCT VFR BLD CALC: 28.1 % (ref 37–47)
HCT VFR BLD CALC: 28.1 % (ref 37–47)
HCT VFR BLD CALC: 28.9 % (ref 37–47)
HCT VFR BLD CALC: 29 % (ref 37–47)
HCT VFR BLD CALC: 31.5 % (ref 37–47)
HCT VFR BLD CALC: 38.3 % (ref 37–47)
HDLC SERPL-MCNC: 38 MG/DL (ref 40–59)
HDLC SERPL-MCNC: 59 MG/DL (ref 40–59)
HEMOGLOBIN: 10.3 G/DL (ref 12–16)
HEMOGLOBIN: 12.4 G/DL (ref 12–16)
HEMOGLOBIN: 7.8 G/DL (ref 12–16)
HEMOGLOBIN: 7.9 G/DL (ref 12–16)
HEMOGLOBIN: 8.1 G/DL (ref 12–16)
HEMOGLOBIN: 8.8 G/DL (ref 12–16)
HEMOGLOBIN: 8.8 G/DL (ref 12–16)
HEMOGLOBIN: 8.9 G/DL (ref 12–16)
HEMOGLOBIN: 8.9 G/DL (ref 12–16)
HEMOGLOBIN: 9.1 G/DL (ref 12–16)
HEMOGLOBIN: 9.3 G/DL (ref 12–16)
HEMOGLOBIN: 9.4 G/DL (ref 12–16)
INR BLD: 1.1
IRON SATURATION: 15 % (ref 11–46)
IRON: 23 UG/DL (ref 37–145)
KETONES, POC: NORMAL
KETONES, URINE: 15 MG/DL
KETONES, URINE: ABNORMAL MG/DL
LACTIC ACID: 1.1 MMOL/L (ref 0.5–2.2)
LACTIC ACID: 1.3 MMOL/L (ref 0.5–2.2)
LDL CHOLESTEROL CALCULATED: 41 MG/DL (ref 0–129)
LDL CHOLESTEROL CALCULATED: 64 MG/DL (ref 0–129)
LEUKOCYTE EST, POC: NORMAL
LEUKOCYTE ESTERASE, URINE: ABNORMAL
LEUKOCYTE ESTERASE, URINE: NEGATIVE
LIPASE: 18 U/L (ref 12–95)
LV EF: 68 %
LVEF MODALITY: NORMAL
LYMPHOCYTES ABSOLUTE: 0.5 K/UL (ref 1–4.8)
LYMPHOCYTES ABSOLUTE: 0.5 K/UL (ref 1–4.8)
LYMPHOCYTES ABSOLUTE: 0.6 K/UL (ref 1–4.8)
LYMPHOCYTES ABSOLUTE: 0.7 K/UL (ref 1–4.8)
LYMPHOCYTES ABSOLUTE: 0.7 K/UL (ref 1–4.8)
LYMPHOCYTES ABSOLUTE: 0.8 K/UL (ref 1–4.8)
LYMPHOCYTES ABSOLUTE: 0.8 K/UL (ref 1–4.8)
LYMPHOCYTES ABSOLUTE: 1.2 K/UL (ref 1–4.8)
LYMPHOCYTES RELATIVE PERCENT: 12.4 %
LYMPHOCYTES RELATIVE PERCENT: 12.9 %
LYMPHOCYTES RELATIVE PERCENT: 14.3 %
LYMPHOCYTES RELATIVE PERCENT: 16 %
LYMPHOCYTES RELATIVE PERCENT: 16.4 %
LYMPHOCYTES RELATIVE PERCENT: 17 %
LYMPHOCYTES RELATIVE PERCENT: 5.5 %
LYMPHOCYTES RELATIVE PERCENT: 8 %
LYMPHOCYTES RELATIVE PERCENT: 8.2 %
LYMPHOCYTES RELATIVE PERCENT: 8.5 %
LYMPHOCYTES RELATIVE PERCENT: 8.6 %
MAGNESIUM: 1.9 MG/DL (ref 1.7–2.4)
MAGNESIUM: 2 MG/DL (ref 1.7–2.4)
MAGNESIUM: 2.2 MG/DL (ref 1.7–2.4)
MCH RBC QN AUTO: 29.7 PG (ref 27–31.3)
MCH RBC QN AUTO: 29.8 PG (ref 27–31.3)
MCH RBC QN AUTO: 30.2 PG (ref 27–31.3)
MCH RBC QN AUTO: 30.4 PG (ref 27–31.3)
MCH RBC QN AUTO: 30.5 PG (ref 27–31.3)
MCH RBC QN AUTO: 30.6 PG (ref 27–31.3)
MCH RBC QN AUTO: 30.7 PG (ref 27–31.3)
MCH RBC QN AUTO: 30.8 PG (ref 27–31.3)
MCHC RBC AUTO-ENTMCNC: 31.7 % (ref 33–37)
MCHC RBC AUTO-ENTMCNC: 31.7 % (ref 33–37)
MCHC RBC AUTO-ENTMCNC: 31.9 % (ref 33–37)
MCHC RBC AUTO-ENTMCNC: 32.1 % (ref 33–37)
MCHC RBC AUTO-ENTMCNC: 32.2 % (ref 33–37)
MCHC RBC AUTO-ENTMCNC: 32.4 % (ref 33–37)
MCHC RBC AUTO-ENTMCNC: 32.6 % (ref 33–37)
MCHC RBC AUTO-ENTMCNC: 32.6 % (ref 33–37)
MCHC RBC AUTO-ENTMCNC: 32.7 % (ref 33–37)
MCHC RBC AUTO-ENTMCNC: 33 % (ref 33–37)
MCHC RBC AUTO-ENTMCNC: 33 % (ref 33–37)
MCHC RBC AUTO-ENTMCNC: 33.2 % (ref 33–37)
MCV RBC AUTO: 92.1 FL (ref 82–100)
MCV RBC AUTO: 92.6 FL (ref 82–100)
MCV RBC AUTO: 92.8 FL (ref 82–100)
MCV RBC AUTO: 93.1 FL (ref 82–100)
MCV RBC AUTO: 93.2 FL (ref 82–100)
MCV RBC AUTO: 93.3 FL (ref 82–100)
MCV RBC AUTO: 93.5 FL (ref 82–100)
MCV RBC AUTO: 94.2 FL (ref 82–100)
MCV RBC AUTO: 94.2 FL (ref 82–100)
MCV RBC AUTO: 94.5 FL (ref 82–100)
MCV RBC AUTO: 95.2 FL (ref 82–100)
MCV RBC AUTO: 95.2 FL (ref 82–100)
MICROCYTES: ABNORMAL
MONOCYTES ABSOLUTE: 0.4 K/UL (ref 0.2–0.8)
MONOCYTES ABSOLUTE: 0.5 K/UL (ref 0.2–0.8)
MONOCYTES ABSOLUTE: 0.6 K/UL (ref 0.2–0.8)
MONOCYTES ABSOLUTE: 0.6 K/UL (ref 0.2–0.8)
MONOCYTES ABSOLUTE: 0.7 K/UL (ref 0.2–0.8)
MONOCYTES ABSOLUTE: 0.8 K/UL (ref 0.2–0.8)
MONOCYTES ABSOLUTE: 0.9 K/UL (ref 0.2–0.8)
MONOCYTES RELATIVE PERCENT: 10.3 %
MONOCYTES RELATIVE PERCENT: 11.5 %
MONOCYTES RELATIVE PERCENT: 11.6 %
MONOCYTES RELATIVE PERCENT: 6.7 %
MONOCYTES RELATIVE PERCENT: 7.4 %
MONOCYTES RELATIVE PERCENT: 7.6 %
MONOCYTES RELATIVE PERCENT: 8.4 %
MONOCYTES RELATIVE PERCENT: 9.1 %
MONOCYTES RELATIVE PERCENT: 9.1 %
MONOCYTES RELATIVE PERCENT: 9.5 %
MONOCYTES RELATIVE PERCENT: 9.7 %
MYELOCYTE PERCENT: 2 %
MYELOCYTE PERCENT: 3 %
NEUTROPHILS ABSOLUTE: 3.2 K/UL (ref 1.4–6.5)
NEUTROPHILS ABSOLUTE: 3.4 K/UL (ref 1.4–6.5)
NEUTROPHILS ABSOLUTE: 3.5 K/UL (ref 1.4–6.5)
NEUTROPHILS ABSOLUTE: 3.7 K/UL (ref 1.4–6.5)
NEUTROPHILS ABSOLUTE: 4 K/UL (ref 1.4–6.5)
NEUTROPHILS ABSOLUTE: 4.9 K/UL (ref 1.4–6.5)
NEUTROPHILS ABSOLUTE: 4.9 K/UL (ref 1.4–6.5)
NEUTROPHILS ABSOLUTE: 5.2 K/UL (ref 1.4–6.5)
NEUTROPHILS ABSOLUTE: 5.8 K/UL (ref 1.4–6.5)
NEUTROPHILS ABSOLUTE: 6.9 K/UL (ref 1.4–6.5)
NEUTROPHILS ABSOLUTE: 9.5 K/UL (ref 1.4–6.5)
NEUTROPHILS RELATIVE PERCENT: 70.6 %
NEUTROPHILS RELATIVE PERCENT: 71.1 %
NEUTROPHILS RELATIVE PERCENT: 71.2 %
NEUTROPHILS RELATIVE PERCENT: 72 %
NEUTROPHILS RELATIVE PERCENT: 72.5 %
NEUTROPHILS RELATIVE PERCENT: 73.1 %
NEUTROPHILS RELATIVE PERCENT: 75 %
NEUTROPHILS RELATIVE PERCENT: 79.7 %
NEUTROPHILS RELATIVE PERCENT: 80 %
NEUTROPHILS RELATIVE PERCENT: 82.4 %
NEUTROPHILS RELATIVE PERCENT: 86.4 %
NITRITE, POC: NORMAL
NITRITE, URINE: NEGATIVE
NITRITE, URINE: NEGATIVE
PDW BLD-RTO: 15.5 % (ref 11.5–14.5)
PDW BLD-RTO: 16 % (ref 11.5–14.5)
PDW BLD-RTO: 16.1 % (ref 11.5–14.5)
PDW BLD-RTO: 16.4 % (ref 11.5–14.5)
PDW BLD-RTO: 16.5 % (ref 11.5–14.5)
PDW BLD-RTO: 16.5 % (ref 11.5–14.5)
PDW BLD-RTO: 16.7 % (ref 11.5–14.5)
PDW BLD-RTO: 16.9 % (ref 11.5–14.5)
PDW BLD-RTO: 17 % (ref 11.5–14.5)
PDW BLD-RTO: 17.2 % (ref 11.5–14.5)
PDW BLD-RTO: 17.4 % (ref 11.5–14.5)
PDW BLD-RTO: 17.5 % (ref 11.5–14.5)
PERFORMED ON: NORMAL
PERFORMED ON: NORMAL
PH UA: 6 (ref 5–9)
PH UA: 6 (ref 5–9)
PH, POC: 6
PHOSPHORUS: 2.2 MG/DL (ref 2.3–4.8)
PHOSPHORUS: 2.5 MG/DL (ref 2.3–4.8)
PHOSPHORUS: 2.9 MG/DL (ref 2.3–4.8)
PLATELET # BLD: 251 K/UL (ref 130–400)
PLATELET # BLD: 326 K/UL (ref 130–400)
PLATELET # BLD: 412 K/UL (ref 130–400)
PLATELET # BLD: 417 K/UL (ref 130–400)
PLATELET # BLD: 424 K/UL (ref 130–400)
PLATELET # BLD: 437 K/UL (ref 130–400)
PLATELET # BLD: 479 K/UL (ref 130–400)
PLATELET # BLD: 490 K/UL (ref 130–400)
PLATELET # BLD: 509 K/UL (ref 130–400)
PLATELET # BLD: 510 K/UL (ref 130–400)
PLATELET # BLD: 616 K/UL (ref 130–400)
PLATELET # BLD: 707 K/UL (ref 130–400)
PLATELET SLIDE REVIEW: ABNORMAL
PLATELET SLIDE REVIEW: ABNORMAL
POC CREATININE: 0.9 MG/DL (ref 0.6–1.2)
POC SAMPLE TYPE: NORMAL
POLYCHROMASIA: ABNORMAL
POTASSIUM REFLEX MAGNESIUM: 3.2 MEQ/L (ref 3.4–4.9)
POTASSIUM REFLEX MAGNESIUM: 3.3 MEQ/L (ref 3.4–4.9)
POTASSIUM REFLEX MAGNESIUM: 3.7 MEQ/L (ref 3.4–4.9)
POTASSIUM REFLEX MAGNESIUM: 4.2 MEQ/L (ref 3.4–4.9)
POTASSIUM REFLEX MAGNESIUM: 4.3 MEQ/L (ref 3.4–4.9)
POTASSIUM REFLEX MAGNESIUM: 4.3 MEQ/L (ref 3.4–4.9)
POTASSIUM SERPL-SCNC: 3.5 MEQ/L (ref 3.4–4.9)
POTASSIUM SERPL-SCNC: 3.6 MEQ/L (ref 3.4–4.9)
POTASSIUM SERPL-SCNC: 3.6 MEQ/L (ref 3.4–4.9)
POTASSIUM SERPL-SCNC: 3.8 MEQ/L (ref 3.4–4.9)
POTASSIUM SERPL-SCNC: 3.9 MEQ/L (ref 3.4–4.9)
POTASSIUM SERPL-SCNC: 4 MEQ/L (ref 3.4–4.9)
POTASSIUM SERPL-SCNC: 4.3 MEQ/L (ref 3.4–4.9)
PROCALCITONIN: 0.06 NG/ML (ref 0–0.15)
PROCALCITONIN: 0.32 NG/ML (ref 0–0.15)
PROTEIN UA: ABNORMAL MG/DL
PROTEIN UA: ABNORMAL MG/DL
PROTEIN, POC: NORMAL
PROTHROMBIN TIME: 13.8 SEC (ref 12.3–14.9)
RBC # BLD: 2.56 M/UL (ref 4.2–5.4)
RBC # BLD: 2.61 M/UL (ref 4.2–5.4)
RBC # BLD: 2.73 M/UL (ref 4.2–5.4)
RBC # BLD: 2.86 M/UL (ref 4.2–5.4)
RBC # BLD: 2.88 M/UL (ref 4.2–5.4)
RBC # BLD: 2.95 M/UL (ref 4.2–5.4)
RBC # BLD: 2.95 M/UL (ref 4.2–5.4)
RBC # BLD: 2.96 M/UL (ref 4.2–5.4)
RBC # BLD: 3.07 M/UL (ref 4.2–5.4)
RBC # BLD: 3.12 M/UL (ref 4.2–5.4)
RBC # BLD: 3.37 M/UL (ref 4.2–5.4)
RBC # BLD: 4.05 M/UL (ref 4.2–5.4)
RBC UA: NORMAL /HPF (ref 0–2)
SEDIMENTATION RATE, ERYTHROCYTE: 69 MM (ref 0–30)
SODIUM BLD-SCNC: 140 MEQ/L (ref 135–144)
SODIUM BLD-SCNC: 141 MEQ/L (ref 135–144)
SODIUM BLD-SCNC: 141 MEQ/L (ref 135–144)
SODIUM BLD-SCNC: 142 MEQ/L (ref 135–144)
SODIUM BLD-SCNC: 142 MEQ/L (ref 135–144)
SODIUM BLD-SCNC: 143 MEQ/L (ref 135–144)
SODIUM BLD-SCNC: 145 MEQ/L (ref 135–144)
SODIUM BLD-SCNC: 145 MEQ/L (ref 135–144)
SODIUM BLD-SCNC: 146 MEQ/L (ref 135–144)
SODIUM BLD-SCNC: 147 MEQ/L (ref 135–144)
SPECIFIC GRAVITY UA: 1.01 (ref 1–1.03)
SPECIFIC GRAVITY UA: 1.07 (ref 1–1.03)
SPECIFIC GRAVITY, POC: 1.03
TOTAL CK: 29 U/L (ref 0–170)
TOTAL IRON BINDING CAPACITY: 152 UG/DL (ref 178–450)
TOTAL PROTEIN: 6.6 G/DL (ref 6.3–8)
TOTAL PROTEIN: 6.6 G/DL (ref 6.3–8)
TRIGL SERPL-MCNC: 116 MG/DL (ref 0–150)
TRIGLYCERIDE, FASTING: 104 MG/DL (ref 0–150)
TROPONIN: 0.01 NG/ML (ref 0–0.01)
TROPONIN: 0.02 NG/ML (ref 0–0.01)
TROPONIN: <0.01 NG/ML (ref 0–0.01)
TROPONIN: <0.01 NG/ML (ref 0–0.01)
TSH SERPL DL<=0.05 MIU/L-ACNC: 2.5 UIU/ML (ref 0.44–3.86)
URINE CULTURE, ROUTINE: NORMAL
URINE REFLEX TO CULTURE: ABNORMAL
URINE REFLEX TO CULTURE: ABNORMAL
UROBILINOGEN, POC: NORMAL
UROBILINOGEN, URINE: 1 E.U./DL
UROBILINOGEN, URINE: 1 E.U./DL
VITAMIN B-12: 716 PG/ML (ref 232–1245)
WBC # BLD: 11 K/UL (ref 4.8–10.8)
WBC # BLD: 12.1 K/UL (ref 4.8–10.8)
WBC # BLD: 4.5 K/UL (ref 4.8–10.8)
WBC # BLD: 4.8 K/UL (ref 4.8–10.8)
WBC # BLD: 4.9 K/UL (ref 4.8–10.8)
WBC # BLD: 5.1 K/UL (ref 4.8–10.8)
WBC # BLD: 5.3 K/UL (ref 4.8–10.8)
WBC # BLD: 6.1 K/UL (ref 4.8–10.8)
WBC # BLD: 6.4 K/UL (ref 4.8–10.8)
WBC # BLD: 6.9 K/UL (ref 4.8–10.8)
WBC # BLD: 7.2 K/UL (ref 4.8–10.8)
WBC # BLD: 8.6 K/UL (ref 4.8–10.8)
WBC UA: NORMAL /HPF (ref 0–5)

## 2021-01-01 PROCEDURE — 6370000000 HC RX 637 (ALT 250 FOR IP): Performed by: NURSE PRACTITIONER

## 2021-01-01 PROCEDURE — 85025 COMPLETE CBC W/AUTO DIFF WBC: CPT

## 2021-01-01 PROCEDURE — 83735 ASSAY OF MAGNESIUM: CPT

## 2021-01-01 PROCEDURE — 96372 THER/PROPH/DIAG INJ SC/IM: CPT

## 2021-01-01 PROCEDURE — 2500000003 HC RX 250 WO HCPCS: Performed by: NURSE ANESTHETIST, CERTIFIED REGISTERED

## 2021-01-01 PROCEDURE — 94150 VITAL CAPACITY TEST: CPT

## 2021-01-01 PROCEDURE — 2060000000 HC ICU INTERMEDIATE R&B

## 2021-01-01 PROCEDURE — 96367 TX/PROPH/DG ADDL SEQ IV INF: CPT

## 2021-01-01 PROCEDURE — 1210000000 HC MED SURG R&B

## 2021-01-01 PROCEDURE — 36415 COLL VENOUS BLD VENIPUNCTURE: CPT

## 2021-01-01 PROCEDURE — 6360000002 HC RX W HCPCS: Performed by: NURSE PRACTITIONER

## 2021-01-01 PROCEDURE — 80048 BASIC METABOLIC PNL TOTAL CA: CPT

## 2021-01-01 PROCEDURE — 2580000003 HC RX 258: Performed by: INTERNAL MEDICINE

## 2021-01-01 PROCEDURE — 93000 ELECTROCARDIOGRAM COMPLETE: CPT | Performed by: PHYSICIAN ASSISTANT

## 2021-01-01 PROCEDURE — 83540 ASSAY OF IRON: CPT

## 2021-01-01 PROCEDURE — 2580000003 HC RX 258: Performed by: ORTHOPAEDIC SURGERY

## 2021-01-01 PROCEDURE — 6370000000 HC RX 637 (ALT 250 FOR IP): Performed by: INTERNAL MEDICINE

## 2021-01-01 PROCEDURE — 6360000002 HC RX W HCPCS: Performed by: INTERNAL MEDICINE

## 2021-01-01 PROCEDURE — 84484 ASSAY OF TROPONIN QUANT: CPT

## 2021-01-01 PROCEDURE — 2700000000 HC OXYGEN THERAPY PER DAY

## 2021-01-01 PROCEDURE — 94761 N-INVAS EAR/PLS OXIMETRY MLT: CPT

## 2021-01-01 PROCEDURE — 84145 PROCALCITONIN (PCT): CPT

## 2021-01-01 PROCEDURE — 87040 BLOOD CULTURE FOR BACTERIA: CPT

## 2021-01-01 PROCEDURE — 74177 CT ABD & PELVIS W/CONTRAST: CPT

## 2021-01-01 PROCEDURE — G0378 HOSPITAL OBSERVATION PER HR: HCPCS

## 2021-01-01 PROCEDURE — 2720000010 HC SURG SUPPLY STERILE: Performed by: ORTHOPAEDIC SURGERY

## 2021-01-01 PROCEDURE — 97165 OT EVAL LOW COMPLEX 30 MIN: CPT

## 2021-01-01 PROCEDURE — 93005 ELECTROCARDIOGRAM TRACING: CPT | Performed by: PHYSICIAN ASSISTANT

## 2021-01-01 PROCEDURE — 99213 OFFICE O/P EST LOW 20 MIN: CPT | Performed by: PHYSICIAN ASSISTANT

## 2021-01-01 PROCEDURE — 82550 ASSAY OF CK (CPK): CPT

## 2021-01-01 PROCEDURE — 94729 DIFFUSING CAPACITY: CPT

## 2021-01-01 PROCEDURE — 80069 RENAL FUNCTION PANEL: CPT

## 2021-01-01 PROCEDURE — 93010 ELECTROCARDIOGRAM REPORT: CPT | Performed by: INTERNAL MEDICINE

## 2021-01-01 PROCEDURE — 3209999900 FLUORO FOR SURGICAL PROCEDURES

## 2021-01-01 PROCEDURE — 94640 AIRWAY INHALATION TREATMENT: CPT

## 2021-01-01 PROCEDURE — 99285 EMERGENCY DEPT VISIT HI MDM: CPT

## 2021-01-01 PROCEDURE — 99214 OFFICE O/P EST MOD 30 MIN: CPT | Performed by: FAMILY MEDICINE

## 2021-01-01 PROCEDURE — 96365 THER/PROPH/DIAG IV INF INIT: CPT

## 2021-01-01 PROCEDURE — 7100000000 HC PACU RECOVERY - FIRST 15 MIN: Performed by: ORTHOPAEDIC SURGERY

## 2021-01-01 PROCEDURE — 93005 ELECTROCARDIOGRAM TRACING: CPT | Performed by: INTERNAL MEDICINE

## 2021-01-01 PROCEDURE — 76942 ECHO GUIDE FOR BIOPSY: CPT | Performed by: ANESTHESIOLOGY

## 2021-01-01 PROCEDURE — 6360000004 HC RX CONTRAST MEDICATION: Performed by: INTERNAL MEDICINE

## 2021-01-01 PROCEDURE — 80061 LIPID PANEL: CPT

## 2021-01-01 PROCEDURE — 2709999900 HC NON-CHARGEABLE SUPPLY: Performed by: ORTHOPAEDIC SURGERY

## 2021-01-01 PROCEDURE — 94060 EVALUATION OF WHEEZING: CPT

## 2021-01-01 PROCEDURE — 97116 GAIT TRAINING THERAPY: CPT

## 2021-01-01 PROCEDURE — 2500000003 HC RX 250 WO HCPCS: Performed by: INTERNAL MEDICINE

## 2021-01-01 PROCEDURE — 2500000003 HC RX 250 WO HCPCS: Performed by: ANESTHESIOLOGY

## 2021-01-01 PROCEDURE — 6360000002 HC RX W HCPCS: Performed by: ORTHOPAEDIC SURGERY

## 2021-01-01 PROCEDURE — 97166 OT EVAL MOD COMPLEX 45 MIN: CPT

## 2021-01-01 PROCEDURE — 94060 EVALUATION OF WHEEZING: CPT | Performed by: INTERNAL MEDICINE

## 2021-01-01 PROCEDURE — 86140 C-REACTIVE PROTEIN: CPT

## 2021-01-01 PROCEDURE — 86900 BLOOD TYPING SEROLOGIC ABO: CPT

## 2021-01-01 PROCEDURE — C1776 JOINT DEVICE (IMPLANTABLE): HCPCS | Performed by: ORTHOPAEDIC SURGERY

## 2021-01-01 PROCEDURE — 97162 PT EVAL MOD COMPLEX 30 MIN: CPT

## 2021-01-01 PROCEDURE — 99496 TRANSJ CARE MGMT HIGH F2F 7D: CPT | Performed by: FAMILY MEDICINE

## 2021-01-01 PROCEDURE — 82746 ASSAY OF FOLIC ACID SERUM: CPT

## 2021-01-01 PROCEDURE — 83550 IRON BINDING TEST: CPT

## 2021-01-01 PROCEDURE — 81003 URINALYSIS AUTO W/O SCOPE: CPT | Performed by: PHYSICIAN ASSISTANT

## 2021-01-01 PROCEDURE — 96366 THER/PROPH/DIAG IV INF ADDON: CPT

## 2021-01-01 PROCEDURE — 83690 ASSAY OF LIPASE: CPT

## 2021-01-01 PROCEDURE — 93306 TTE W/DOPPLER COMPLETE: CPT

## 2021-01-01 PROCEDURE — 80053 COMPREHEN METABOLIC PANEL: CPT

## 2021-01-01 PROCEDURE — 3600000014 HC SURGERY LEVEL 4 ADDTL 15MIN: Performed by: ORTHOPAEDIC SURGERY

## 2021-01-01 PROCEDURE — 2580000003 HC RX 258: Performed by: PHYSICIAN ASSISTANT

## 2021-01-01 PROCEDURE — 99214 OFFICE O/P EST MOD 30 MIN: CPT | Performed by: INTERNAL MEDICINE

## 2021-01-01 PROCEDURE — 86901 BLOOD TYPING SEROLOGIC RH(D): CPT

## 2021-01-01 PROCEDURE — 72170 X-RAY EXAM OF PELVIS: CPT

## 2021-01-01 PROCEDURE — 94664 DEMO&/EVAL PT USE INHALER: CPT

## 2021-01-01 PROCEDURE — 83605 ASSAY OF LACTIC ACID: CPT

## 2021-01-01 PROCEDURE — 6360000004 HC RX CONTRAST MEDICATION: Performed by: STUDENT IN AN ORGANIZED HEALTH CARE EDUCATION/TRAINING PROGRAM

## 2021-01-01 PROCEDURE — 96360 HYDRATION IV INFUSION INIT: CPT

## 2021-01-01 PROCEDURE — 85610 PROTHROMBIN TIME: CPT

## 2021-01-01 PROCEDURE — 2500000003 HC RX 250 WO HCPCS: Performed by: ORTHOPAEDIC SURGERY

## 2021-01-01 PROCEDURE — 2580000003 HC RX 258: Performed by: STUDENT IN AN ORGANIZED HEALTH CARE EDUCATION/TRAINING PROGRAM

## 2021-01-01 PROCEDURE — 6370000000 HC RX 637 (ALT 250 FOR IP): Performed by: PHYSICIAN ASSISTANT

## 2021-01-01 PROCEDURE — 97110 THERAPEUTIC EXERCISES: CPT

## 2021-01-01 PROCEDURE — 85730 THROMBOPLASTIN TIME PARTIAL: CPT

## 2021-01-01 PROCEDURE — G0439 PPPS, SUBSEQ VISIT: HCPCS | Performed by: FAMILY MEDICINE

## 2021-01-01 PROCEDURE — 99215 OFFICE O/P EST HI 40 MIN: CPT | Performed by: FAMILY MEDICINE

## 2021-01-01 PROCEDURE — 82728 ASSAY OF FERRITIN: CPT

## 2021-01-01 PROCEDURE — 81001 URINALYSIS AUTO W/SCOPE: CPT

## 2021-01-01 PROCEDURE — 99213 OFFICE O/P EST LOW 20 MIN: CPT | Performed by: INTERNAL MEDICINE

## 2021-01-01 PROCEDURE — 84443 ASSAY THYROID STIM HORMONE: CPT

## 2021-01-01 PROCEDURE — 3700000001 HC ADD 15 MINUTES (ANESTHESIA): Performed by: ORTHOPAEDIC SURGERY

## 2021-01-01 PROCEDURE — 94726 PLETHYSMOGRAPHY LUNG VOLUMES: CPT

## 2021-01-01 PROCEDURE — 96361 HYDRATE IV INFUSION ADD-ON: CPT

## 2021-01-01 PROCEDURE — 94729 DIFFUSING CAPACITY: CPT | Performed by: INTERNAL MEDICINE

## 2021-01-01 PROCEDURE — 94726 PLETHYSMOGRAPHY LUNG VOLUMES: CPT | Performed by: INTERNAL MEDICINE

## 2021-01-01 PROCEDURE — 85652 RBC SED RATE AUTOMATED: CPT

## 2021-01-01 PROCEDURE — 3600000004 HC SURGERY LEVEL 4 BASE: Performed by: ORTHOPAEDIC SURGERY

## 2021-01-01 PROCEDURE — 6360000002 HC RX W HCPCS: Performed by: NURSE ANESTHETIST, CERTIFIED REGISTERED

## 2021-01-01 PROCEDURE — 1111F DSCHRG MED/CURRENT MED MERGE: CPT | Performed by: FAMILY MEDICINE

## 2021-01-01 PROCEDURE — 81003 URINALYSIS AUTO W/O SCOPE: CPT

## 2021-01-01 PROCEDURE — 7100000001 HC PACU RECOVERY - ADDTL 15 MIN: Performed by: ORTHOPAEDIC SURGERY

## 2021-01-01 PROCEDURE — 71046 X-RAY EXAM CHEST 2 VIEWS: CPT

## 2021-01-01 PROCEDURE — 2580000003 HC RX 258: Performed by: NURSE PRACTITIONER

## 2021-01-01 PROCEDURE — 86850 RBC ANTIBODY SCREEN: CPT

## 2021-01-01 PROCEDURE — 85027 COMPLETE CBC AUTOMATED: CPT

## 2021-01-01 PROCEDURE — 94760 N-INVAS EAR/PLS OXIMETRY 1: CPT

## 2021-01-01 PROCEDURE — 99213 OFFICE O/P EST LOW 20 MIN: CPT | Performed by: PSYCHIATRY & NEUROLOGY

## 2021-01-01 PROCEDURE — 82607 VITAMIN B-12: CPT

## 2021-01-01 PROCEDURE — 99204 OFFICE O/P NEW MOD 45 MIN: CPT | Performed by: NURSE PRACTITIONER

## 2021-01-01 PROCEDURE — 64450 NJX AA&/STRD OTHER PN/BRANCH: CPT

## 2021-01-01 PROCEDURE — 3700000000 HC ANESTHESIA ATTENDED CARE: Performed by: ORTHOPAEDIC SURGERY

## 2021-01-01 PROCEDURE — 2500000003 HC RX 250 WO HCPCS: Performed by: STUDENT IN AN ORGANIZED HEALTH CARE EDUCATION/TRAINING PROGRAM

## 2021-01-01 DEVICE — LINER ACET SZ E DIA36MM HIP ARCOMXL NEUT G7: Type: IMPLANTABLE DEVICE | Status: FUNCTIONAL

## 2021-01-01 DEVICE — IMPLANTABLE DEVICE: Type: IMPLANTABLE DEVICE | Status: FUNCTIONAL

## 2021-01-01 DEVICE — SHELL ACET SZ E DIA52MM 3 H OSSEOTI LIMIT H 2 MOBILITY G7: Type: IMPLANTABLE DEVICE | Status: FUNCTIONAL

## 2021-01-01 DEVICE — HEAD FEM DIA36MM +0MM STD OFFSET CO CHROM HIP TYP 1 TAPR: Type: IMPLANTABLE DEVICE | Status: FUNCTIONAL

## 2021-01-01 RX ORDER — MELOXICAM 7.5 MG/1
3.75 TABLET ORAL DAILY
Status: DISCONTINUED | OUTPATIENT
Start: 2021-01-01 | End: 2021-01-01

## 2021-01-01 RX ORDER — ALENDRONATE SODIUM 70 MG/1
70 TABLET ORAL
Status: DISCONTINUED | OUTPATIENT
Start: 2021-01-01 | End: 2021-01-01 | Stop reason: RX

## 2021-01-01 RX ORDER — ACETAMINOPHEN 650 MG/1
650 SUPPOSITORY RECTAL EVERY 6 HOURS PRN
Status: DISCONTINUED | OUTPATIENT
Start: 2021-01-01 | End: 2021-01-01 | Stop reason: HOSPADM

## 2021-01-01 RX ORDER — SODIUM CHLORIDE, SODIUM LACTATE, POTASSIUM CHLORIDE, CALCIUM CHLORIDE 600; 310; 30; 20 MG/100ML; MG/100ML; MG/100ML; MG/100ML
INJECTION, SOLUTION INTRAVENOUS CONTINUOUS
Status: DISPENSED | OUTPATIENT
Start: 2021-01-01 | End: 2021-01-01

## 2021-01-01 RX ORDER — ATORVASTATIN CALCIUM 20 MG/1
20 TABLET, FILM COATED ORAL DAILY
Status: DISCONTINUED | OUTPATIENT
Start: 2021-01-01 | End: 2021-01-01 | Stop reason: HOSPADM

## 2021-01-01 RX ORDER — ASPIRIN 81 MG/1
81 TABLET ORAL DAILY
Status: DISCONTINUED | OUTPATIENT
Start: 2021-01-01 | End: 2021-01-01

## 2021-01-01 RX ORDER — SODIUM CHLORIDE 0.9 % (FLUSH) 0.9 %
10 SYRINGE (ML) INJECTION PRN
Status: DISCONTINUED | OUTPATIENT
Start: 2021-01-01 | End: 2021-01-01 | Stop reason: HOSPADM

## 2021-01-01 RX ORDER — DEXAMETHASONE SODIUM PHOSPHATE 10 MG/ML
INJECTION INTRAMUSCULAR; INTRAVENOUS PRN
Status: DISCONTINUED | OUTPATIENT
Start: 2021-01-01 | End: 2021-01-01 | Stop reason: SDUPTHER

## 2021-01-01 RX ORDER — POTASSIUM CHLORIDE 20 MEQ/1
40 TABLET, EXTENDED RELEASE ORAL ONCE
Status: COMPLETED | OUTPATIENT
Start: 2021-01-01 | End: 2021-01-01

## 2021-01-01 RX ORDER — TRANEXAMIC ACID 650 1/1
1950 TABLET ORAL ONCE
Status: COMPLETED | OUTPATIENT
Start: 2021-01-01 | End: 2021-01-01

## 2021-01-01 RX ORDER — UMECLIDINIUM BROMIDE AND VILANTEROL TRIFENATATE 62.5; 25 UG/1; UG/1
1 POWDER RESPIRATORY (INHALATION) DAILY
Qty: 90 PUFF | Refills: 1 | Status: SHIPPED | OUTPATIENT
Start: 2021-01-01

## 2021-01-01 RX ORDER — FLECAINIDE ACETATE 50 MG/1
50 TABLET ORAL EVERY 12 HOURS SCHEDULED
Qty: 60 TABLET | Refills: 3 | Status: SHIPPED | OUTPATIENT
Start: 2021-01-01

## 2021-01-01 RX ORDER — ATORVASTATIN CALCIUM 20 MG/1
20 TABLET, FILM COATED ORAL NIGHTLY
Status: DISCONTINUED | OUTPATIENT
Start: 2021-01-01 | End: 2021-01-01 | Stop reason: HOSPADM

## 2021-01-01 RX ORDER — ONDANSETRON 4 MG/1
4 TABLET, ORALLY DISINTEGRATING ORAL EVERY 8 HOURS PRN
Status: DISCONTINUED | OUTPATIENT
Start: 2021-01-01 | End: 2021-01-01 | Stop reason: HOSPADM

## 2021-01-01 RX ORDER — ASCORBIC ACID 500 MG
500 TABLET ORAL DAILY
Status: DISCONTINUED | OUTPATIENT
Start: 2021-01-01 | End: 2021-01-01 | Stop reason: HOSPADM

## 2021-01-01 RX ORDER — EPHEDRINE SULFATE/0.9% NACL/PF 50 MG/5 ML
SYRINGE (ML) INTRAVENOUS PRN
Status: DISCONTINUED | OUTPATIENT
Start: 2021-01-01 | End: 2021-01-01 | Stop reason: SDUPTHER

## 2021-01-01 RX ORDER — CELECOXIB 200 MG/1
200 CAPSULE ORAL ONCE
Status: COMPLETED | OUTPATIENT
Start: 2021-01-01 | End: 2021-01-01

## 2021-01-01 RX ORDER — METRONIDAZOLE 500 MG/1
500 TABLET ORAL 3 TIMES DAILY
Qty: 30 TABLET | Refills: 0 | Status: ON HOLD | OUTPATIENT
Start: 2021-01-01 | End: 2021-01-01

## 2021-01-01 RX ORDER — ALBUTEROL SULFATE 90 UG/1
2 AEROSOL, METERED RESPIRATORY (INHALATION)
Qty: 1 INHALER | Refills: 3 | Status: SHIPPED | OUTPATIENT
Start: 2021-01-01

## 2021-01-01 RX ORDER — VITS A,C,E/LUTEIN/MINERALS 300MCG-200
1 TABLET ORAL DAILY
Status: DISCONTINUED | OUTPATIENT
Start: 2021-01-01 | End: 2021-01-01 | Stop reason: HOSPADM

## 2021-01-01 RX ORDER — AMLODIPINE BESYLATE 5 MG/1
TABLET ORAL
Qty: 90 TABLET | Refills: 4 | Status: ON HOLD | OUTPATIENT
Start: 2021-01-01 | End: 2021-01-01 | Stop reason: HOSPADM

## 2021-01-01 RX ORDER — TRAMADOL HYDROCHLORIDE 50 MG/1
50 TABLET ORAL EVERY 6 HOURS PRN
Status: DISCONTINUED | OUTPATIENT
Start: 2021-01-01 | End: 2021-01-01

## 2021-01-01 RX ORDER — ONDANSETRON 2 MG/ML
4 INJECTION INTRAMUSCULAR; INTRAVENOUS EVERY 6 HOURS PRN
Status: DISCONTINUED | OUTPATIENT
Start: 2021-01-01 | End: 2021-01-01 | Stop reason: HOSPADM

## 2021-01-01 RX ORDER — LACTULOSE 10 G/15ML
20 SOLUTION ORAL ONCE
Status: COMPLETED | OUTPATIENT
Start: 2021-01-01 | End: 2021-01-01

## 2021-01-01 RX ORDER — ASPIRIN 81 MG/1
81 TABLET ORAL DAILY
Status: DISCONTINUED | OUTPATIENT
Start: 2021-01-01 | End: 2021-01-01 | Stop reason: SDUPTHER

## 2021-01-01 RX ORDER — SODIUM CHLORIDE 9 MG/ML
25 INJECTION, SOLUTION INTRAVENOUS PRN
Status: DISCONTINUED | OUTPATIENT
Start: 2021-01-01 | End: 2021-01-01 | Stop reason: HOSPADM

## 2021-01-01 RX ORDER — ASPIRIN 81 MG/1
324 TABLET, CHEWABLE ORAL ONCE
Status: COMPLETED | OUTPATIENT
Start: 2021-01-01 | End: 2021-01-01

## 2021-01-01 RX ORDER — HYDROCODONE BITARTRATE AND ACETAMINOPHEN 5; 325 MG/1; MG/1
2 TABLET ORAL PRN
Status: DISCONTINUED | OUTPATIENT
Start: 2021-01-01 | End: 2021-01-01 | Stop reason: HOSPADM

## 2021-01-01 RX ORDER — DILTIAZEM HYDROCHLORIDE 5 MG/ML
20 INJECTION INTRAVENOUS ONCE
Status: COMPLETED | OUTPATIENT
Start: 2021-01-01 | End: 2021-01-01

## 2021-01-01 RX ORDER — MAGNESIUM HYDROXIDE 1200 MG/15ML
LIQUID ORAL CONTINUOUS PRN
Status: COMPLETED | OUTPATIENT
Start: 2021-01-01 | End: 2021-01-01

## 2021-01-01 RX ORDER — TRAMADOL HYDROCHLORIDE 50 MG/1
50 TABLET ORAL EVERY 6 HOURS PRN
Status: DISCONTINUED | OUTPATIENT
Start: 2021-01-01 | End: 2021-01-01 | Stop reason: HOSPADM

## 2021-01-01 RX ORDER — FLUTICASONE PROPIONATE 50 MCG
2 SPRAY, SUSPENSION (ML) NASAL DAILY
Qty: 1 BOTTLE | Refills: 5 | Status: SHIPPED | OUTPATIENT
Start: 2021-01-01 | End: 2021-01-01

## 2021-01-01 RX ORDER — METOPROLOL SUCCINATE 25 MG/1
12.5 TABLET, EXTENDED RELEASE ORAL DAILY
Qty: 30 TABLET | Refills: 3 | Status: SHIPPED | OUTPATIENT
Start: 2021-01-01 | End: 2021-01-01 | Stop reason: SINTOL

## 2021-01-01 RX ORDER — ACETAMINOPHEN 500 MG
1000 TABLET ORAL ONCE
Status: COMPLETED | OUTPATIENT
Start: 2021-01-01 | End: 2021-01-01

## 2021-01-01 RX ORDER — ALBUTEROL SULFATE 90 UG/1
2 AEROSOL, METERED RESPIRATORY (INHALATION)
Status: DISCONTINUED | OUTPATIENT
Start: 2021-01-01 | End: 2021-01-01 | Stop reason: HOSPADM

## 2021-01-01 RX ORDER — DONEPEZIL HYDROCHLORIDE 5 MG/1
5 TABLET, FILM COATED ORAL NIGHTLY
Qty: 90 TABLET | Refills: 4 | Status: SHIPPED | OUTPATIENT
Start: 2021-01-01 | End: 2021-01-01

## 2021-01-01 RX ORDER — FLUTICASONE PROPIONATE 50 MCG
SPRAY, SUSPENSION (ML) NASAL
Qty: 16 G | Refills: 5 | Status: SHIPPED | OUTPATIENT
Start: 2021-01-01

## 2021-01-01 RX ORDER — SULFAMETHOXAZOLE AND TRIMETHOPRIM 800; 160 MG/1; MG/1
1 TABLET ORAL EVERY 12 HOURS SCHEDULED
Status: DISCONTINUED | OUTPATIENT
Start: 2021-01-01 | End: 2021-01-01 | Stop reason: HOSPADM

## 2021-01-01 RX ORDER — KETOROLAC TROMETHAMINE 15 MG/ML
15 INJECTION, SOLUTION INTRAMUSCULAR; INTRAVENOUS EVERY 8 HOURS
Status: DISCONTINUED | OUTPATIENT
Start: 2021-01-01 | End: 2021-01-01

## 2021-01-01 RX ORDER — ESCITALOPRAM OXALATE 10 MG/1
10 TABLET ORAL DAILY
Status: DISCONTINUED | OUTPATIENT
Start: 2021-01-01 | End: 2021-01-01 | Stop reason: HOSPADM

## 2021-01-01 RX ORDER — CEFAZOLIN SODIUM 2 G/50ML
2000 SOLUTION INTRAVENOUS EVERY 8 HOURS
Status: COMPLETED | OUTPATIENT
Start: 2021-01-01 | End: 2021-01-01

## 2021-01-01 RX ORDER — UMECLIDINIUM BROMIDE AND VILANTEROL TRIFENATATE 62.5; 25 UG/1; UG/1
1 POWDER RESPIRATORY (INHALATION) DAILY
Qty: 30 PUFF | Refills: 3 | Status: SHIPPED | OUTPATIENT
Start: 2021-01-01 | End: 2021-01-01 | Stop reason: SDUPTHER

## 2021-01-01 RX ORDER — TRAMADOL HYDROCHLORIDE 50 MG/1
25 TABLET ORAL EVERY 8 HOURS PRN
Qty: 8 TABLET | Refills: 0 | Status: SHIPPED | OUTPATIENT
Start: 2021-01-01 | End: 2021-01-01

## 2021-01-01 RX ORDER — METOCLOPRAMIDE HYDROCHLORIDE 5 MG/ML
10 INJECTION INTRAMUSCULAR; INTRAVENOUS
Status: DISCONTINUED | OUTPATIENT
Start: 2021-01-01 | End: 2021-01-01 | Stop reason: HOSPADM

## 2021-01-01 RX ORDER — SODIUM CHLORIDE 0.9 % (FLUSH) 0.9 %
5-40 SYRINGE (ML) INJECTION PRN
Status: DISCONTINUED | OUTPATIENT
Start: 2021-01-01 | End: 2021-01-01 | Stop reason: HOSPADM

## 2021-01-01 RX ORDER — ASPIRIN 81 MG/1
81 TABLET ORAL 2 TIMES DAILY
Qty: 60 TABLET | Refills: 0 | Status: ON HOLD | OUTPATIENT
Start: 2021-01-01 | End: 2021-01-01 | Stop reason: SDUPTHER

## 2021-01-01 RX ORDER — SENNA AND DOCUSATE SODIUM 50; 8.6 MG/1; MG/1
1 TABLET, FILM COATED ORAL 2 TIMES DAILY
Status: DISCONTINUED | OUTPATIENT
Start: 2021-01-01 | End: 2021-01-01 | Stop reason: HOSPADM

## 2021-01-01 RX ORDER — PREDNISONE 20 MG/1
40 TABLET ORAL DAILY
Qty: 10 TABLET | Refills: 0 | Status: SHIPPED | OUTPATIENT
Start: 2021-01-01 | End: 2021-08-17

## 2021-01-01 RX ORDER — CINNAMON
1 OIL (ML) MISCELLANEOUS 4 TIMES DAILY
Status: DISCONTINUED | OUTPATIENT
Start: 2021-01-01 | End: 2021-01-01 | Stop reason: HOSPADM

## 2021-01-01 RX ORDER — PROPOFOL 10 MG/ML
INJECTION, EMULSION INTRAVENOUS CONTINUOUS PRN
Status: DISCONTINUED | OUTPATIENT
Start: 2021-01-01 | End: 2021-01-01 | Stop reason: SDUPTHER

## 2021-01-01 RX ORDER — POLYETHYLENE GLYCOL 3350 17 G/17G
17 POWDER, FOR SOLUTION ORAL DAILY PRN
Status: DISCONTINUED | OUTPATIENT
Start: 2021-01-01 | End: 2021-01-01 | Stop reason: HOSPADM

## 2021-01-01 RX ORDER — METRONIDAZOLE 500 MG/1
500 TABLET ORAL 3 TIMES DAILY
Qty: 21 TABLET | Refills: 0 | Status: SHIPPED | OUTPATIENT
Start: 2021-01-01 | End: 2021-01-01

## 2021-01-01 RX ORDER — CIPROFLOXACIN 2 MG/ML
400 INJECTION, SOLUTION INTRAVENOUS EVERY 12 HOURS
Status: DISCONTINUED | OUTPATIENT
Start: 2021-01-01 | End: 2021-01-01

## 2021-01-01 RX ORDER — ACETAMINOPHEN 500 MG
1000 TABLET ORAL EVERY 8 HOURS
Status: DISCONTINUED | OUTPATIENT
Start: 2021-01-01 | End: 2021-01-01 | Stop reason: HOSPADM

## 2021-01-01 RX ORDER — SODIUM CHLORIDE 0.9 % (FLUSH) 0.9 %
10 SYRINGE (ML) INJECTION EVERY 12 HOURS SCHEDULED
Status: DISCONTINUED | OUTPATIENT
Start: 2021-01-01 | End: 2021-01-01 | Stop reason: HOSPADM

## 2021-01-01 RX ORDER — METOPROLOL SUCCINATE 25 MG/1
12.5 TABLET, EXTENDED RELEASE ORAL DAILY
Status: DISCONTINUED | OUTPATIENT
Start: 2021-01-01 | End: 2021-01-01 | Stop reason: HOSPADM

## 2021-01-01 RX ORDER — DEXTROSE, SODIUM CHLORIDE, SODIUM LACTATE, POTASSIUM CHLORIDE, AND CALCIUM CHLORIDE 5; .6; .31; .03; .02 G/100ML; G/100ML; G/100ML; G/100ML; G/100ML
INJECTION, SOLUTION INTRAVENOUS CONTINUOUS
Status: DISPENSED | OUTPATIENT
Start: 2021-01-01 | End: 2021-01-01

## 2021-01-01 RX ORDER — VITAMIN B COMPLEX
1000 TABLET ORAL DAILY
Status: DISCONTINUED | OUTPATIENT
Start: 2021-01-01 | End: 2021-01-01 | Stop reason: HOSPADM

## 2021-01-01 RX ORDER — ASPIRIN 81 MG/1
81 TABLET ORAL 2 TIMES DAILY
Status: DISCONTINUED | OUTPATIENT
Start: 2021-01-01 | End: 2021-01-01 | Stop reason: HOSPADM

## 2021-01-01 RX ORDER — ALENDRONATE SODIUM 70 MG/1
70 TABLET ORAL
Qty: 12 TABLET | Refills: 4 | Status: SHIPPED | OUTPATIENT
Start: 2021-01-01

## 2021-01-01 RX ORDER — DONEPEZIL HYDROCHLORIDE 5 MG/1
5 TABLET, FILM COATED ORAL NIGHTLY
Status: DISCONTINUED | OUTPATIENT
Start: 2021-01-01 | End: 2021-01-01 | Stop reason: HOSPADM

## 2021-01-01 RX ORDER — SODIUM CHLORIDE, SODIUM LACTATE, POTASSIUM CHLORIDE, CALCIUM CHLORIDE 600; 310; 30; 20 MG/100ML; MG/100ML; MG/100ML; MG/100ML
INJECTION, SOLUTION INTRAVENOUS CONTINUOUS
Status: DISCONTINUED | OUTPATIENT
Start: 2021-01-01 | End: 2021-01-01 | Stop reason: HOSPADM

## 2021-01-01 RX ORDER — SENNA AND DOCUSATE SODIUM 50; 8.6 MG/1; MG/1
1 TABLET, FILM COATED ORAL 2 TIMES DAILY
Qty: 20 TABLET | Refills: 0 | Status: SHIPPED | OUTPATIENT
Start: 2021-01-01 | End: 2021-01-01

## 2021-01-01 RX ORDER — FLUTICASONE PROPIONATE 50 MCG
2 SPRAY, SUSPENSION (ML) NASAL DAILY
Status: DISCONTINUED | OUTPATIENT
Start: 2021-01-01 | End: 2021-01-01 | Stop reason: HOSPADM

## 2021-01-01 RX ORDER — AMOXICILLIN AND CLAVULANATE POTASSIUM 875; 125 MG/1; MG/1
1 TABLET, FILM COATED ORAL 2 TIMES DAILY
Status: DISCONTINUED | OUTPATIENT
Start: 2021-01-01 | End: 2021-01-01

## 2021-01-01 RX ORDER — DIPHENHYDRAMINE HYDROCHLORIDE 50 MG/ML
12.5 INJECTION INTRAMUSCULAR; INTRAVENOUS
Status: DISCONTINUED | OUTPATIENT
Start: 2021-01-01 | End: 2021-01-01 | Stop reason: HOSPADM

## 2021-01-01 RX ORDER — OXYCODONE HCL 10 MG/1
10 TABLET, FILM COATED, EXTENDED RELEASE ORAL ONCE
Status: COMPLETED | OUTPATIENT
Start: 2021-01-01 | End: 2021-01-01

## 2021-01-01 RX ORDER — ONDANSETRON 2 MG/ML
4 INJECTION INTRAMUSCULAR; INTRAVENOUS
Status: DISCONTINUED | OUTPATIENT
Start: 2021-01-01 | End: 2021-01-01 | Stop reason: HOSPADM

## 2021-01-01 RX ORDER — FLECAINIDE ACETATE 100 MG/1
50 TABLET ORAL EVERY 12 HOURS SCHEDULED
Status: DISCONTINUED | OUTPATIENT
Start: 2021-01-01 | End: 2021-01-01 | Stop reason: HOSPADM

## 2021-01-01 RX ORDER — SULFAMETHOXAZOLE AND TRIMETHOPRIM 800; 160 MG/1; MG/1
1 TABLET ORAL EVERY 12 HOURS SCHEDULED
Qty: 14 TABLET | Refills: 0 | Status: SHIPPED | OUTPATIENT
Start: 2021-01-01 | End: 2021-01-01

## 2021-01-01 RX ORDER — DONEPEZIL HYDROCHLORIDE 10 MG/1
5 TABLET, FILM COATED ORAL NIGHTLY
Status: DISCONTINUED | OUTPATIENT
Start: 2021-01-01 | End: 2021-01-01 | Stop reason: HOSPADM

## 2021-01-01 RX ORDER — LIDOCAINE HYDROCHLORIDE 10 MG/ML
2 INJECTION, SOLUTION EPIDURAL; INFILTRATION; INTRACAUDAL; PERINEURAL ONCE
Status: COMPLETED | OUTPATIENT
Start: 2021-01-01 | End: 2021-01-01

## 2021-01-01 RX ORDER — ACETAMINOPHEN 325 MG/1
650 TABLET ORAL EVERY 6 HOURS PRN
Status: DISCONTINUED | OUTPATIENT
Start: 2021-01-01 | End: 2021-01-01 | Stop reason: HOSPADM

## 2021-01-01 RX ORDER — SODIUM CHLORIDE, SODIUM LACTATE, POTASSIUM CHLORIDE, AND CALCIUM CHLORIDE .6; .31; .03; .02 G/100ML; G/100ML; G/100ML; G/100ML
500 INJECTION, SOLUTION INTRAVENOUS ONCE
Status: COMPLETED | OUTPATIENT
Start: 2021-01-01 | End: 2021-01-01

## 2021-01-01 RX ORDER — ATORVASTATIN CALCIUM 20 MG/1
20 TABLET, FILM COATED ORAL DAILY
Qty: 90 TABLET | Refills: 4 | Status: SHIPPED | OUTPATIENT
Start: 2021-01-01

## 2021-01-01 RX ORDER — DOCUSATE SODIUM 100 MG/1
100 CAPSULE, LIQUID FILLED ORAL 2 TIMES DAILY
Status: DISCONTINUED | OUTPATIENT
Start: 2021-01-01 | End: 2021-01-01 | Stop reason: HOSPADM

## 2021-01-01 RX ORDER — CHOLECALCIFEROL (VITAMIN D3) 125 MCG
500 CAPSULE ORAL DAILY
Status: DISCONTINUED | OUTPATIENT
Start: 2021-01-01 | End: 2021-01-01 | Stop reason: HOSPADM

## 2021-01-01 RX ORDER — MAGNESIUM HYDROXIDE 1200 MG/15ML
LIQUID ORAL PRN
Status: DISCONTINUED | OUTPATIENT
Start: 2021-01-01 | End: 2021-01-01 | Stop reason: ALTCHOICE

## 2021-01-01 RX ORDER — CHOLECALCIFEROL (VITAMIN D3) 125 MCG
500 CAPSULE ORAL DAILY
COMMUNITY

## 2021-01-01 RX ORDER — ASPIRIN 81 MG/1
81 TABLET ORAL DAILY
Qty: 30 TABLET | Refills: 0 | Status: ON HOLD | COMMUNITY
Start: 2021-01-01 | End: 2021-01-01 | Stop reason: HOSPADM

## 2021-01-01 RX ORDER — CALCIUM CARBONATE 200(500)MG
500 TABLET,CHEWABLE ORAL 2 TIMES DAILY
Status: DISCONTINUED | OUTPATIENT
Start: 2021-01-01 | End: 2021-01-01 | Stop reason: HOSPADM

## 2021-01-01 RX ORDER — SODIUM CHLORIDE 0.9 % (FLUSH) 0.9 %
5-40 SYRINGE (ML) INJECTION EVERY 12 HOURS SCHEDULED
Status: DISCONTINUED | OUTPATIENT
Start: 2021-01-01 | End: 2021-01-01 | Stop reason: HOSPADM

## 2021-01-01 RX ORDER — MEPERIDINE HYDROCHLORIDE 25 MG/ML
12.5 INJECTION INTRAMUSCULAR; INTRAVENOUS; SUBCUTANEOUS EVERY 5 MIN PRN
Status: DISCONTINUED | OUTPATIENT
Start: 2021-01-01 | End: 2021-01-01 | Stop reason: HOSPADM

## 2021-01-01 RX ORDER — LIDOCAINE HYDROCHLORIDE 20 MG/ML
INJECTION, SOLUTION INTRAVENOUS PRN
Status: DISCONTINUED | OUTPATIENT
Start: 2021-01-01 | End: 2021-01-01 | Stop reason: SDUPTHER

## 2021-01-01 RX ORDER — 0.9 % SODIUM CHLORIDE 0.9 %
1000 INTRAVENOUS SOLUTION INTRAVENOUS ONCE
Status: COMPLETED | OUTPATIENT
Start: 2021-01-01 | End: 2021-01-01

## 2021-01-01 RX ORDER — SODIUM CHLORIDE 0.9 % (FLUSH) 0.9 %
10 SYRINGE (ML) INJECTION ONCE
Status: COMPLETED | OUTPATIENT
Start: 2021-01-01 | End: 2021-01-01

## 2021-01-01 RX ORDER — DONEPEZIL HYDROCHLORIDE 5 MG/1
5 TABLET, FILM COATED ORAL NIGHTLY
Qty: 90 TABLET | Refills: 4 | Status: SHIPPED | OUTPATIENT
Start: 2021-01-01 | End: 2021-01-01 | Stop reason: SDUPTHER

## 2021-01-01 RX ORDER — BUPIVACAINE HYDROCHLORIDE 2.5 MG/ML
INJECTION, SOLUTION EPIDURAL; INFILTRATION; INTRACAUDAL PRN
Status: DISCONTINUED | OUTPATIENT
Start: 2021-01-01 | End: 2021-01-01 | Stop reason: SDUPTHER

## 2021-01-01 RX ORDER — HYDROCODONE BITARTRATE AND ACETAMINOPHEN 5; 325 MG/1; MG/1
1 TABLET ORAL PRN
Status: DISCONTINUED | OUTPATIENT
Start: 2021-01-01 | End: 2021-01-01 | Stop reason: HOSPADM

## 2021-01-01 RX ORDER — CEFAZOLIN SODIUM 2 G/50ML
2000 SOLUTION INTRAVENOUS
Status: COMPLETED | OUTPATIENT
Start: 2021-01-01 | End: 2021-01-01

## 2021-01-01 RX ORDER — NITROGLYCERIN 0.4 MG/1
0.4 TABLET SUBLINGUAL EVERY 5 MIN PRN
Status: DISCONTINUED | OUTPATIENT
Start: 2021-01-01 | End: 2021-01-01 | Stop reason: HOSPADM

## 2021-01-01 RX ORDER — BUPIVACAINE HYDROCHLORIDE 7.5 MG/ML
INJECTION, SOLUTION INTRASPINAL PRN
Status: DISCONTINUED | OUTPATIENT
Start: 2021-01-01 | End: 2021-01-01 | Stop reason: SDUPTHER

## 2021-01-01 RX ORDER — L. ACIDOPHILUS/L.BULGARICUS 1MM CELL
4 TABLET ORAL 3 TIMES DAILY
Status: DISCONTINUED | OUTPATIENT
Start: 2021-01-01 | End: 2021-01-01 | Stop reason: HOSPADM

## 2021-01-01 RX ORDER — TRAMADOL HYDROCHLORIDE 50 MG/1
25 TABLET ORAL EVERY 8 HOURS PRN
Qty: 8 TABLET | Refills: 0 | Status: SHIPPED | OUTPATIENT
Start: 2021-01-01 | End: 2021-01-01 | Stop reason: SDUPTHER

## 2021-01-01 RX ORDER — ESCITALOPRAM OXALATE 20 MG/1
10 TABLET ORAL DAILY
Status: DISCONTINUED | OUTPATIENT
Start: 2021-01-01 | End: 2021-01-01 | Stop reason: HOSPADM

## 2021-01-01 RX ORDER — MORPHINE SULFATE 2 MG/ML
2 INJECTION, SOLUTION INTRAMUSCULAR; INTRAVENOUS EVERY 4 HOURS PRN
Status: DISCONTINUED | OUTPATIENT
Start: 2021-01-01 | End: 2021-01-01

## 2021-01-01 RX ORDER — ESCITALOPRAM OXALATE 10 MG/1
10 TABLET ORAL DAILY
Qty: 90 TABLET | Refills: 1 | Status: SHIPPED | OUTPATIENT
Start: 2021-01-01

## 2021-01-01 RX ORDER — KETOROLAC TROMETHAMINE 15 MG/ML
15 INJECTION, SOLUTION INTRAMUSCULAR; INTRAVENOUS EVERY 6 HOURS PRN
Status: DISCONTINUED | OUTPATIENT
Start: 2021-01-01 | End: 2021-01-01 | Stop reason: HOSPADM

## 2021-01-01 RX ORDER — ALBUTEROL SULFATE 2.5 MG/3ML
2.5 SOLUTION RESPIRATORY (INHALATION) ONCE
Status: COMPLETED | OUTPATIENT
Start: 2021-01-01 | End: 2021-01-01

## 2021-01-01 RX ORDER — DEXTROSE, SODIUM CHLORIDE, SODIUM LACTATE, POTASSIUM CHLORIDE, AND CALCIUM CHLORIDE 5; .6; .31; .03; .02 G/100ML; G/100ML; G/100ML; G/100ML; G/100ML
INJECTION, SOLUTION INTRAVENOUS CONTINUOUS
Status: DISCONTINUED | OUTPATIENT
Start: 2021-01-01 | End: 2021-01-01

## 2021-01-01 RX ORDER — PSEUDOEPHEDRINE HCL 30 MG
100 TABLET ORAL 2 TIMES DAILY
Qty: 60 CAPSULE | Refills: 2 | Status: SHIPPED | OUTPATIENT
Start: 2021-01-01

## 2021-01-01 RX ORDER — POTASSIUM CHLORIDE 20 MEQ/1
20 TABLET, EXTENDED RELEASE ORAL ONCE
Status: COMPLETED | OUTPATIENT
Start: 2021-01-01 | End: 2021-01-01

## 2021-01-01 RX ORDER — FENTANYL CITRATE 50 UG/ML
25 INJECTION, SOLUTION INTRAMUSCULAR; INTRAVENOUS EVERY 10 MIN PRN
Status: DISCONTINUED | OUTPATIENT
Start: 2021-01-01 | End: 2021-01-01 | Stop reason: HOSPADM

## 2021-01-01 RX ORDER — UBIDECARENONE 100 MG
100 CAPSULE ORAL DAILY
Status: DISCONTINUED | OUTPATIENT
Start: 2021-01-01 | End: 2021-01-01 | Stop reason: HOSPADM

## 2021-01-01 RX ORDER — AMOXICILLIN AND CLAVULANATE POTASSIUM 875; 125 MG/1; MG/1
1 TABLET, FILM COATED ORAL 2 TIMES DAILY
Qty: 16 TABLET | Refills: 0 | Status: ON HOLD | OUTPATIENT
Start: 2021-01-01 | End: 2021-01-01 | Stop reason: HOSPADM

## 2021-01-01 RX ORDER — ALBUTEROL SULFATE 90 UG/1
2 AEROSOL, METERED RESPIRATORY (INHALATION) EVERY 4 HOURS PRN
Status: DISCONTINUED | OUTPATIENT
Start: 2021-01-01 | End: 2021-01-01 | Stop reason: HOSPADM

## 2021-01-01 RX ORDER — AMLODIPINE BESYLATE 5 MG/1
5 TABLET ORAL DAILY
Status: DISCONTINUED | OUTPATIENT
Start: 2021-01-01 | End: 2021-01-01 | Stop reason: HOSPADM

## 2021-01-01 RX ORDER — UMECLIDINIUM BROMIDE AND VILANTEROL TRIFENATATE 62.5; 25 UG/1; UG/1
1 POWDER RESPIRATORY (INHALATION) DAILY
Qty: 90 PUFF | Refills: 1 | Status: SHIPPED | OUTPATIENT
Start: 2021-01-01 | End: 2021-01-01 | Stop reason: SDUPTHER

## 2021-01-01 RX ADMIN — POTASSIUM CHLORIDE 20 MEQ: 20 TABLET, EXTENDED RELEASE ORAL at 17:33

## 2021-01-01 RX ADMIN — ESCITALOPRAM OXALATE 10 MG: 10 TABLET ORAL at 08:38

## 2021-01-01 RX ADMIN — PHENYLEPHRINE HYDROCHLORIDE 100 MCG: 10 INJECTION INTRAVENOUS at 13:47

## 2021-01-01 RX ADMIN — LIDOCAINE HYDROCHLORIDE 20 MG: 20 INJECTION, SOLUTION INTRAVENOUS at 12:15

## 2021-01-01 RX ADMIN — CELECOXIB 200 MG: 200 CAPSULE ORAL at 08:42

## 2021-01-01 RX ADMIN — PHENYLEPHRINE HYDROCHLORIDE 100 MCG: 10 INJECTION INTRAVENOUS at 15:28

## 2021-01-01 RX ADMIN — METRONIDAZOLE 500 MG: 500 INJECTION, SOLUTION INTRAVENOUS at 12:30

## 2021-01-01 RX ADMIN — SULFAMETHOXAZOLE AND TRIMETHOPRIM 1 TABLET: 800; 160 TABLET ORAL at 08:08

## 2021-01-01 RX ADMIN — DONEPEZIL HYDROCHLORIDE 5 MG: 10 TABLET, FILM COATED ORAL at 20:38

## 2021-01-01 RX ADMIN — Medication 1000 UNITS: at 08:48

## 2021-01-01 RX ADMIN — DOCUSATE SODIUM 100 MG: 100 CAPSULE ORAL at 22:11

## 2021-01-01 RX ADMIN — DONEPEZIL HYDROCHLORIDE 5 MG: 5 TABLET, FILM COATED ORAL at 20:16

## 2021-01-01 RX ADMIN — METRONIDAZOLE 500 MG: 500 INJECTION, SOLUTION INTRAVENOUS at 10:13

## 2021-01-01 RX ADMIN — TRANEXAMIC ACID 1950 MG: 650 TABLET ORAL at 08:41

## 2021-01-01 RX ADMIN — Medication 10 MG: at 14:47

## 2021-01-01 RX ADMIN — METRONIDAZOLE 500 MG: 500 INJECTION, SOLUTION INTRAVENOUS at 10:55

## 2021-01-01 RX ADMIN — DEXAMETHASONE SODIUM PHOSPHATE 4 MG: 10 INJECTION INTRAMUSCULAR; INTRAVENOUS at 15:38

## 2021-01-01 RX ADMIN — SODIUM CHLORIDE, POTASSIUM CHLORIDE, SODIUM LACTATE AND CALCIUM CHLORIDE: 600; 310; 30; 20 INJECTION, SOLUTION INTRAVENOUS at 09:07

## 2021-01-01 RX ADMIN — ASPIRIN 81 MG: 81 TABLET, COATED ORAL at 09:34

## 2021-01-01 RX ADMIN — ATORVASTATIN CALCIUM 20 MG: 20 TABLET, FILM COATED ORAL at 08:38

## 2021-01-01 RX ADMIN — ENOXAPARIN SODIUM 40 MG: 40 INJECTION SUBCUTANEOUS at 08:04

## 2021-01-01 RX ADMIN — DOCUSATE SODIUM 50 MG AND SENNOSIDES 8.6 MG 1 TABLET: 8.6; 5 TABLET, FILM COATED ORAL at 20:14

## 2021-01-01 RX ADMIN — SODIUM CHLORIDE, POTASSIUM CHLORIDE, SODIUM LACTATE AND CALCIUM CHLORIDE: 600; 310; 30; 20 INJECTION, SOLUTION INTRAVENOUS at 17:00

## 2021-01-01 RX ADMIN — PIPERACILLIN AND TAZOBACTAM 3375 MG: 3; .375 INJECTION, POWDER, LYOPHILIZED, FOR SOLUTION INTRAVENOUS at 17:33

## 2021-01-01 RX ADMIN — DONEPEZIL HYDROCHLORIDE 5 MG: 5 TABLET, FILM COATED ORAL at 22:25

## 2021-01-01 RX ADMIN — GLYCOPYRROLATE AND FORMOTEROL FUMARATE 2 PUFF: 9; 4.8 AEROSOL, METERED RESPIRATORY (INHALATION) at 11:02

## 2021-01-01 RX ADMIN — SODIUM CHLORIDE, POTASSIUM CHLORIDE, SODIUM LACTATE AND CALCIUM CHLORIDE 500 ML: 600; 310; 30; 20 INJECTION, SOLUTION INTRAVENOUS at 12:01

## 2021-01-01 RX ADMIN — SODIUM CHLORIDE, PRESERVATIVE FREE 10 ML: 5 INJECTION INTRAVENOUS at 12:08

## 2021-01-01 RX ADMIN — LACTOBACILLUS TAB 4 TABLET: TAB at 12:29

## 2021-01-01 RX ADMIN — ACETAMINOPHEN 1000 MG: 500 TABLET ORAL at 18:05

## 2021-01-01 RX ADMIN — ESCITALOPRAM OXALATE 10 MG: 10 TABLET ORAL at 18:51

## 2021-01-01 RX ADMIN — CIPROFLOXACIN 400 MG: 2 INJECTION, SOLUTION INTRAVENOUS at 22:27

## 2021-01-01 RX ADMIN — LACTOBACILLUS TAB 4 TABLET: TAB at 08:38

## 2021-01-01 RX ADMIN — LACTULOSE 20 G: 20 SOLUTION ORAL at 22:25

## 2021-01-01 RX ADMIN — ACETAMINOPHEN 1000 MG: 500 TABLET ORAL at 12:07

## 2021-01-01 RX ADMIN — DOCUSATE SODIUM 100 MG: 100 CAPSULE ORAL at 09:30

## 2021-01-01 RX ADMIN — POTASSIUM CHLORIDE 40 MEQ: 20 TABLET, EXTENDED RELEASE ORAL at 11:19

## 2021-01-01 RX ADMIN — GLYCOPYRROLATE AND FORMOTEROL FUMARATE 2 PUFF: 9; 4.8 AEROSOL, METERED RESPIRATORY (INHALATION) at 07:28

## 2021-01-01 RX ADMIN — PIPERACILLIN AND TAZOBACTAM 3375 MG: 3; .375 INJECTION, POWDER, LYOPHILIZED, FOR SOLUTION INTRAVENOUS at 08:38

## 2021-01-01 RX ADMIN — TRANEXAMIC ACID 1950 MG: 650 TABLET ORAL at 17:06

## 2021-01-01 RX ADMIN — METOPROLOL TARTRATE 25 MG: 25 TABLET, FILM COATED ORAL at 12:00

## 2021-01-01 RX ADMIN — METOPROLOL SUCCINATE 12.5 MG: 25 TABLET, EXTENDED RELEASE ORAL at 08:08

## 2021-01-01 RX ADMIN — BUPIVACAINE HYDROCHLORIDE 30 ML: 2.5 INJECTION, SOLUTION EPIDURAL; INFILTRATION; INTRACAUDAL; PERINEURAL at 11:06

## 2021-01-01 RX ADMIN — METRONIDAZOLE 500 MG: 500 INJECTION, SOLUTION INTRAVENOUS at 01:58

## 2021-01-01 RX ADMIN — Medication 1000 UNITS: at 12:07

## 2021-01-01 RX ADMIN — LACTOBACILLUS TAB 4 TABLET: TAB at 21:05

## 2021-01-01 RX ADMIN — APIXABAN 5 MG: 5 TABLET, FILM COATED ORAL at 09:30

## 2021-01-01 RX ADMIN — METRONIDAZOLE 500 MG: 500 INJECTION, SOLUTION INTRAVENOUS at 14:39

## 2021-01-01 RX ADMIN — ASPIRIN 324 MG: 81 TABLET, CHEWABLE ORAL at 14:11

## 2021-01-01 RX ADMIN — POTASSIUM & SODIUM PHOSPHATES POWDER PACK 280-160-250 MG 250 MG: 280-160-250 PACK at 12:29

## 2021-01-01 RX ADMIN — METRONIDAZOLE 500 MG: 500 INJECTION, SOLUTION INTRAVENOUS at 09:30

## 2021-01-01 RX ADMIN — DONEPEZIL HYDROCHLORIDE 5 MG: 5 TABLET, FILM COATED ORAL at 21:27

## 2021-01-01 RX ADMIN — METRONIDAZOLE 500 MG: 500 INJECTION, SOLUTION INTRAVENOUS at 00:56

## 2021-01-01 RX ADMIN — Medication 1000 UNITS: at 08:38

## 2021-01-01 RX ADMIN — ESCITALOPRAM OXALATE 10 MG: 10 TABLET ORAL at 12:07

## 2021-01-01 RX ADMIN — DOCUSATE SODIUM 100 MG: 100 CAPSULE ORAL at 08:07

## 2021-01-01 RX ADMIN — PIPERACILLIN AND TAZOBACTAM 3375 MG: 3; .375 INJECTION, POWDER, LYOPHILIZED, FOR SOLUTION INTRAVENOUS at 17:36

## 2021-01-01 RX ADMIN — METRONIDAZOLE 500 MG: 500 INJECTION, SOLUTION INTRAVENOUS at 20:39

## 2021-01-01 RX ADMIN — APIXABAN 5 MG: 5 TABLET, FILM COATED ORAL at 21:27

## 2021-01-01 RX ADMIN — DONEPEZIL HYDROCHLORIDE 5 MG: 5 TABLET, FILM COATED ORAL at 20:39

## 2021-01-01 RX ADMIN — ANTACID TABLETS 500 MG: 500 TABLET, CHEWABLE ORAL at 20:06

## 2021-01-01 RX ADMIN — ATORVASTATIN CALCIUM 20 MG: 20 TABLET, FILM COATED ORAL at 09:34

## 2021-01-01 RX ADMIN — METOPROLOL SUCCINATE 12.5 MG: 25 TABLET, EXTENDED RELEASE ORAL at 21:32

## 2021-01-01 RX ADMIN — Medication 10 ML: at 10:59

## 2021-01-01 RX ADMIN — Medication 10 MG: at 13:20

## 2021-01-01 RX ADMIN — Medication 10 ML: at 08:05

## 2021-01-01 RX ADMIN — AMOXICILLIN AND CLAVULANATE POTASSIUM 1 TABLET: 875; 125 TABLET, FILM COATED ORAL at 09:34

## 2021-01-01 RX ADMIN — SODIUM CHLORIDE, SODIUM LACTATE, POTASSIUM CHLORIDE, CALCIUM CHLORIDE AND DEXTROSE MONOHYDRATE: 5; 600; 310; 30; 20 INJECTION, SOLUTION INTRAVENOUS at 18:08

## 2021-01-01 RX ADMIN — DOCUSATE SODIUM 100 MG: 100 CAPSULE ORAL at 06:50

## 2021-01-01 RX ADMIN — ATORVASTATIN CALCIUM 20 MG: 20 TABLET, FILM COATED ORAL at 22:16

## 2021-01-01 RX ADMIN — FLECAINIDE ACETATE 50 MG: 100 TABLET ORAL at 08:08

## 2021-01-01 RX ADMIN — SULFAMETHOXAZOLE AND TRIMETHOPRIM 1 TABLET: 800; 160 TABLET ORAL at 10:54

## 2021-01-01 RX ADMIN — ESCITALOPRAM OXALATE 10 MG: 20 TABLET ORAL at 20:39

## 2021-01-01 RX ADMIN — I-VITE, TAB 1000-60-2MG (60/BT) 1 TABLET: TAB at 18:51

## 2021-01-01 RX ADMIN — ENOXAPARIN SODIUM 40 MG: 40 INJECTION SUBCUTANEOUS at 17:33

## 2021-01-01 RX ADMIN — SODIUM CHLORIDE, SODIUM LACTATE, POTASSIUM CHLORIDE, CALCIUM CHLORIDE AND DEXTROSE MONOHYDRATE: 5; 600; 310; 30; 20 INJECTION, SOLUTION INTRAVENOUS at 14:30

## 2021-01-01 RX ADMIN — SODIUM CHLORIDE, POTASSIUM CHLORIDE, SODIUM LACTATE AND CALCIUM CHLORIDE: 600; 310; 30; 20 INJECTION, SOLUTION INTRAVENOUS at 18:05

## 2021-01-01 RX ADMIN — ENOXAPARIN SODIUM 40 MG: 40 INJECTION SUBCUTANEOUS at 21:46

## 2021-01-01 RX ADMIN — CEFAZOLIN SODIUM 2000 MG: 2 SOLUTION INTRAVENOUS at 20:00

## 2021-01-01 RX ADMIN — Medication 10 ML: at 21:07

## 2021-01-01 RX ADMIN — I-VITE, TAB 1000-60-2MG (60/BT) 1 TABLET: TAB at 12:06

## 2021-01-01 RX ADMIN — ATORVASTATIN CALCIUM 20 MG: 20 TABLET, FILM COATED ORAL at 20:39

## 2021-01-01 RX ADMIN — PIPERACILLIN AND TAZOBACTAM 3375 MG: 3; .375 INJECTION, POWDER, LYOPHILIZED, FOR SOLUTION INTRAVENOUS at 00:23

## 2021-01-01 RX ADMIN — SULFAMETHOXAZOLE AND TRIMETHOPRIM 1 TABLET: 800; 160 TABLET ORAL at 22:10

## 2021-01-01 RX ADMIN — CYANOCOBALAMIN TAB 500 MCG 500 MCG: 500 TAB at 08:03

## 2021-01-01 RX ADMIN — ANTACID TABLETS 500 MG: 500 TABLET, CHEWABLE ORAL at 12:07

## 2021-01-01 RX ADMIN — LACTOBACILLUS TAB 4 TABLET: TAB at 08:47

## 2021-01-01 RX ADMIN — GLYCOPYRROLATE AND FORMOTEROL FUMARATE 2 PUFF: 9; 4.8 AEROSOL, METERED RESPIRATORY (INHALATION) at 21:21

## 2021-01-01 RX ADMIN — BUPIVACAINE HYDROCHLORIDE 1.8 ML: 7.5 INJECTION, SOLUTION INTRASPINAL at 11:57

## 2021-01-01 RX ADMIN — PHENYLEPHRINE HYDROCHLORIDE 100 MCG: 10 INJECTION INTRAVENOUS at 14:32

## 2021-01-01 RX ADMIN — IOPAMIDOL 100 ML: 755 INJECTION, SOLUTION INTRAVENOUS at 17:47

## 2021-01-01 RX ADMIN — SODIUM CHLORIDE 1000 ML: 9 INJECTION, SOLUTION INTRAVENOUS at 12:47

## 2021-01-01 RX ADMIN — OXYCODONE HYDROCHLORIDE AND ACETAMINOPHEN 500 MG: 500 TABLET ORAL at 18:51

## 2021-01-01 RX ADMIN — Medication 10 MG: at 13:43

## 2021-01-01 RX ADMIN — GLYCOPYRROLATE AND FORMOTEROL FUMARATE 2 PUFF: 9; 4.8 AEROSOL, METERED RESPIRATORY (INHALATION) at 11:39

## 2021-01-01 RX ADMIN — PIPERACILLIN AND TAZOBACTAM 3375 MG: 3; .375 INJECTION, POWDER, LYOPHILIZED, FOR SOLUTION INTRAVENOUS at 08:03

## 2021-01-01 RX ADMIN — LACTOBACILLUS TAB 4 TABLET: TAB at 20:38

## 2021-01-01 RX ADMIN — DOCUSATE SODIUM 100 MG: 100 CAPSULE ORAL at 20:39

## 2021-01-01 RX ADMIN — Medication 10 ML: at 20:39

## 2021-01-01 RX ADMIN — ESCITALOPRAM OXALATE 10 MG: 20 TABLET ORAL at 21:28

## 2021-01-01 RX ADMIN — Medication 10 MG: at 12:52

## 2021-01-01 RX ADMIN — DILTIAZEM HYDROCHLORIDE 20 MG: 5 INJECTION, SOLUTION INTRAVENOUS at 05:42

## 2021-01-01 RX ADMIN — SODIUM CHLORIDE, PRESERVATIVE FREE 10 ML: 5 INJECTION INTRAVENOUS at 17:34

## 2021-01-01 RX ADMIN — LACTULOSE 20 G: 20 SOLUTION ORAL at 16:36

## 2021-01-01 RX ADMIN — DOCUSATE SODIUM 100 MG: 100 CAPSULE ORAL at 11:55

## 2021-01-01 RX ADMIN — DONEPEZIL HYDROCHLORIDE 5 MG: 5 TABLET, FILM COATED ORAL at 22:11

## 2021-01-01 RX ADMIN — ESCITALOPRAM OXALATE 10 MG: 20 TABLET ORAL at 22:10

## 2021-01-01 RX ADMIN — PIPERACILLIN AND TAZOBACTAM 3375 MG: 3; .375 INJECTION, POWDER, LYOPHILIZED, FOR SOLUTION INTRAVENOUS at 17:18

## 2021-01-01 RX ADMIN — CYANOCOBALAMIN TAB 500 MCG 500 MCG: 500 TAB at 08:38

## 2021-01-01 RX ADMIN — LACTOBACILLUS TAB 4 TABLET: TAB at 08:03

## 2021-01-01 RX ADMIN — METOPROLOL SUCCINATE 12.5 MG: 25 TABLET, EXTENDED RELEASE ORAL at 09:30

## 2021-01-01 RX ADMIN — SODIUM CHLORIDE, SODIUM LACTATE, POTASSIUM CHLORIDE, CALCIUM CHLORIDE AND DEXTROSE MONOHYDRATE: 5; 600; 310; 30; 20 INJECTION, SOLUTION INTRAVENOUS at 04:17

## 2021-01-01 RX ADMIN — OXYCODONE HYDROCHLORIDE 10 MG: 10 TABLET, FILM COATED, EXTENDED RELEASE ORAL at 08:42

## 2021-01-01 RX ADMIN — Medication 1000 UNITS: at 18:51

## 2021-01-01 RX ADMIN — DILTIAZEM HYDROCHLORIDE 20 MG: 5 INJECTION, SOLUTION INTRAVENOUS at 03:36

## 2021-01-01 RX ADMIN — ESCITALOPRAM OXALATE 10 MG: 10 TABLET ORAL at 08:48

## 2021-01-01 RX ADMIN — PIPERACILLIN AND TAZOBACTAM 3375 MG: 3; .375 INJECTION, POWDER, LYOPHILIZED, FOR SOLUTION INTRAVENOUS at 00:49

## 2021-01-01 RX ADMIN — ASPIRIN 81 MG: 81 TABLET, COATED ORAL at 12:06

## 2021-01-01 RX ADMIN — CEFAZOLIN SODIUM 2000 MG: 2 SOLUTION INTRAVENOUS at 12:12

## 2021-01-01 RX ADMIN — ATORVASTATIN CALCIUM 20 MG: 20 TABLET, FILM COATED ORAL at 21:27

## 2021-01-01 RX ADMIN — ACETAMINOPHEN 1000 MG: 500 TABLET ORAL at 08:42

## 2021-01-01 RX ADMIN — PIPERACILLIN AND TAZOBACTAM 3375 MG: 3; .375 INJECTION, POWDER, LYOPHILIZED, FOR SOLUTION INTRAVENOUS at 08:47

## 2021-01-01 RX ADMIN — SODIUM CHLORIDE, POTASSIUM CHLORIDE, SODIUM LACTATE AND CALCIUM CHLORIDE: 600; 310; 30; 20 INJECTION, SOLUTION INTRAVENOUS at 12:48

## 2021-01-01 RX ADMIN — TRANEXAMIC ACID 1950 MG: 650 TABLET ORAL at 06:30

## 2021-01-01 RX ADMIN — ASPIRIN 81 MG: 81 TABLET, COATED ORAL at 09:50

## 2021-01-01 RX ADMIN — ASPIRIN 81 MG: 81 TABLET, COATED ORAL at 10:54

## 2021-01-01 RX ADMIN — Medication 1 DROP: at 16:20

## 2021-01-01 RX ADMIN — CYANOCOBALAMIN TAB 500 MCG 500 MCG: 500 TAB at 08:47

## 2021-01-01 RX ADMIN — APIXABAN 5 MG: 5 TABLET, FILM COATED ORAL at 08:07

## 2021-01-01 RX ADMIN — FLUTICASONE PROPIONATE 2 SPRAY: 50 SPRAY, METERED NASAL at 12:09

## 2021-01-01 RX ADMIN — ALBUTEROL SULFATE 2 PUFF: 90 AEROSOL, METERED RESPIRATORY (INHALATION) at 07:21

## 2021-01-01 RX ADMIN — SULFAMETHOXAZOLE AND TRIMETHOPRIM 1 TABLET: 800; 160 TABLET ORAL at 09:30

## 2021-01-01 RX ADMIN — Medication 10 MG: at 12:38

## 2021-01-01 RX ADMIN — POTASSIUM CHLORIDE 40 MEQ: 1500 TABLET, EXTENDED RELEASE ORAL at 10:54

## 2021-01-01 RX ADMIN — Medication 10 ML: at 08:39

## 2021-01-01 RX ADMIN — AMLODIPINE BESYLATE 5 MG: 5 TABLET ORAL at 12:07

## 2021-01-01 RX ADMIN — METRONIDAZOLE 500 MG: 500 INJECTION, SOLUTION INTRAVENOUS at 05:04

## 2021-01-01 RX ADMIN — LIDOCAINE HYDROCHLORIDE 0.1 ML: 10 INJECTION, SOLUTION EPIDURAL; INFILTRATION; INTRACAUDAL; PERINEURAL at 09:07

## 2021-01-01 RX ADMIN — Medication 1000 UNITS: at 08:03

## 2021-01-01 RX ADMIN — ATORVASTATIN CALCIUM 20 MG: 20 TABLET, FILM COATED ORAL at 20:06

## 2021-01-01 RX ADMIN — SULFAMETHOXAZOLE AND TRIMETHOPRIM 1 TABLET: 800; 160 TABLET ORAL at 21:28

## 2021-01-01 RX ADMIN — APIXABAN 5 MG: 5 TABLET, FILM COATED ORAL at 20:39

## 2021-01-01 RX ADMIN — METRONIDAZOLE 500 MG: 500 INJECTION, SOLUTION INTRAVENOUS at 02:58

## 2021-01-01 RX ADMIN — DOCUSATE SODIUM 50 MG AND SENNOSIDES 8.6 MG 1 TABLET: 8.6; 5 TABLET, FILM COATED ORAL at 12:07

## 2021-01-01 RX ADMIN — METRONIDAZOLE 500 MG: 500 INJECTION, SOLUTION INTRAVENOUS at 18:53

## 2021-01-01 RX ADMIN — CEFAZOLIN SODIUM 2000 MG: 2 SOLUTION INTRAVENOUS at 04:37

## 2021-01-01 RX ADMIN — GLYCOPYRROLATE AND FORMOTEROL FUMARATE 2 PUFF: 9; 4.8 AEROSOL, METERED RESPIRATORY (INHALATION) at 19:00

## 2021-01-01 RX ADMIN — ENOXAPARIN SODIUM 40 MG: 40 INJECTION SUBCUTANEOUS at 08:39

## 2021-01-01 RX ADMIN — ACETAMINOPHEN 1000 MG: 500 TABLET ORAL at 02:00

## 2021-01-01 RX ADMIN — DONEPEZIL HYDROCHLORIDE 5 MG: 10 TABLET, FILM COATED ORAL at 21:05

## 2021-01-01 RX ADMIN — FLECAINIDE ACETATE 50 MG: 100 TABLET ORAL at 12:16

## 2021-01-01 RX ADMIN — SODIUM CHLORIDE, PRESERVATIVE FREE 10 ML: 5 INJECTION INTRAVENOUS at 11:58

## 2021-01-01 RX ADMIN — IRON SUCROSE 300 MG: 20 INJECTION, SOLUTION INTRAVENOUS at 15:51

## 2021-01-01 RX ADMIN — LACTOBACILLUS TAB 4 TABLET: TAB at 17:32

## 2021-01-01 RX ADMIN — FLECAINIDE ACETATE 50 MG: 100 TABLET ORAL at 20:39

## 2021-01-01 RX ADMIN — OXYCODONE HYDROCHLORIDE AND ACETAMINOPHEN 500 MG: 500 TABLET ORAL at 12:07

## 2021-01-01 RX ADMIN — IOPAMIDOL 100 ML: 755 INJECTION, SOLUTION INTRAVENOUS at 11:58

## 2021-01-01 RX ADMIN — ENOXAPARIN SODIUM 40 MG: 40 INJECTION SUBCUTANEOUS at 08:48

## 2021-01-01 RX ADMIN — MELOXICAM 3.75 MG: 7.5 TABLET ORAL at 18:51

## 2021-01-01 RX ADMIN — ENOXAPARIN SODIUM 40 MG: 40 INJECTION SUBCUTANEOUS at 10:54

## 2021-01-01 RX ADMIN — PIPERACILLIN AND TAZOBACTAM 3375 MG: 3; .375 INJECTION, POWDER, LYOPHILIZED, FOR SOLUTION INTRAVENOUS at 02:51

## 2021-01-01 RX ADMIN — CIPROFLOXACIN 400 MG: 2 INJECTION, SOLUTION INTRAVENOUS at 09:51

## 2021-01-01 RX ADMIN — Medication 100 MG: at 12:07

## 2021-01-01 RX ADMIN — ENOXAPARIN SODIUM 40 MG: 40 INJECTION SUBCUTANEOUS at 09:34

## 2021-01-01 RX ADMIN — Medication 100 MG: at 18:51

## 2021-01-01 RX ADMIN — PHENYLEPHRINE HYDROCHLORIDE 100 MCG: 10 INJECTION INTRAVENOUS at 14:04

## 2021-01-01 RX ADMIN — LACTULOSE 20 G: 20 SOLUTION ORAL at 22:16

## 2021-01-01 RX ADMIN — PROPOFOL 75 MCG/KG/MIN: 10 INJECTION, EMULSION INTRAVENOUS at 12:15

## 2021-01-01 RX ADMIN — FLECAINIDE ACETATE 50 MG: 100 TABLET ORAL at 21:26

## 2021-01-01 RX ADMIN — DOCUSATE SODIUM 100 MG: 100 CAPSULE ORAL at 21:26

## 2021-01-01 RX ADMIN — ENOXAPARIN SODIUM 40 MG: 40 INJECTION SUBCUTANEOUS at 09:51

## 2021-01-01 RX ADMIN — ESCITALOPRAM OXALATE 10 MG: 20 TABLET ORAL at 09:34

## 2021-01-01 RX ADMIN — ALBUTEROL SULFATE 2.5 MG: 2.5 SOLUTION RESPIRATORY (INHALATION) at 10:10

## 2021-01-01 RX ADMIN — DOCUSATE SODIUM 100 MG: 100 CAPSULE ORAL at 10:54

## 2021-01-01 RX ADMIN — POTASSIUM CHLORIDE 40 MEQ: 1500 TABLET, EXTENDED RELEASE ORAL at 12:00

## 2021-01-01 RX ADMIN — ASPIRIN 81 MG: 81 TABLET, COATED ORAL at 20:07

## 2021-01-01 RX ADMIN — ESCITALOPRAM OXALATE 10 MG: 10 TABLET ORAL at 08:03

## 2021-01-01 RX ADMIN — METRONIDAZOLE 500 MG: 500 INJECTION, SOLUTION INTRAVENOUS at 18:40

## 2021-01-01 RX ADMIN — POTASSIUM & SODIUM PHOSPHATES POWDER PACK 280-160-250 MG 250 MG: 280-160-250 PACK at 15:52

## 2021-01-01 RX ADMIN — ATORVASTATIN CALCIUM 20 MG: 20 TABLET, FILM COATED ORAL at 21:05

## 2021-01-01 RX ADMIN — SULFAMETHOXAZOLE AND TRIMETHOPRIM 1 TABLET: 800; 160 TABLET ORAL at 20:39

## 2021-01-01 SDOH — ECONOMIC STABILITY: FOOD INSECURITY: WITHIN THE PAST 12 MONTHS, THE FOOD YOU BOUGHT JUST DIDN'T LAST AND YOU DIDN'T HAVE MONEY TO GET MORE.: NEVER TRUE

## 2021-01-01 SDOH — ECONOMIC STABILITY: FOOD INSECURITY: WITHIN THE PAST 12 MONTHS, YOU WORRIED THAT YOUR FOOD WOULD RUN OUT BEFORE YOU GOT MONEY TO BUY MORE.: NEVER TRUE

## 2021-01-01 ASSESSMENT — PULMONARY FUNCTION TESTS
PIF_VALUE: 0
PIF_VALUE: 0
PIF_VALUE: 1
PIF_VALUE: 0
PIF_VALUE: 1
PIF_VALUE: 1
PIF_VALUE: 0
PIF_VALUE: 1
PIF_VALUE: 0
PIF_VALUE: 1
PIF_VALUE: 0
PIF_VALUE: 2
PIF_VALUE: 0
PIF_VALUE: 1
PIF_VALUE: 0
PIF_VALUE: 1
PIF_VALUE: 0
PIF_VALUE: 1
PIF_VALUE: 0
PIF_VALUE: 0
PIF_VALUE: 1
PIF_VALUE: 0
PIF_VALUE: 1
PIF_VALUE: 0
PIF_VALUE: 1
PIF_VALUE: 0

## 2021-01-01 ASSESSMENT — ENCOUNTER SYMPTOMS
SORE THROAT: 0
VOMITING: 1
ABDOMINAL PAIN: 0
ANAL BLEEDING: 0
CHEST TIGHTNESS: 0
EYES NEGATIVE: 1
VOMITING: 0
ABDOMINAL PAIN: 0
BACK PAIN: 0
VOICE CHANGE: 0
SINUS PRESSURE: 0
ABDOMINAL DISTENTION: 0
NAUSEA: 0
NAUSEA: 0
RECTAL PAIN: 0
PHOTOPHOBIA: 0
DIARRHEA: 0
BACK PAIN: 0
BLOOD IN STOOL: 0
SHORTNESS OF BREATH: 0
COLOR CHANGE: 0
SORE THROAT: 0
NAUSEA: 0
EYE ITCHING: 0
SINUS PAIN: 0
EYE ITCHING: 0
EYE DISCHARGE: 0
CHOKING: 0
DIARRHEA: 0
EYE ITCHING: 0
RHINORRHEA: 0
CHEST TIGHTNESS: 0
TROUBLE SWALLOWING: 0
COUGH: 0
WHEEZING: 0
ABDOMINAL PAIN: 1
TROUBLE SWALLOWING: 0
NAUSEA: 0
ABDOMINAL PAIN: 0
ANAL BLEEDING: 1
COUGH: 0
COLOR CHANGE: 0
RHINORRHEA: 0
SINUS PRESSURE: 0
SINUS PRESSURE: 0
COUGH: 0
VOMITING: 0
EYE DISCHARGE: 0
SHORTNESS OF BREATH: 1
WHEEZING: 0
VOMITING: 0
VOICE CHANGE: 0
TROUBLE SWALLOWING: 0
NAUSEA: 0
SHORTNESS OF BREATH: 1
SHORTNESS OF BREATH: 0
TROUBLE SWALLOWING: 0
WHEEZING: 1
DIARRHEA: 0
DIARRHEA: 0
NAUSEA: 0
SORE THROAT: 0
ABDOMINAL PAIN: 1
ABDOMINAL PAIN: 0
COUGH: 0
CHEST TIGHTNESS: 0
VOMITING: 0
CONSTIPATION: 0
ABDOMINAL DISTENTION: 0
SORE THROAT: 0
RHINORRHEA: 0
VOICE CHANGE: 0
CHEST TIGHTNESS: 0
DIARRHEA: 0
CONSTIPATION: 0
NAUSEA: 1
TROUBLE SWALLOWING: 0
RHINORRHEA: 0
EYE DISCHARGE: 0
EYE DISCHARGE: 0
SHORTNESS OF BREATH: 0
DIARRHEA: 0
SINUS PRESSURE: 0
SHORTNESS OF BREATH: 0
SHORTNESS OF BREATH: 1
VOICE CHANGE: 0
CHOKING: 0
SORE THROAT: 0
COUGH: 0
VOMITING: 0
VOMITING: 0

## 2021-01-01 ASSESSMENT — PAIN SCALES - GENERAL
PAINLEVEL_OUTOF10: 0
PAINLEVEL_OUTOF10: 3
PAINLEVEL_OUTOF10: 0
PAINLEVEL_OUTOF10: 7
PAINLEVEL_OUTOF10: 0
PAINLEVEL_OUTOF10: 3
PAINLEVEL_OUTOF10: 0
PAINLEVEL_OUTOF10: 3
PAINLEVEL_OUTOF10: 0

## 2021-01-01 ASSESSMENT — PAIN DESCRIPTION - LOCATION
LOCATION: GENERALIZED
LOCATION: ABDOMEN
LOCATION: GENERALIZED
LOCATION: GENERALIZED
LOCATION: HIP
LOCATION: GENERALIZED
LOCATION: HIP
LOCATION: HIP

## 2021-01-01 ASSESSMENT — PAIN DESCRIPTION - PAIN TYPE
TYPE: ACUTE PAIN;CHRONIC PAIN
TYPE: ACUTE PAIN;SURGICAL PAIN
TYPE: ACUTE PAIN;CHRONIC PAIN
TYPE: ACUTE PAIN
TYPE: ACUTE PAIN;CHRONIC PAIN
TYPE: ACUTE PAIN;CHRONIC PAIN
TYPE: ACUTE PAIN

## 2021-01-01 ASSESSMENT — PATIENT HEALTH QUESTIONNAIRE - PHQ9
SUM OF ALL RESPONSES TO PHQ9 QUESTIONS 1 & 2: 0
2. FEELING DOWN, DEPRESSED OR HOPELESS: 0
SUM OF ALL RESPONSES TO PHQ QUESTIONS 1-9: 0
SUM OF ALL RESPONSES TO PHQ QUESTIONS 1-9: 0

## 2021-01-01 ASSESSMENT — LIFESTYLE VARIABLES
AUDIT TOTAL SCORE: INCOMPLETE
AUDIT-C TOTAL SCORE: INCOMPLETE
HOW OFTEN DO YOU HAVE A DRINK CONTAINING ALCOHOL: NEVER

## 2021-01-01 ASSESSMENT — PAIN DESCRIPTION - ORIENTATION
ORIENTATION: LEFT
ORIENTATION: LEFT

## 2021-01-01 ASSESSMENT — VISUAL ACUITY: OU: 1

## 2021-01-01 ASSESSMENT — SOCIAL DETERMINANTS OF HEALTH (SDOH): HOW HARD IS IT FOR YOU TO PAY FOR THE VERY BASICS LIKE FOOD, HOUSING, MEDICAL CARE, AND HEATING?: NOT HARD AT ALL

## 2021-01-01 ASSESSMENT — PAIN DESCRIPTION - FREQUENCY: FREQUENCY: CONTINUOUS

## 2021-01-01 ASSESSMENT — PAIN - FUNCTIONAL ASSESSMENT: PAIN_FUNCTIONAL_ASSESSMENT: 0-10

## 2021-01-01 ASSESSMENT — PAIN DESCRIPTION - DESCRIPTORS: DESCRIPTORS: ACHING

## 2021-02-05 NOTE — TELEPHONE ENCOUNTER
Patient needs a 90 day supply sent to mail order please. You gave her a 30 day supply and she told them not to fill it.   She will save a lot of money with the 90 day supply

## 2021-02-24 NOTE — TELEPHONE ENCOUNTER
Rx request   Requested Prescriptions     Pending Prescriptions Disp Refills    alendronate (FOSAMAX) 70 MG tablet 12 tablet 4     Sig: Take 1 tablet by mouth every 7 days    amLODIPine (NORVASC) 5 MG tablet 90 tablet 4     Sig: TAKE 1 TABLET BY MOUTH DAILY.     atorvastatin (LIPITOR) 20 MG tablet 90 tablet 4     Sig: Take 1 tablet by mouth daily    escitalopram (LEXAPRO) 10 MG tablet 90 tablet 1     Sig: Take 1 tablet by mouth daily     LOV 12/22/2020  Next Visit Date:  Future Appointments   Date Time Provider Lily Mathis   3/25/2021  4:00 PM Ta Byrnes, [address] [facility],[address]   4/22/2021  8:30 AM Ash Tristan MD [facility]   9/18/2021  9:00 AM Dariana Rodriguez ULTRASOUND 2 MLOZ ULTRA M[facility] Fac RAD   9/29/2021  1:15 PM Vane Clark MD [facility]

## 2021-03-25 PROBLEM — E66.9 OBESITY (BMI 30-39.9): Status: ACTIVE | Noted: 2021-01-01

## 2021-03-25 NOTE — PROGRESS NOTES
Subjective: Dennis Woods is a 68 y.o. female who complains today of:     Chief Complaint   Patient presents with    Follow-up     four month f/u for COPD and Hypoxia on O2. HPI  She is using 2 lit  With sleep and prn during daytime  C/o shortness of breath  with exertion. Occasional Wheezing   No Cough with  Sputum  No Hemoptysis  No Chest tightness   No Chest pain with radiation  or pleuritic pain  No Fever or chills. No Rhinorrhea and postnasal drip. She is  bronchodilator with Albuterol HFA prn bases, Anoro Ellipta 1 puff daily. Allergies:  Patient has no known allergies.   Past Medical History:   Diagnosis Date    COPD [facility]chronic obstructive [facility] disease) [facility]     Dyspnea on exertion 7/31/2019    Hypertension     Impaired cognition     Irritable bowel syndrome without diarrhea 12/22/2020    Mild cognitive impairment with memory loss 9/25/2020    Osteoarthritis     Tobacco abuse 5/6/2019    Tobacco use disorder 5/6/2019    Vascular dementia ([facility] 6/22/2020     Past Surgical History:   Procedure Laterality Date    EYE SURGERY  2010    PER PROBLEM SHEET    HYSTERECTOMY  1975    PER PROBLEM SHEET    LITHOTRIPSY  2004    PER PROBLEM SHEET    VEIN SURGERY  1988    PER PROBLEM SHEET     Family History   Problem Relation Age of Onset    Diabetes Other         GRANDMOTHER     Social History     Socioeconomic History    Marital status:      Spouse name: Not on file    Number of children: Not on file    Years of education: Not on file    Highest education level: Not on file   Occupational History    Not on file   Social Needs    Financial resource strain: Not on file    Food insecurity     Worry: Never true     Inability: Not on file    Transportation needs     Medical: No     Non-medical: No   Tobacco Use    Smoking status: Former Smoker     Packs/day: 0.75     Years: 45.00     Pack years: 33.75     Types: Cigarettes     Start date: 1967     Quit date: 2/1/2019 Years since quittin.1    Smokeless tobacco: Never Used   Substance and Sexual Activity    Alcohol use: No     Comment: social    Drug use: No    Sexual activity: Not on file   Lifestyle    Physical activity     Days per week: 0 days     Minutes per session: 0 min    Stress: Only a little   Relationships    Social connections     Talks on phone: More than three times a week     Gets together: More than three times a week     Attends Yazidism service: More than 4 times per year     Active member of club or organization: No     Attends meetings of clubs or organizations: Never     Relationship status:     Intimate partner violence     Fear of current or ex partner: No     Emotionally abused: No     Physically abused: No     Forced sexual activity: No   Other Topics Concern    Not on file   Social History Narrative         Lives With: Alone son is helpful and is her POA for health care    Type of Home: House    Home Layout: One level    Home Access: Stairs to enter with rails    Entrance Stairs - Number of Steps: 2    Bathroom Shower/Tub: Tub/Shower unit, Walk-in shower    Bathroom Equipment: Hand-held shower    ADL Assistance: Independent    Homemaking Assistance: Independent    Ambulation Assistance: Independent(No AD)    Transfer Assistance: Independent    Active : Yes    Additional Comments: Pt. reports she has fallen at home but not recently and not regularly. Pt. having word finding difficulty, but sons are present and clarify pt's answers         Review of Systems   Constitutional: Negative for chills, diaphoresis, fatigue and fever. HENT: Negative for congestion, mouth sores, nosebleeds, postnasal drip, rhinorrhea, sinus pressure, sneezing, sore throat, trouble swallowing and voice change. Eyes: Negative for discharge, itching and visual disturbance. Respiratory: Positive for shortness of breath. Negative for cough, chest tightness and wheezing.     Cardiovascular: Negative for chest pain, palpitations and leg swelling. Gastrointestinal: Negative for abdominal pain, diarrhea, nausea and vomiting. Genitourinary: Negative for difficulty urinating and hematuria. Musculoskeletal: Negative for arthralgias, joint swelling and myalgias. Skin: Negative for rash. Allergic/Immunologic: Negative for environmental allergies and food allergies. Neurological: Negative for dizziness, tremors, weakness and headaches. Psychiatric/Behavioral: Negative for behavioral problems and sleep disturbance.         :     Vitals:    03/25/21 1603   BP: (!) 138/92   Pulse: 75   Resp: 18   Temp: 97.1 °F (36.2 °C)   SpO2: 91%   Weight: 170 lb (77.1 kg)   Height: 5' 1\" (1.549 m)     Wt Readings from Last 3 Encounters:   03/25/21 170 lb (77.1 kg)   09/28/20 172 lb 12.8 oz (78.4 kg)   02/11/20 150 lb 4.8 oz (68.2 kg)         Physical Exam  Constitutional:       General: She is not in acute distress. Appearance: She is well-developed. She is obese. She is not diaphoretic. HENT:      Head: Normocephalic and atraumatic. Nose: Nose normal.   Eyes:      Pupils: Pupils are equal, round, and reactive to light. Neck:      Thyroid: No thyromegaly. Vascular: No JVD. Trachea: No tracheal deviation. Cardiovascular:      Rate and Rhythm: Normal rate and regular rhythm. Heart sounds: No murmur. No friction rub. No gallop. Pulmonary:      Effort: No respiratory distress. Breath sounds: No wheezing or rales. Comments: diminished Breath sound bilaterally. Chest:      Chest wall: No tenderness. Abdominal:      General: There is no distension. Tenderness: There is no abdominal tenderness. There is no rebound. Musculoskeletal: Normal range of motion. Lymphadenopathy:      Cervical: No cervical adenopathy. Skin:     General: Skin is warm and dry. Neurological:      Mental Status: She is alert and oriented to person, place, and time.       Coordination: Coordination normal.         Current Outpatient Medications   Medication Sig Dispense Refill    alendronate (FOSAMAX) 70 MG tablet Take 1 tablet by mouth every 7 days 12 tablet 4    amLODIPine (NORVASC) 5 MG tablet TAKE 1 TABLET BY MOUTH DAILY. 90 tablet 4    atorvastatin (LIPITOR) 20 MG tablet Take 1 tablet by mouth daily 90 tablet 4    escitalopram (LEXAPRO) 10 MG tablet Take 1 tablet by mouth daily 90 tablet 1    umeclidinium-vilanterol (ANORO ELLIPTA) 62.5-25 MCG/INH AEPB inhaler Inhale 1 puff into the lungs daily 90 puff 1    albuterol sulfate  (90 Base) MCG/ACT inhaler Inhale 2 puffs into the lungs every morning (before breakfast) 1 Inhaler 3    fluticasone (FLONASE) 50 MCG/ACT nasal spray 2 sprays by Nasal route daily 1 Bottle 5    calcium carbonate 648 MG TABS Take 1 tablet by mouth 2 times daily 180 tablet 4    OXYGEN Start oxygen therapy at 2 lit with activity and sleep , give POC for ambulation     Dx COPD 1 Units 0    aspirin 81 MG EC tablet Take 1 tablet by mouth daily 30 tablet 3    Multiple Vitamins-Minerals (PRESERVISION AREDS 2) CAPS Take 1 capsule by mouth daily 90 capsule 3    coenzyme Q10 100 MG CAPS capsule Take 100 mg by mouth daily      Ascorbic Acid (VITAMIN C) 500 MG tablet Take 500 mg by mouth daily.  vitamin D (CHOLECALCIFEROL) 1000 UNIT TABS tablet Take 1,000 Int'l Units by mouth daily.  donepezil (ARICEPT) 5 MG tablet Take 1 tablet by mouth nightly 90 tablet 4     No current facility-administered medications for this visit. No results found for this or any previous visit.]  Results for orders placed during the hospital encounter of 11/12/19   XR CHEST PORTABLE    Narrative XR CHEST PORTABLE    Exam Date/Time:  11/12/2019 3:30 PM  Clinical History:   CVA   Comparison:  7/20/2014      RESULT:     Lines, tubes, and devices:  None. Lungs and pleura:  No consolidation. No pleural effusion. No pneumothorax. Normal pulmonary vascular pattern.     Cardiomediastinal

## 2021-04-16 NOTE — TELEPHONE ENCOUNTER
Pt son calling on behalf of [facility] Casper to ask if patient needs to keep her 4/22 follow up appointment? Pt states she is doing and feeling well. Please advise and thank you.     Staff: Please contact son Dora Segal [phone]

## 2021-04-22 NOTE — PROGRESS NOTES
Medicare Annual Wellness Visit  Name: Haywood Primrose Date: 2021   MRN: [id number] Sex: Female   Age: 80 y.o. Ethnicity: Non-/Non    : 1940 Race: Camryn Perez is here for Medicare AWV    Screenings for behavioral, psychosocial and functional/safety risks, and cognitive dysfunction are all negative except as indicated below. These results, as well as other patient data from the [address] form, are documented in Flowsheets linked to this Encounter. No Known Allergies    Prior to Visit Medications    Medication Sig Taking? Authorizing Provider   alendronate (FOSAMAX) 70 MG tablet Take 1 tablet by mouth every 7 days Yes Marcos Hayes MD   amLODIPine (NORVASC) 5 MG tablet TAKE 1 TABLET BY MOUTH DAILY.  Yes Marcos Hayes MD   atorvastatin (LIPITOR) 20 MG tablet Take 1 tablet by mouth daily Yes Marcos Hayes MD   escitalopram (LEXAPRO) 10 MG tablet Take 1 tablet by mouth daily Yes Marcos Hayes MD   umeclidinium-vilanterol (ANORO ELLIPTA) 62.5-25 MCG/INH AEPB inhaler Inhale 1 puff into the lungs daily Yes Felix Lang MD   albuterol sulfate  (90 Base) MCG/ACT inhaler Inhale 2 puffs into the lungs every morning (before breakfast) Yes Felix Lang MD   fluticasone (FLONASE) 50 MCG/ACT nasal spray 2 sprays by Nasal route daily Yes Felix Lang MD   calcium carbonate 648 MG TABS Take 1 tablet by mouth 2 times daily Yes Marcos Hayes MD   OXYGEN Start oxygen therapy at 2 lit with activity and sleep , give POC for ambulation     Dx COPD Yes Felix Lang MD   aspirin 81 MG EC tablet Take 1 tablet by mouth daily Yes Marybeth Fay, DO   Multiple Vitamins-Minerals (PRESERVISION AREDS 2) CAPS Take 1 capsule by mouth daily Yes Marcos Hayes MD   coenzyme Q10 100 MG CAPS capsule Take 100 mg by mouth daily Yes Historical Provider, MD   Ascorbic Acid (VITAMIN C) 500 MG tablet Take 500 mg by mouth daily. Yes Historical Provider, MD   vitamin D (CHOLECALCIFEROL) 1000 UNIT TABS tablet Take 1,000 Int'l Units by mouth daily. Yes Historical Provider, MD   donepezil (ARICEPT) 5 MG tablet Take 1 tablet by mouth nightly  Jake Ames MD       Past Medical History:   Diagnosis Date    COPD [facility]chronic obstructive [facility] disease) [facility] [address]     Dyspnea on exertion 7/31/2019    Hypertension     Impaired cognition     Irritable bowel syndrome without diarrhea 12/22/2020    Mild cognitive impairment with memory loss 9/25/2020    Osteoarthritis     Tobacco abuse 5/6/2019    Tobacco use disorder 5/6/2019    Vascular dementia ([facility] [address] 6/22/2020       Past Surgical History:   Procedure Laterality Date    EYE SURGERY  2010    PER PROBLEM SHEET    HYSTERECTOMY  1975    PER PROBLEM SHEET    LITHOTRIPSY  2004    PER PROBLEM SHEET    VEIN SURGERY  1988    PER PROBLEM SHEET       Family History   Problem Relation Age of Onset    Diabetes Other         GRANDMOTHER       CareTeam (Including outside providers/suppliers regularly involved in providing care):   Patient Care Team:  Drucilla Harada, MD as PCP - General (Family Medicine)  Drucilla Harada, MD as PCP - [facility] Empaneled Provider    Wt Readings from Last 3 Encounters:   04/22/21 171 lb (77.6 kg)   04/22/21 171 lb (77.6 kg)   03/25/21 170 lb (77.1 kg)     Vitals:    04/22/21 0846   BP: 110/64   Site: Right Upper Arm   Position: Sitting   Cuff Size: Medium Adult   Pulse: 70   Resp: 24   Temp: 97.5 °F (36.4 °C)   TempSrc: Tympanic   SpO2: 91%   Weight: 171 lb (77.6 kg)   Height: 5' (1.524 m)     Body mass index is 33.4 kg/m². Based upon direct observation of the patient, evaluation of cognition reveals global memory impairment noted. Patient's complete Health Risk Assessment and screening values have been reviewed and are found in Flowsheets.  The following problems were reviewed today and where indicated follow up appointments were made and/or referrals ordered. Positive Risk Factor Screenings with Interventions:           Health Habits/Nutrition:  Health Habits/Nutrition  Do you exercise for at least 20 minutes 2-3 times per week?: (!) No  Have you lost any weight without trying in the past 3 months?: No  Do you eat only one meal per day?: No  Have you seen the dentist within the past year?: Yes  Body mass index: (!) 33.39  Health Habits/Nutrition Interventions:  · Inadequate physical activity:  educational materials provided to promote increased physical activity, patient is not ready to increase his/her physical activity level at this time  · Nutritional issues:  educational materials for healthy, well-balanced diet provided    Hearing/Vision:  No exam data present  Hearing/Vision  Do you or your family notice any trouble with your hearing that hasn't been managed with hearing aids?: (!) Yes  Do you have difficulty driving, watching TV, or doing any of your daily activities because of your eyesight?: (!) Yes  Have you had an eye exam within the past year?: Yes  Hearing/Vision Interventions:  · Hearing concerns:  patietn will consider hearing eval  · Vision concerns:  UTD on eye exams     ADL:  ADLs  In the past 7 days, did you need help from others to perform any of the following everyday activities? Eating, dressing, grooming, bathing, toileting, or walking/balance?: (!) Walking/Balance  In the past 7 days, did you need help from others to take care of any of the following?  Laundry, housekeeping, banking/finances, shopping, telephone use, food preparation, transportation, or taking medications?: None  ADL Interventions:  · Patient declines any further evaluation/treatment for this issue    Personalized Preventive Plan   Current Health Maintenance Status  Immunization History   Administered Date(s) Administered    Influenza, High Dose (Fluzone 65 yrs and older) 11/07/2018    Influenza, Quadv, IM, PF (6 mo and older Fluzone, Flulaval, Fluarix, and 3 yrs and older Afluria) 11/14/2019    Influenza, Quadv, adjuvanted, 65 yrs +, IM, PF (Fluad) 09/28/2020    Pneumococcal Conjugate 13-valent ([id number]) 09/28/2015    Pneumococcal Polysaccharide ([id number]) 03/13/2013        Health Maintenance   Topic Date Due    COVID-19 Vaccine (1) Never done    Shingles Vaccine (1 of 2) Never done   [employer] Visit (AWV)  Never done    Potassium monitoring  11/13/2020    Creatinine monitoring  11/13/2020    Lipid screen  02/11/2021    DTaP/Tdap/Td vaccine (1 - Tdap) 09/21/2021 (Originally 3/29/1959)    DEXA (modify frequency per FRAX score)  Completed    Flu vaccine  Completed    Pneumococcal 65+ years Vaccine  Completed    Hepatitis A vaccine  Aged Out    Hepatitis B vaccine  Aged Out    Hib vaccine  Aged Out    Meningococcal (ACWY) vaccine  Aged Out    Hepatitis C screen  Discontinued     Recommendations for [employer] Due: see orders and patient instructions/AVS.  . Recommended screening schedule for the next 5-10 years is provided to the patient in written form: see Patient Sebastian Borrego was seen today for medicare awv.     Diagnoses and all orders for this visit:    Routine general medical examination at a health care facility

## 2021-04-22 NOTE — PROGRESS NOTES
Subjective  Dwain Freeze, 80 y.o. female presents today with:  Chief Complaint   Patient presents with    Hypertension     HTN, CVA, Mood Disorder Follow-Up    COPD     Follow-Up           HPI    COPD. On Anoro and prn albuterol - less than 3 times a week/really never. No significant cough, mild baseline shortness of breath, occasional wheezing, mild, occasional chest tightness. Has home O2 which she wears at night and sometimes wears during the day. She forgot to take her Anoro for 3 weeks because it was not with her other medications. Left hip arthritis - NEeds JAME but there is apprehension regarding anesthesia adverse reaction. Patient is being treated for depression/anxiety and has been essentially compliant with meds which do not cause side effects. Mood is improved. No suicidal ideation. Sleep is normal.    Patient is here for f/u HTN. Is compliant with meds and has no side effects from them. Avoids added salt. Tries to eat healthy. Exercises occasionally. Has no chest pain,  palpitations or edema. CVA. No new deficits. No facial droop. No speech impairment. No unilateral weakness. Dementia - has to live with son. Failed mini-cog today. Has omitted medications. Does not recall name of COVID. Does recall names of family.       No other questions and or concerns for today's visit        Past Medical History:   Diagnosis Date    COPD (chronic obstructive pulmonary disease) ([address]     Dyspnea on exertion 7/31/2019    Hypertension     Impaired cognition     Irritable bowel syndrome without diarrhea 12/22/2020    Mild cognitive impairment with memory loss 9/25/2020    Osteoarthritis     Tobacco abuse 5/6/2019    Tobacco use disorder 5/6/2019    Vascular dementia ([address] 6/22/2020     Past Surgical History:   Procedure Laterality Date    EYE SURGERY  2010    PER PROBLEM SHEET    HYSTERECTOMY  1975    PER PROBLEM SHEET    LITHOTRIPSY  2004    [facility] PER PROBLEM SHEET     Social History     Socioeconomic History    Marital status:      Spouse name: Not on file    Number of children: Not on file    Years of education: Not on file    Highest education level: Not on file   Occupational History    Not on file   Social Needs    Financial resource strain: Not on file    Food insecurity     Worry: Never true     Inability: Not on file    Transportation needs     Medical: No     Non-medical: No   Tobacco Use    Smoking status: Former Smoker     Packs/day: 0.75     Years: 45.00     Pack years: 33.75     Types: Cigarettes     Start date:      Quit date: 2019     Years since quittin.2    Smokeless tobacco: Never Used   Substance and Sexual Activity    Alcohol use: No     Comment: social    Drug use: No    Sexual activity: Not on file   Lifestyle    Physical activity     Days per week: 0 days     Minutes per session: 0 min    Stress:  Only a little   Relationships    Social connections     Talks on phone: More than three times a week     Gets together: More than three times a week     Attends [employer] service: More than 4 times per year     Active member of club or organization: No     Attends meetings of clubs or organizations: Never     Relationship status:     Intimate partner violence     Fear of current or ex partner: No     Emotionally abused: No     Physically abused: No     Forced sexual activity: No   Other Topics Concern    Not on file   Social History Narrative         Lives With: Alone son is helpful and is her POA for health care    Type of Home: House    Home Layout: One level    Home Access: Stairs to enter with rails    Entrance Stairs - Number of Steps: 2    Bathroom Shower/Tub: Tub/Shower unit, Walk-in shower    Bathroom Equipment: Hand-held shower    ADL Assistance: Independent    Homemaking Assistance: Independent    Ambulation Assistance: Independent(No AD)    Transfer Assistance: Independent    Active : Yes    Additional Comments: Pt. reports she has fallen at home but not recently and not regularly. Pt. having word finding difficulty, but sons are present and clarify pt's answers     Family History   Problem Relation Age of Onset    Diabetes Other         GRANDMOTHER     No Known Allergies  Current Outpatient Medications   Medication Sig Dispense Refill    alendronate (FOSAMAX) 70 MG tablet Take 1 tablet by mouth every 7 days 12 tablet 4    amLODIPine (NORVASC) 5 MG tablet TAKE 1 TABLET BY MOUTH DAILY. 90 tablet 4    atorvastatin (LIPITOR) 20 MG tablet Take 1 tablet by mouth daily 90 tablet 4    escitalopram (LEXAPRO) 10 MG tablet Take 1 tablet by mouth daily 90 tablet 1    umeclidinium-vilanterol (ANORO ELLIPTA) 62.5-25 MCG/INH AEPB inhaler Inhale 1 puff into the lungs daily 90 puff 1    albuterol sulfate  (90 Base) MCG/ACT inhaler Inhale 2 puffs into the lungs every morning (before breakfast) 1 Inhaler 3    fluticasone (FLONASE) 50 MCG/ACT nasal spray 2 sprays by Nasal route daily 1 Bottle 5    calcium carbonate 648 MG TABS Take 1 tablet by mouth 2 times daily 180 tablet 4    OXYGEN Start oxygen therapy at 2 lit with activity and sleep , give POC for ambulation     Dx COPD 1 Units 0    aspirin 81 MG EC tablet Take 1 tablet by mouth daily 30 tablet 3    Multiple Vitamins-Minerals (PRESERVISION AREDS 2) CAPS Take 1 capsule by mouth daily 90 capsule 3    coenzyme Q10 100 MG CAPS capsule Take 100 mg by mouth daily      Ascorbic Acid (VITAMIN C) 500 MG tablet Take 500 mg by mouth daily.  vitamin D (CHOLECALCIFEROL) 1000 UNIT TABS tablet Take 1,000 Int'l Units by mouth daily.  donepezil (ARICEPT) 5 MG tablet Take 1 tablet by mouth nightly 90 tablet 4     No current facility-administered medications for this visit. PMH, Surgical Hx, Family Hx, and Social Hxreviewed and updated. Health Maintenance reviewed.     Objective    Vitals:    04/22/21 0837   BP: 110/68   Site: (primary) hypertension        stable and well-controlled on current meds    Basic Metabolic Panel [72266 Custom]   - Future Order         Chronic obstructive pulmonary disease, unspecified COPD type ([address]        stable and fairly well-controlled on current meds and O2; followed by pulmonology         Anxiety        stable and well-controlled on current meds         Arthropathy of both hips        Urged family discussion with surgeon and anesthesiologist                 Reviewed with the patient: all disease processes, current clinical status, medications, activities and diet.      Side effects, adverse effects of the medication prescribed today, as well as treatment plan/ rationale and result expectations have been discussed with the patient who expresses understanding and desires to proceed.     Close follow up to evaluate treatment results and for coordination of care. I have reviewed the patient's medical history in detail and updated the computerized patient record. More than 50% of the appointment was spent in face-to-face counseling, education and care coordination. Orders Placed This Encounter   Procedures    Lipid, Fasting     Standing Status:   Future     Standing Expiration Date:   4/22/2022    Basic Metabolic Panel     Standing Status:   Future     Standing Expiration Date:   4/22/2022     No orders of the defined types were placed in this encounter. There are no discontinued medications. Return in about 4 months (around 8/22/2021) for HTN, Mood Disorder, demenia, HLD, CVA - VV. Controlled Substance Monitoring:    Acute and Chronic Pain Monitoring:   No flowsheet data found.         Santi Staley MD

## 2021-04-22 NOTE — PATIENT INSTRUCTIONS
diet is one that limits sodium , certain types of fat , and cholesterol . This type of diet is recommended for:   People with any form of cardiovascular disease (eg, coronary heart disease , peripheral vascular disease , previous heart attack , previous stroke )   People with risk factors for cardiovascular disease, such as high blood pressure , high cholesterol , or diabetes   Anyone who wants to lower their risk of developing cardiovascular disease   Sodium    Sodium is a mineral found in many foods. In general, most people consume much more sodium than they need. Diets high in sodium can increase blood pressure and lead to edema (water retention). On a heart-healthy diet, you should consume no more than 2,300 mg (milligrams) of sodium per dayabout the amount in one teaspoon of table salt. The foods highest in sodium include table salt (about 50% sodium), processed foods, convenience foods, and preserved foods. Cholesterol    Cholesterol is a fat-like, waxy substance in your blood. Our bodies make some cholesterol. It is also found in animal products, with the highest amounts in fatty meat, egg yolks, whole milk, cheese, shellfish, and organ meats. On a heart-healthy diet, you should limit your cholesterol intake to less than 200 mg per day. It is normal and important to have some cholesterol in your bloodstream. But too much cholesterol can cause plaque to build up within your arteries, which can eventually lead to a heart attack or stroke. The two types of cholesterol that are most commonly referred to are:   Low-density lipoprotein (LDL) cholesterol  Also known as bad cholesterol, this is the cholesterol that tends to build up along your arteries. Bad cholesterol levels are increased by eating fats that are saturated or hydrogenated. Optimal level of this cholesterol is less than 100. Over 130 starts to get risky for heart disease.    High-density lipoprotein (HDL) cholesterol  Also known as good cholesterol, this type of cholesterol actually carries cholesterol away from your arteries and may, therefore, help lower your risk of having a heart attack. You want this level to be high (ideally greater than 60). It is a risk to have a level less than 40. You can raise this good cholesterol by eating olive oil, canola oil, avocados, or nuts. Exercise raises this level, too. Fat    Fat is calorie dense and packs a lot of calories into a small amount of food. Even though fats should be limited due to their high calorie content, not all fats are bad. In fact, some fats are quite healthful. Fat can be broken down into four main types. The good-for-you fats are:   Monounsaturated fat  found in oils such as olive and canola, avocados, and nuts and natural nut butters; can decrease cholesterol levels, while keeping levels of HDL cholesterol high   Polyunsaturated fat  found in oils such as safflower, sunflower, soybean, corn, and sesame; can decrease total cholesterol and LDL cholesterol   Omega-3 fatty acids  particularly those found in fatty fish (such as salmon, trout, tuna, mackerel, herring, and sardines); can decrease risk of arrhythmias, decrease triglyceride levels, and slightly lower blood pressure   The fats that you want to limit are:   Saturated fat  found in animal products, many fast foods, and a few vegetables; increases total blood cholesterol, including LDL levels   Animal fats that are saturated include: butter, lard, whole-milk dairy products, meat fat, and poultry skin   Vegetable fats that are saturated include: hydrogenated shortening, palm oil, coconut oil, cocoa butter   Hydrogenated or trans fat  found in margarine and vegetable shortening, most shelf stable snack foods, and fried foods; increases LDL and decreases HDL     It is generally recommended that you limit your total fat for the day to less than 30% of your total calories.  If you follow an 1800-calorie heart healthy diet, for example, this would mean 60 grams of fat or less per day. Saturated fat and trans fat in your diet raises your blood cholesterol the most, much more than dietary cholesterol does. For this reason, on a heart-healthy diet, less than 7% of your calories should come from saturated fat and ideally 0% from trans fat. On an 1800-calorie diet, this translates into less than 14 grams of saturated fat per day, leaving 46 grams of fat to come from mono- and polyunsaturated fats.    Food Choices on a Heart Healthy Diet   Food Category   Foods Recommended   Foods to Avoid   Grains   Breads and rolls without salted tops Most dry and cooked cereals Unsalted crackers and breadsticks Low-sodium or homemade breadcrumbs or stuffing All rice and pastas   Breads, rolls, and crackers with salted tops High-fat baked goods (eg, muffins, donuts, pastries) Quick breads, self-rising flour, and biscuit mixes Regular bread crumbs Instant hot cereals Commercially prepared rice, pasta, or stuffing mixes   Vegetables   Most fresh, frozen, and low-sodium canned vegetables Low-sodium and salt-free vegetable juices Canned vegetables if unsalted or rinsed   Regular canned vegetables and juices, including sauerkraut and pickled vegetables Frozen vegetables with sauces Commercially prepared potato and vegetable mixes   Fruits   Most fresh, frozen, and canned fruits All fruit juices   Fruits processed with salt or sodium   Milk   Nonfat or low-fat (1%) milk Nonfat or low-fat yogurt Cottage cheese, low-fat ricotta, cheeses labeled as low-fat and low-sodium   Whole milk Reduced-fat (2%) milk Malted and chocolate milk Full fat yogurt Most cheeses (unless low-fat and low salt) Buttermilk (no more than 1 cup per week)   Meats and Beans   Lean cuts of fresh or frozen beef, veal, lamb, or pork (look for the word loin) Fresh or frozen poultry without the skin Fresh or frozen fish and some shellfish Egg whites and egg substitutes (Limit whole eggs to three per stabilize blood sugar levels. How Much Fiber Should I Eat? A high-fiber diet should contain  20-35 grams  of fiber a day. This is actually the amount recommended for the general adult population; however, most Americans eat only 15 grams of fiber per day. Digestion of Fiber   Eating a higher fiber diet than usual can take some getting used to by your body's digestive system. To avoid the side effects of sudden increases in dietary fiber (eg, gas, cramping, bloating, and diarrhea), increase fiber gradually and be sure to drink plenty of fluids every day. Tips for Increasing Fiber Intake   Whenever possible, choose whole grains over refined grains (eg, brown rice instead of white rice, whole-wheat bread instead of white bread). Include a variety of grains in your diet, such as wheat, rye, barley, oats, quinoa, and bulgur. Eat more vegetarian-based meals. Here are some ideas: black bean burgers, eggplant lasagna, and veggie tofu stir-smith. Choose high-fiber snacks, such as fruits, popcorn, whole-grain crackers, and nuts. Make whole-grain cereal or whole-grain toast part of your daily breakfast regime. When eating out, whether ordering a sandwich or dinner, ask for extra vegetables. When baking, replace part of the white flour with whole-wheat flour. Whole-wheat flour is particularly easy to incorporate into a recipe. High-Fiber Diet Eating Guide   Food Category   Foods Recommended   Notes   Grains   Whole-grain breads, muffins, bagels, or chelsie bread Rye bread Whole-wheat crackers or crisp breads Whole-grain or bran cereals Oatmeal, oat bran, or grits Wheat germ Whole-wheat pasta and brown rice   Read the ingredients list on food labels. Look for products that list \"whole\" as the first ingredient (eg, whole-wheat, whole oats). Choose cereals with at least 2 grams of fiber per serving.    Vegetables   All vegetables, especially asparagus, bean sprouts, broccoli, [place] sprouts, cabbage, memory. Challenge Your Brain   Regularly challenging your mind may help keeps it in top shape. Good mental exercises include:   Crossword puzzlesUse a dictionary if you need it; you will learn more that way. Brainteasers Try some! Crafts, such as wood working and sewing   Hobbies, such as gardening and building model airplanes   SocializingVisit old friends or join groups to meet new ones. Reading   Learning a new language   Taking a class, whether it be art history or brisa chi   TravelingExperience the food, history, and culture of your destination   Learning to use a computer   Going to museums, the theater, or thought-provoking movies   Changing things in your daily life, such as reversing your pattern in the grocery store or brushing your teeth using your nondominant hand   Use Memory Aids   There is no need to remember every detail on your own. These memory aids can help:   Calendars and day planners   Electronic organizers to store all sorts of helpful informationThese devices can \"beep\" to remind you of appointments. A book of days to record birthdays, anniversaries, and other occasions that occur on the same date every year   Detailed \"to-do\" lists and strategically placed sticky notes   Quick \"study\" sessionsBefore a gathering, review who will be there so their names will be fresh in your mind. Establish routinesFor example, keep your keys, wallet, and umbrella in the same place all the time or take medicine with your 8:00 AM glass of juice   Live a Healthy Life   Many actions that will keep your body strong will do the same for your mind. For example:   Talk to Your Doctor About Herbs and Supplements    Malnutrition and vitamin deficiencies can impair your mental function. For example, vitamin B12 deficiency can cause a range of symptoms, including confusion. But, what if your nutritional needs are being met? Can herbs and supplements still offer a benefit?  Researchers have investigated a range of natural remedies, such as ginkgo , ginseng , and the supplement phosphatidylserine (PS). So far, though, the evidence is inconsistent as to whether these products can improve memory or thinking. If you are interested in taking herbs and supplements, talk to your doctor first because they may interact with other medicines that you are taking. Exercise Regularly    Among the many benefits of regular exercise are increased blood flow to the brain and decreased risk of certain diseases that can interfere with memory function. One study found that even moderate exercise has a beneficial effect. Examples of \"moderate\" exercise include:   Playing 18 holes of golf once a week, without a cart   Playing tennis twice a week   Walking one mile per day   Manage Stress    It can be tough to remember what is important when your mind is cluttered. Make time for relaxation. Choose activities that calm you down, and make it routine. Manage Chronic Conditions    Side effects of high blood pressure , diabetes, and heart disease can interfere with mental function. Many of the lifestyle steps discussed here can help manage these conditions. Strive to eat a healthy diet, exercise regularly, get stress under control, and follow your doctor's advice for your condition. Minimize Medications    Talk to your doctor about the medicines that you take. Some may be unnecessary. Also, healthy lifestyle habits may lower the need for certain drugs. Last Reviewed: April 2010 Pal Torres MD   Updated: 4/13/2010   ·     [address]       As we get older, changes in balance, gait, strength, vision, hearing, and cognition make even the most youthful senior more prone to accidents. Falls are one of the leading health risks for older people.  This increased risk of falling is related to:   Aging process (eg, decreased muscle strength, slowed reflexes)   Higher incidence of chronic health problems (eg, arthritis, diabetes) that may limit mobility, agility or sensory awareness   Side effects of medicine (eg, dizziness, blurred vision)especially medicines like prescription pain medicines and drugs used to treat mental health conditions   Depending on the brittleness of your bones, the consequences of a fall can be serious and long lasting. Home Life   Research by the Association of [facility]) shows that some home accidents among older adults can be prevented by making simple lifestyle changes and basic modifications and repairs to the home environment. Here are some lifestyle changes that experts recommend:   Have your hearing and vision checked regularly. Be sure to wear prescription glasses that are right for you. Speak to your doctor or pharmacist about the possible side effects of your medicines. A number of medicines can cause dizziness. If you have problems with sleep, talk to your doctor. Limit your intake of alcohol. If necessary, use a cane or walker to help maintain your balance. Wear supportive, rubber-soled shoes, even at home. If you live in a region that gets wintry weather, you may want to put special cleats on your shoes to prevent you from slipping on the snow and ice. Exercise regularly to help maintain muscle tone, agility, and balance. Always hold the banister when going up or down stairs. Also, use  bars when getting in or out of the bath or shower, or using the toilet. To avoid dizziness, get up slowly from a lying down position. Sit up first, dangling your legs for a minute or two before rising to a standing position. Overall Home Safety Check   According to the Consumer Product Safety Commision's \"Older Consumer Home Safety Checklist,\" it is important to check for potential hazards in each room. And remember, proper lighting is an essential factor in home safety. If you cannot see clearly, you are more likely to fall.    Important questions to ask yourself include:   Are lamp, electric, extension, and telephone cords placed out of the flow of traffic and maintained in good condition? Have frayed cords been replaced? Are all small rugs and runners slip resistant? If not, you can secure them to the floor with a special double-sided carpet tape. Are smoke detectors properly locatedone on every floor of your home and one outside of every sleeping area? Are they in good working order? Are batteries replaced at least once a year? Do you have a well-maintained carbon monoxide detector outside every sleeping are in your home? Does your furniture layout leave plenty of space to maneuver between and around chairs, tables, beds, and sofas? Are hallways, stairs and passages between rooms well lit? Can you reach a lamp without getting out of bed? Are floor surfaces well maintained? Shag rugs, high-pile carpeting, tile floors, and polished wood floors can be particularly slippery. Stairs should always have handrails and be carpeted or fitted with a non-skid tread. Is your telephone easily reachable. Is the cord safely tucked away? Room by Room   According to the Association of Aging, bathrooms and ana are the two most potentially hazardous rooms in your home. In the Kitchen    Be sure your stove is in proper working order and always make sure burners and the oven are off before you go out or go to sleep. Keep pots on the back burners, turn handles away from the front of the stove, and keep stove clean and free of grease build-up. Kitchen ventilation systems and range exhausts should be working properly. Keep flammable objects such as towels and pot holders away from the cooking area except when in use. Make sure kitchen curtains are tied back. Move cords and appliances away from the sink and hot surfaces. If extension cords are needed, install wiring guides so they do not hang over the sink, range, or working areas.     Look for coffee pots, kettles and toaster ovens with automatic shut-offs. Keep a mop handy in the kitchen so you can wipe up spills instantly. You should also have a small fire extinguisher. Arrange your kitchen with frequently used items on lower shelves to avoid the need to stand on a stepstool to reach them. Make sure countertops are well-lit to avoid injuries while cutting and preparing food. In the Bathroom    Use a non-slip mat or decals in the tub and shower, since wet, soapy tile or porcelain surfaces are extremely slippery. Make sure bathroom rugs are non-skid or tape them firmly to the floor. Bathtubs should have at least one, preferably two, grab bars, firmly attached to structural supports in the wall. (Do not use built-in soap holders or glass shower doors as grab bars.)    Tub seats fitted with non-slip material on the legs allow you to wash sitting down. For people with limited mobility, bathtub transfer benches allow you to slide safely into the tub. Raised toilet seats and toilet safety rails are helpful for those with knee or hip problems. In the [facility]    Make sure you use a nightlight and that the area around your bed is clear of potential obstacles. Be careful with electric blankets and never go to sleep with a heating pad, which can cause serious burns even if on a low setting. Use fire-resistant mattress covers and pillows, and NEVER smoke in bed. Keep a phone next to the bed that is programmed to dial 911 at the push of a button. If you have a chronic condition, you may want to sign on with an automatic call-in service. Typically the system includes a small pendant that connects directly to an emergency medical voice-response system. You should also make arrangements to stay in contact with someonefriend, neighbor, family memberon a regular schedule.    Fire Prevention   According to the [employer]. (Smoke Alarms for Every) [address] [place] [address], senior citizens are one of the two highest risk groups for death and serious injuries due to residential fires. When cooking, wear short-sleeved items, never a bulky long-sleeved robe. The [facility]'s Safety Checklist for Older Consumers emphasizes the importance of checking basements, garages, workshops and storage areas for fire hazards, such as volatile liquids, piles of old rags or clothing and overloaded circuits. Never smoke in bed or when lying down on a couch or recliner chair. Small portable electric or kerosene heaters are responsible for many home fires and should be used cautiously if at all. If you do use one, be sure to keep them away from flammable materials. In case of fire, make sure you have a pre-established emergency exit plan. Have a professional check your fireplace and other fuel-burning appliances yearly. [employer]   Baby boomers entering the eden years will continue to see the development of new products to help older adults live safely and independently in spite of age-related changes. Making Life More Livable  , by Jorge Ramirez, lists over 1,000 products for \"living well in the mature years,\" such as bathing and mobility aids, household security devices, ergonomically designed knives and peelers, and faucet valves and knobs for temperature control. Medical supply stores and organizations are good sources of information about products that improve your quality of life and insure your safety. Last Reviewed: November 2009 Merari Olivera MD   Updated: 3/7/2011     ·   Personalized Preventive Plan for Carlee Barillas - 4/22/2021  Medicare offers a range of preventive health benefits. Some of the tests and screenings are paid in full while other may be subject to a deductible, co-insurance, and/or copay. Some of these benefits include a comprehensive review of your medical history including lifestyle, illnesses that may run in your family, and various assessments and screenings as appropriate.     After reviewing your medical record and screening and assessments performed today your provider may have ordered immunizations, labs, imaging, and/or referrals for you. A list of these orders (if applicable) as well as your Preventive Care list are included within your After Visit Summary for your review. Other Preventive Recommendations:    A preventive eye exam performed by an eye specialist is recommended every 1-2 years to screen for glaucoma; cataracts, macular degeneration, and other eye disorders. A preventive dental visit is recommended every 6 months. Try to get at least 150 minutes of exercise per week or 10,000 steps per day on a pedometer . Order or download the FREE \"Exercise & Physical Activity: Your Everyday Guide\" from [employer]. Call 9-199.189.4892 or search [employer] online. You need 5478-4702 mg of calcium and 8682-1243 IU of vitamin D per day. It is possible to meet your calcium requirement with diet alone, but a vitamin D supplement is usually necessary to meet this goal.  When exposed to the sun, use a sunscreen that protects against both UVA and UVB radiation with an SPF of 30 or greater. Reapply every 2 to 3 hours or after sweating, drying off with a towel, or swimming. Always wear a seat belt when traveling in a car. Always wear a helmet when riding a bicycle or motorcycle. Patient Education        Learning About Being Active as an Older Adult  Why is being active important as you get older? Being active is one of the best things you can do for your health. And it's never too late to start. Being activeor getting active, if you aren't alreadyhas definite benefits. It can:  Give you more energy,  Keep your mind sharp. Improve balance to reduce your risk of falls. Help you manage chronic illness with fewer medicines. No matter how old you are, how fit you are, or what health problems you have, there is a form of activity that will work for you.  And the more Small steps build into big accomplishments. Staying motivated. If you feel like skipping your activity, remember your goal. Maybe you want to move better and stay independent. Every activity gets you one step closer. Not feeling your best.   Start with 5 minutes of an activity you enjoy. Prove to yourself you can do it. As you get comfortable, increase your time. You may not be where you want to be. But you're in the process of getting there. Everyone starts somewhere. How can you find safe ways to stay active? Talk with your doctor about any physical challenges you're facing. Make a plan with your doctor if you have a health problem or aren't sure how to get started with activity. If you tend to feel dizzy after you take medicine, avoid activity at that time. Try being active before you take your medicine. This will reduce your risk of falls. If you plan to be active at home, make sure to clear your space before you get started. Remove things like TV cords, coffee tables, and throw rugs. It's safest to have plenty of space to move freely. The key to getting more active is to take it slow and steady. Try to improve only a little bit at a time. Pick just one area to improve on at first. And if an activity hurts, stop and talk to your doctor. Where can you learn more? Go to https://brooks.[employer]. org and sign in to your [employer] account. Enter P600 in the Search Health Information box to learn more about \"Learning About Being Active as an Older Adult. \"     If you do not have an account, please click on the \"Sign Up Now\" link. Current as of: September 10, 2020               Content Version: 12.8  © 9533-7558 Healthwise, [employer]. Care instructions adapted under license by [facility] ([facility]). If you have questions about a medical condition or this instruction, always ask your healthcare professional. [place] [address] any warranty or liability for your use of this information. Patient Education         Seated Exercises for Older Adults (03:21)  Your health professional recommends that you watch this short online health video. Learn simple exercises that you can do while sitting down. How to watch the video    Scan the QR code   OR Visit the website    [email or web address]   Current as of: November 16, 2020               Content Version: 12.8  © 2006-2021 [employer]. Care instructions adapted under license by [employer] ([facility]). If you have questions about a medical condition or this instruction, always ask your healthcare professional. [address] any warranty or liability for your use of this information. Patient Education         Older Adults: Making Physical Activity a Routine (03:04)  Your health professional recommends that you watch this short online health video. Picture yourself making physical activity a routine, and practice taking small steps to get started. How to watch the video    Scan the QR code   OR Visit the website    [email or web address]   Current as of: September 10, 2020               Content Version: 12.8  © 2006-2021 [employer]. Care instructions adapted under license by [employer] ([facility]). If you have questions about a medical condition or this instruction, always ask your healthcare professional. [address] any warranty or liability for your use of this information. Patient Education         Older Adults: Overcoming Barriers to Fitness (02:07)  Your health professional recommends that you watch this short online health video. Learn how to overcome obstacles to getting fit. How to watch the video    Scan the QR code   OR Visit the website    [email or web address]   Current as of: September 10, 2020               Content Version: 12.8  © 2006-2021 [employer]. Care instructions adapted under license by [facility]).  If you have questions about a medical condition or this instruction, always ask your healthcare professional. [place] [address] any warranty or liability for your use of this information.

## 2021-05-17 NOTE — PROGRESS NOTES
dizziness. Negative for weakness, light-headedness, numbness and headaches. Hematological: Does not bruise/bleed easily. Psychiatric/Behavioral: Negative. PAST MEDICAL HISTORY         Diagnosis Date    COPD (chronic obstructive pulmonary disease) ([facility] [address]     Dyspnea on exertion 7/31/2019    Hypertension     Impaired cognition     Irritable bowel syndrome without diarrhea 12/22/2020    Mild cognitive impairment with memory loss 9/25/2020    Osteoarthritis     Tobacco abuse 5/6/2019    Tobacco use disorder 5/6/2019    Vascular dementia ([facility] [address] 6/22/2020     SURGICAL HISTORY     Patient  has a past surgical history that includes Hysterectomy (1975); Vein Surgery (1988); Lithotripsy (2004); and eye surgery (2010). CURRENT MEDICATIONS       Previous Medications    ALBUTEROL SULFATE  (90 BASE) MCG/ACT INHALER    Inhale 2 puffs into the lungs every morning (before breakfast)    ALENDRONATE (FOSAMAX) 70 MG TABLET    Take 1 tablet by mouth every 7 days    AMLODIPINE (NORVASC) 5 MG TABLET    TAKE 1 TABLET BY MOUTH DAILY. ASCORBIC ACID (VITAMIN C) 500 MG TABLET    Take 500 mg by mouth daily.       ASPIRIN 81 MG EC TABLET    Take 1 tablet by mouth daily    ATORVASTATIN (LIPITOR) 20 MG TABLET    Take 1 tablet by mouth daily    CALCIUM CARBONATE 648 MG TABS    Take 1 tablet by mouth 2 times daily    COENZYME Q10 100 MG CAPS CAPSULE    Take 100 mg by mouth daily    DONEPEZIL (ARICEPT) 5 MG TABLET    Take 1 tablet by mouth nightly    ESCITALOPRAM (LEXAPRO) 10 MG TABLET    Take 1 tablet by mouth daily    FLUTICASONE (FLONASE) 50 MCG/ACT NASAL SPRAY    2 sprays by Nasal route daily    MULTIPLE VITAMINS-MINERALS (PRESERVISION AREDS 2) CAPS    Take 1 capsule by mouth daily    OXYGEN    Start oxygen therapy at 2 lit with activity and sleep , give POC for ambulation     Dx COPD    UMECLIDINIUM-VILANTEROL (ANORO ELLIPTA) 62.5-25 MCG/INH AEPB INHALER    Inhale 1 puff into the lungs daily    VITAMIN D (CHOLECALCIFEROL) 1000 UNIT TABS TABLET    Take 1,000 Int'l Units by mouth daily. ALLERGIES     Patient is has No Known Allergies. FAMILY HISTORY     Patient'sfamily history includes Diabetes in an other family member. SOCIAL HISTORY     Patient  reports that she quit smoking about 2 years ago. Her smoking use included cigarettes. She started smoking about 54 years ago. She has a 33.75 pack-year smoking history. She has never used smokeless tobacco. She reports that she does not drink alcohol and does not use drugs. PHYSICAL EXAM     VITALS  BP: 110/70, Temp: 97 °F (36.1 °C), Pulse: 89,  , SpO2: 97 %  Physical Exam  Vitals and nursing note reviewed. Constitutional:       General: She is awake. She is not in acute distress. Appearance: Normal appearance. She is well-developed. She is obese. She is not ill-appearing, toxic-appearing or diaphoretic. Interventions: Nasal cannula in place. Comments: Patient is using oxygen concentrator    HENT:      Head: Normocephalic and atraumatic. Right Ear: Hearing and external ear normal.      Left Ear: Hearing and external ear normal.      Nose: Nose normal.      Mouth/Throat:      Lips: Pink. Pharynx: Oropharynx is clear. Uvula midline. Eyes:      General: Lids are normal. Vision grossly intact. Gaze aligned appropriately. Conjunctiva/sclera: Conjunctivae normal.   Cardiovascular:      Rate and Rhythm: Normal rate and regular rhythm. Pulses: Normal pulses. Heart sounds: S1 normal and S2 normal. Heart sounds are distant. Pulmonary:      Effort: Pulmonary effort is normal.      Breath sounds: Normal breath sounds and air entry. No decreased breath sounds, wheezing, rhonchi or rales. Musculoskeletal:      Cervical back: Normal range of motion. Left upper leg: Tenderness present. Comments: Decrease internal and external rotation     Skin:     General: Skin is warm.       Capillary Refill: Capillary refill takes less than 2 seconds. Neurological:      Mental Status: She is alert. Mental status is at baseline. Gait: Gait is intact. Psychiatric:         Attention and Perception: Attention normal.         Mood and Affect: Mood normal.         Speech: Speech normal.         Behavior: Behavior normal. Behavior is cooperative. READY CARE COURSE   Labs:  No results found for this visit on 05/17/21. IMAGING:  No orders to display     Scheduled Meds:  Continuous Infusions:  PRN Meds:. PROCEDURES:  FINAL IMPRESSION      1. Viral gastroenteritis    2. Primary osteoarthritis of left hip      DISPOSITION/PLAN   1. The patient's symptoms have greatly improved since Saturday. She is starting to eat a little more today. Recommended that the patient start with bland foods to prevent stomach from becoming upset. Additionally, the patient is to continue hydration. Discussed signs and symptoms which require immediate follow-up in ED/call to 911. Patient verbalized understanding. 2. The patient has history of OA. Recommend gentle ROM exercises. Recommended aquatic therapy to help decrease joint pain. If symptoms continue, could revisit ortho for further treatment. On this date 5/17/2021 I have spent 20 minutes reviewing previous notes, test results and face to face with the patient discussing the diagnosis and importance of compliance with the treatment plan as well as documenting on the day of the visit. PATIENT REFERRED TO:  Return for Follow up with PCP. DISCHARGE MEDICATIONS:  New Prescriptions    No medications on file     Cannot display discharge medications since this is not an admission.        Marisela Claude, [place]

## 2021-06-03 NOTE — TELEPHONE ENCOUNTER
Shanon Nowak,  My mom is scheduled to have her left hip replaced on June 25th. She needs to meet with you to get approval for anesthesia and the surgery.   I'd like to schedule a appointment to get her check up and approval.      Thank you,   Sparkle Monahan [facility] Luis's son)

## 2021-06-04 NOTE — PROGRESS NOTES
Subjective: Sarina Schuster is a 80 y.o. female who complains today of:     Chief Complaint   Patient presents with    COPD     office visit surgical clearance       HPI  She is going for  Left hip replacement , she needs clearance. SpO2  77%  When she came to office . She is using 2 lit  With sleep and prn during daytime. She is not compliant using O2 . C/o shortness of breath with exertion. On and off Wheezing. No Cough with  Sputum  No Hemoptysis  No Chest tightness   No Chest pain with radiation  or pleuritic pain  No  leg edema   No orthopnea  No Fever or chills. No Rhinorrhea and postnasal drip. She is using bronchodilator with , Anoro Ellipta 1 puff daily, Albuterol HFA prn base. Allergies:  Patient has no known allergies.   Past Medical History:   Diagnosis Date    COPD (chronic obstructive pulmonary disease) (HCC)     Dyspnea on exertion 7/31/2019    Hypertension     Impaired cognition     Irritable bowel syndrome without diarrhea 12/22/2020    Mild cognitive impairment with memory loss 9/25/2020    Osteoarthritis     Tobacco abuse 5/6/2019    Tobacco use disorder 5/6/2019    Vascular dementia ([facility] [address] 6/22/2020     Past Surgical History:   Procedure Laterality Date    EYE SURGERY  01/01/2010    PER PROBLEM SHEET    HYSTERECTOMY  01/01/1975    PER PROBLEM SHEET    LITHOTRIPSY  01/01/2004    PER PROBLEM SHEET    TOTAL HIP ARTHROPLASTY Right 2019    VEIN SURGERY  01/01/1988    PER PROBLEM SHEET     Family History   Problem Relation Age of Onset    Diabetes Other         GRANDMOTHER     Social History     Socioeconomic History    Marital status:      Spouse name: Not on file    Number of children: Not on file    Years of education: Not on file    Highest education level: Not on file   Occupational History    Not on file   Tobacco Use    Smoking status: Former Smoker     Packs/day: 0.75     Years: 45.00     Pack years: 33.75     Types: Cigarettes     Start date: 5     Quit date: 2019     Years since quittin.3    Smokeless tobacco: Never Used   Vaping Use    Vaping Use: Never used   Substance and Sexual Activity    Alcohol use: No     Comment: social    Drug use: No    Sexual activity: Not on file   Other Topics Concern    Not on file   Social History Narrative         Lives With: Alone son is helpful and is her POA for health care    Type of Home: House    Home Layout: One level    Home Access: Stairs to enter with rails    Entrance Stairs - Number of Steps: 2    Bathroom Shower/Tub: Tub/Shower unit, Walk-in shower    Bathroom Equipment: Hand-held shower    ADL Assistance: Independent    Homemaking Assistance: Independent    Ambulation Assistance: Independent(No AD)    Transfer Assistance: Independent    Active : Yes    Additional Comments: Pt. reports she has fallen at home but not recently and not regularly. Pt. having word finding difficulty, but sons are present and clarify pt's answers     Social Determinants of Health     Financial Resource Strain:     Difficulty of Paying Living Expenses:    Food Insecurity:     Worried About Running Out of Food in the Last Year:     [address] in the Last Year:    Transportation Needs:     Lack of Transportation (Medical):  Lack of Transportation (Non-Medical):    Physical Activity:     Days of Exercise per Week:     Minutes of Exercise per Session:    Stress:     Feeling of Stress :    Social Connections:     Frequency of Communication with Friends and Family:     Frequency of Social Gatherings with Friends and Family:     Attends Protestant Services:     Active Member of Clubs or Organizations:     Attends Club or Organization Meetings:     Marital Status:    Intimate Partner Violence:     Fear of Current or Ex-Partner:     Emotionally Abused:     Physically Abused:     Sexually Abused:          Review of Systems   Constitutional: Negative for chills, diaphoresis, fatigue and fever. HENT: Negative for congestion, mouth sores, nosebleeds, postnasal drip, rhinorrhea, sinus pressure, sneezing, sore throat, trouble swallowing and voice change. Eyes: Negative for discharge, itching and visual disturbance. Respiratory: Positive for shortness of breath and wheezing. Negative for cough and chest tightness. Cardiovascular: Negative for chest pain, palpitations and leg swelling. Gastrointestinal: Negative for abdominal pain, diarrhea, nausea and vomiting. Genitourinary: Negative for difficulty urinating and hematuria. Musculoskeletal: Negative for arthralgias, joint swelling and myalgias. Skin: Negative for rash. Allergic/Immunologic: Negative for environmental allergies and food allergies. Neurological: Negative for dizziness, tremors, weakness and headaches. Psychiatric/Behavioral: Negative for behavioral problems and sleep disturbance.         :     Vitals:    06/04/21 0907   BP: 124/83   Pulse: 78   Temp: 98.1 °F (36.7 °C)   SpO2: (!) 77%   Weight: 163 lb (73.9 kg)   Height: 5' (1.524 m)     Wt Readings from Last 3 Encounters:   06/04/21 163 lb (73.9 kg)   05/17/21 162 lb (73.5 kg)   04/22/21 171 lb (77.6 kg)         Physical Exam  Constitutional:       General: She is not in acute distress. Appearance: She is well-developed. She is not diaphoretic. HENT:      Head: Normocephalic and atraumatic. Nose: Nose normal.   Eyes:      Pupils: Pupils are equal, round, and reactive to light. Neck:      Thyroid: No thyromegaly. Vascular: No JVD. Trachea: No tracheal deviation. Cardiovascular:      Rate and Rhythm: Normal rate and regular rhythm. Heart sounds: No murmur heard. No friction rub. No gallop. Pulmonary:      Effort: No respiratory distress. Breath sounds: No wheezing or rales. Comments: diminished Breath sound bilaterally. Chest:      Chest wall: No tenderness. Abdominal:      General: There is no distension.       Tenderness: There is no abdominal tenderness. There is no rebound. Musculoskeletal:         General: Normal range of motion. Lymphadenopathy:      Cervical: No cervical adenopathy. Skin:     General: Skin is warm and dry. Neurological:      Mental Status: She is alert and oriented to person, place, and time. Coordination: Coordination normal.         Current Outpatient Medications   Medication Sig Dispense Refill    umeclidinium-vilanterol (ANORO ELLIPTA) 62.5-25 MCG/INH AEPB inhaler Inhale 1 puff into the lungs daily 90 puff 1    alendronate (FOSAMAX) 70 MG tablet Take 1 tablet by mouth every 7 days 12 tablet 4    amLODIPine (NORVASC) 5 MG tablet TAKE 1 TABLET BY MOUTH DAILY. 90 tablet 4    atorvastatin (LIPITOR) 20 MG tablet Take 1 tablet by mouth daily 90 tablet 4    escitalopram (LEXAPRO) 10 MG tablet Take 1 tablet by mouth daily 90 tablet 1    albuterol sulfate  (90 Base) MCG/ACT inhaler Inhale 2 puffs into the lungs every morning (before breakfast) 1 Inhaler 3    fluticasone (FLONASE) 50 MCG/ACT nasal spray 2 sprays by Nasal route daily 1 Bottle 5    calcium carbonate 648 MG TABS Take 1 tablet by mouth 2 times daily 180 tablet 4    OXYGEN Start oxygen therapy at 2 lit with activity and sleep , give POC for ambulation     Dx COPD 1 Units 0    aspirin 81 MG EC tablet Take 1 tablet by mouth daily 30 tablet 3    Multiple Vitamins-Minerals (PRESERVISION AREDS 2) CAPS Take 1 capsule by mouth daily 90 capsule 3    coenzyme Q10 100 MG CAPS capsule Take 100 mg by mouth daily      Ascorbic Acid (VITAMIN C) 500 MG tablet Take 500 mg by mouth daily.  vitamin D (CHOLECALCIFEROL) 1000 UNIT TABS tablet Take 1,000 Int'l Units by mouth daily.  donepezil (ARICEPT) 5 MG tablet Take 1 tablet by mouth nightly 90 tablet 4     No current facility-administered medications for this visit.        No results found for this or any previous visit.  ]  Results for orders placed during the hospital encounter of 11/12/19    XR CHEST PORTABLE    Narrative  XR CHEST PORTABLE    Exam Date/Time:  11/12/2019 3:30 PM  Clinical History:   CVA  Comparison:  7/20/2014    RESULT:    Lines, tubes, and devices:  None. Lungs and pleura:  No consolidation. No pleural effusion. No pneumothorax. Normal pulmonary vascular pattern. Cardiomediastinal silhouette:  Normal. Aortic atherosclerotic calcification. Other:  No acute osseous findings. Assessment/Plan:     1. Encounter for pre-operative respiratory clearance  She is going for hip replacement. She is on 2 L O2 via nasal cannula. She has underlying COPD I will request chest x-ray and PFT and ABG for further evaluation prior to clearance for surgery. - XR CHEST STANDARD (2 VW); Future  - Full PFT Study With Bronchodilator; Future  - COVID-19 Ambulatory; Future    2. Chronic obstructive pulmonary disease, unspecified COPD type ([address]  C/o shortness of breath with exertion. On and off Wheezing. No Cough with  Sputum. She is using bronchodilator with , Anoro Ellipta 1 puff daily, Albuterol HFA prn base. Continue O2 to keep saturation 90% above and and bronchodilator therapy as before    3. Hypoxia  Patient is on 2L O2 with sleep and prn , she is not compliant with oxygen. Her O2 saturation was 77% when she came in office. Recommend to use oxygen 24 hours a day, keep saturation 90% or above. 4. Obesity (BMI 30-39. 9)  She is advised try to lose weight. obesity related risk explained to the patient ,  Current weight:  163 lb (73.9 kg) Lbs. BMI:  Body mass index is 31.83 kg/m². Suggested weight control approaches, including dietary changes , exercise, behavioral modification. Return in about 2 weeks (around 6/18/2021) for COPD, hypoxia on O2.       Chelly Bond MD

## 2021-06-04 NOTE — PROGRESS NOTES
Merle Camacho is a 80 y.o. female evaluated via telephone on 2021. Consent:  She and/or health care decision maker is aware that that she may receive a bill for this telephone service, depending on her insurance coverage, and has provided verbal consent to proceed: Yes      I affirm this is a Patient Initiated Episode with an Established Patient who has not had a related appointment within my department in the past 7 days or scheduled within the next 24 hours. Total Time:     Note: not billable if this call serves to triage the patient into an appointment for the relevant concern      Jane Vasques MD     2021    TELEHEALTH EVALUATION -- Audio (During [facility] public health emergency)    Chief Complaint   Patient presents with    Abdominal Pain       HPI     Merle Camacho (:  1940) has requested an audio evaluation for the following concern(s):    Plans to have her left hip replaced because of progressive  \"pain in my leg. \". After the last surgery she experienced worsening alteration of mental status and CVA \"two strokes. \" Has been scheduled for . Other than hip pain she has no complaints of abdominal pain or chest pain. No shortness of breath beyond baseline. Does use O2 at night and prn for moderate to severe COPD. Appetite is normal and no unintentional weight loss. Has increased anxiety. \"I feel wonderful. I really do. \"     H/O CVA. No new deficits. No facial droop. No speech impairment. No unilateral weakness. She states that her BP has been WNL. Gained weight after she stopped smoking and has lost 8-9 pounds but feels like she should have lost weight much faster. \"I am used to losing weight fast.\"     Pre-Op eval - no prior adverse reaction to anesthesia; no h/o VTE but POS h/o CVA post-operatively; would consent to PRBC transfusion. Not on DAP/anticoag. Prior to Visit Medications    Medication Sig Taking?  Authorizing Provider   donepezil (ARICEPT) 5 MG tablet Take 1 tablet by mouth nightly  Aguila Mix MD   umeclidinium-vilanterol (ANORO ELLIPTA) 62.5-25 MCG/INH AEPB inhaler Inhale 1 puff into the lungs daily  Ethan Turner MD   alendronate (FOSAMAX) 70 MG tablet Take 1 tablet by mouth every 7 days  Wai Bennett MD   amLODIPine (NORVASC) 5 MG tablet TAKE 1 TABLET BY MOUTH DAILY. Wai Bennett MD   atorvastatin (LIPITOR) 20 MG tablet Take 1 tablet by mouth daily  Wai Bennett MD   escitalopram (LEXAPRO) 10 MG tablet Take 1 tablet by mouth daily  Wai Bennett MD   albuterol sulfate  (90 Base) MCG/ACT inhaler Inhale 2 puffs into the lungs every morning (before breakfast)  Ethan Turner MD   fluticasone (FLONASE) 50 MCG/ACT nasal spray 2 sprays by Nasal route daily  Ethan Turner MD   calcium carbonate 648 MG TABS Take 1 tablet by mouth 2 times daily  Wai Bennett MD   OXYGEN Start oxygen therapy at 2 lit with activity and sleep , give POC for ambulation     Dx COPD  Ethan Turnre MD   aspirin 81 MG EC tablet Take 1 tablet by mouth daily  Pitney Trey, DO   Multiple Vitamins-Minerals (PRESERVISION AREDS 2) CAPS Take 1 capsule by mouth daily  Wai Bennett MD   coenzyme Q10 100 MG CAPS capsule Take 100 mg by mouth daily  Historical Provider, MD   Ascorbic Acid (VITAMIN C) 500 MG tablet Take 500 mg by mouth daily. Historical Provider, MD   vitamin D (CHOLECALCIFEROL) 1000 UNIT TABS tablet Take 1,000 Int'l Units by mouth daily. Historical Provider, MD             PHYSICAL EXAMINATION:    Patient-Reported Vitals 11/30/2020   Patient-Reported Weight 170lb   Patient-Reported Height 5          Patient appears to be alert and oriented to person, place, time, situation and is in no acute distress. Respiratory effort appears normal. Mood appears stable. Speech is loquacious with flight of thought and tangential thought.     Lab Results   Component Value (During [facility]-82 public health emergency), evaluation of the following organ systems was limited: Vitals/Constitutional/EENT/Resp/CV/GI//MS/Neuro/Skin/Heme-Lymph-Imm. Pursuant to the emergency declaration under the [address] [place] [address], [address] [place] [address] and the [employer] and [employer] Act, this Virtual Visit was conducted with patient's (and/or legal guardian's) consent, to reduce the patient's risk of exposure to COVID-19 and provide necessary medical care. The patient (and/or legal guardian) has also been advised to contact this office for worsening conditions or problems, and seek emergency medical treatment and/or call 911 if deemed necessary. Services were provided through a telephonic synchronous discussion virtually to substitute for in-person clinic visit. Patient and provider were located at their individual homes. --Kathryn Fisher MD on 6/19/2021 at 5:04 PM    An electronic signature was used to authenticate this note.

## 2021-06-07 NOTE — TELEPHONE ENCOUNTER
Pt isn't new she has a appt scheduled 09/29/21 at 1:15pm. I called the patient's son and left voicemail for him to call us back there was a cancellation tomorrow at 3:00pm that I can put her in if her son calls us back.     Ignacio Vasquez

## 2021-06-08 PROBLEM — R27.0 ATAXIA: Status: ACTIVE | Noted: 2021-01-01

## 2021-06-08 NOTE — PROGRESS NOTES
Subjective:      Patient ID: Jaime Marie is a 80 y.o. female who presents today for:  Chief Complaint   Patient presents with    Follow-up     pts sons where wondering if her brusing more easily because of aspirin. Should she lower it to every other day. Also she needs clearance for her surgery and they want her off of any blood thinners before hand. How long before surgery should she stop aspirin and when should she restart as well. HPI 72-year-old right-handed female history of vascular dementia with strokes and strokelike symptoms. Patient has memory disorder and continues on Aricept. She is also on aspirin 81 mg. She is due to have surgery for her hip and requires a clearance  Had reporting bruising over she showed me his pigmentation and therefore does not require a change of medication patient is now moved with another son to live with and she is actually doing better there is some positivity as she has good days and bad days and we are aware that this is a vasculopathy.   Most of this appears to be mild cognitive impairment    Past Medical History:   Diagnosis Date    COPD (chronic obstructive pulmonary disease) (HCC)     Dyspnea on exertion 7/31/2019    Hypertension     Impaired cognition     Irritable bowel syndrome without diarrhea 12/22/2020    Mild cognitive impairment with memory loss 9/25/2020    Osteoarthritis     Tobacco abuse 5/6/2019    Tobacco use disorder 5/6/2019    Vascular dementia ([facility] [address] 6/22/2020     Past Surgical History:   Procedure Laterality Date    EYE SURGERY  01/01/2010    PER PROBLEM SHEET    HYSTERECTOMY  01/01/1975    PER PROBLEM SHEET    LITHOTRIPSY  01/01/2004    PER PROBLEM SHEET    TOTAL HIP ARTHROPLASTY Right 2019    VEIN SURGERY  01/01/1988    PER PROBLEM SHEET     Social History     Socioeconomic History    Marital status:      Spouse name: Not on file    Number of children: Not on file    Years of education: Not on file    Highest Violence:     Fear of Current or Ex-Partner:     Emotionally Abused:     Physically Abused:     Sexually Abused:      Family History   Problem Relation Age of Onset    Diabetes Other         GRANDMOTHER     No Known Allergies    Current Outpatient Medications   Medication Sig Dispense Refill    donepezil (ARICEPT) 5 MG tablet Take 1 tablet by mouth nightly 90 tablet 4    umeclidinium-vilanterol (ANORO ELLIPTA) 62.5-25 MCG/INH AEPB inhaler Inhale 1 puff into the lungs daily 90 puff 1    alendronate (FOSAMAX) 70 MG tablet Take 1 tablet by mouth every 7 days 12 tablet 4    amLODIPine (NORVASC) 5 MG tablet TAKE 1 TABLET BY MOUTH DAILY. 90 tablet 4    atorvastatin (LIPITOR) 20 MG tablet Take 1 tablet by mouth daily 90 tablet 4    escitalopram (LEXAPRO) 10 MG tablet Take 1 tablet by mouth daily 90 tablet 1    albuterol sulfate  (90 Base) MCG/ACT inhaler Inhale 2 puffs into the lungs every morning (before breakfast) 1 Inhaler 3    fluticasone (FLONASE) 50 MCG/ACT nasal spray 2 sprays by Nasal route daily 1 Bottle 5    calcium carbonate 648 MG TABS Take 1 tablet by mouth 2 times daily 180 tablet 4    OXYGEN Start oxygen therapy at 2 lit with activity and sleep , give POC for ambulation     Dx COPD 1 Units 0    aspirin 81 MG EC tablet Take 1 tablet by mouth daily 30 tablet 3    Multiple Vitamins-Minerals (PRESERVISION AREDS 2) CAPS Take 1 capsule by mouth daily 90 capsule 3    coenzyme Q10 100 MG CAPS capsule Take 100 mg by mouth daily      Ascorbic Acid (VITAMIN C) 500 MG tablet Take 500 mg by mouth daily.  vitamin D (CHOLECALCIFEROL) 1000 UNIT TABS tablet Take 1,000 Int'l Units by mouth daily. No current facility-administered medications for this visit. Review of Systems   Constitutional: Negative for fever. HENT: Negative for ear pain, tinnitus and trouble swallowing. Eyes: Negative for photophobia and visual disturbance.    Respiratory: Negative for choking and shortness of breath. Cardiovascular: Negative for chest pain and palpitations. Gastrointestinal: Negative for nausea and vomiting. Musculoskeletal: Negative for back pain, gait problem, joint swelling, myalgias, neck pain and neck stiffness. Skin: Negative for color change. Allergic/Immunologic: Negative for food allergies. Neurological: Negative for dizziness, tremors, seizures, syncope, facial asymmetry, speech difficulty, weakness, light-headedness, numbness and headaches. Psychiatric/Behavioral: Negative for behavioral problems, confusion, hallucinations and sleep disturbance. Objective:   /88 (Site: Left Upper Arm, Position: Sitting, Cuff Size: Medium Adult)   Pulse 74   Wt 164 lb (74.4 kg)   LMP  (LMP Unknown)   BMI 32.03 kg/m²     Physical Exam  Vitals reviewed. Eyes:      Pupils: Pupils are equal, round, and reactive to light. Cardiovascular:      Rate and Rhythm: Normal rate and regular rhythm. Heart sounds: No murmur heard. Pulmonary:      Effort: Pulmonary effort is normal.      Breath sounds: Normal breath sounds. Abdominal:      General: Bowel sounds are normal.   Musculoskeletal:         General: Normal range of motion. Cervical back: Normal range of motion. Skin:     General: Skin is warm. Neurological:      Mental Status: She is alert and oriented to person, place, and time. Cranial Nerves: No cranial nerve deficit. Sensory: No sensory deficit. Motor: No abnormal muscle tone. Coordination: Coordination normal.      Deep Tendon Reflexes: Reflexes are normal and symmetric. Babinski sign absent on the right side. Babinski sign absent on the left side. Psychiatric:         Mood and Affect: Mood normal.     Bruising is notable and she walks with a walker. XR CHEST (2 VW)    Result Date: 6/4/2021  EXAMINATION: XR CHEST (2 VW) CLINICAL HISTORY: HIP REPLACEMENT. Strokelike symptoms and strokes. . COMPARISONS: NOVEMBER 12, 2019 FINDINGS: Osseous structures are intact. Cardiopericardial silhouette is normal. Aorta calcified. Pulmonary vasculature is normal. Lungs are clear. NO ACUTE CARDIOPULMONARY DISEASE. Lab Results   Component Value Date    WBC 4.0 11/13/2019    RBC 4.29 11/13/2019    RBC 4.30 02/06/2012    HGB 13.0 11/13/2019    HCT 40.1 11/13/2019    MCV 93.4 11/13/2019    MCH 30.3 11/13/2019    MCHC 32.5 11/13/2019    RDW 13.9 11/13/2019     11/13/2019    MPV 8.9 07/20/2014     Lab Results   Component Value Date     04/22/2021    K 4.0 04/22/2021    K 3.6 11/13/2019     04/22/2021    CO2 27 04/22/2021    BUN 18 04/22/2021    CREATININE 0.63 04/22/2021    GFRAA >60.0 04/22/2021    LABGLOM >60.0 04/22/2021    GLUCOSE 91 04/22/2021    GLUCOSE 83 02/06/2012    PROT 7.1 11/12/2019    LABALBU 4.0 11/12/2019    LABALBU 4.0 02/06/2012    CALCIUM 9.0 04/22/2021    BILITOT 0.3 11/12/2019    ALKPHOS 88 11/12/2019    AST 16 11/12/2019    ALT 10 11/12/2019     Lab Results   Component Value Date    PROTIME 13.0 11/12/2019    INR 0.9 11/12/2019     Lab Results   Component Value Date    TSH 1.760 05/06/2019    BSPKHSLV68 276 11/13/2019    FOLATE 16.8 11/13/2019     Lab Results   Component Value Date    TRIG 131 02/11/2020    HDL 59 04/22/2021    HDL 66 11/02/2010    LDLCALC 64 04/22/2021    LABVLDL 26 02/11/2020     Lab Results   Component Value Date    LABAMPH Neg 07/20/2014    BARBSCNU Neg 07/20/2014    LABBENZ Neg 07/20/2014    OPIATESCREENURINE Neg 07/20/2014    PHENCYCLIDINESCREENURINE Neg 07/20/2014     No results found for: LITHIUM, DILFRTOT, VALPROATE    Assessment:       Diagnosis Orders   1. Cerebrovascular accident (CVA) due to stenosis of left anterior cerebral artery (HCC)     2. Vascular dementia without behavioral disturbance (HCC)  donepezil (ARICEPT) 5 MG tablet    stable and well-control on current meds   3. Ataxia       Cerebrovascular disease with vascular dementia which is fluctuating.   Patient is on Aricept 5 mg which she will continue to infect now she is moved with her eldest son and she actually is doing much better. She does fluctuate but she is doing more than she used to do. She is due for hip surgery in the next few weeks so we have no hesitation of surgery from neurological standpoint she may be discouraged from aspirin for a few days if needed. Patient had complaints of bruising though this is not bruising mostly this is pigmentation and therefore not recommended changing her dose. We will keep an eye on this and continue to follow in 6 months   Plan:      No orders of the defined types were placed in this encounter. Orders Placed This Encounter   Medications    donepezil (ARICEPT) 5 MG tablet     Sig: Take 1 tablet by mouth nightly     Dispense:  90 tablet     Refill:  4       No follow-ups on file.       Martin Harp MD

## 2021-06-11 NOTE — TELEPHONE ENCOUNTER
Son called and states That his mom is having her left hip replaced. He states that he is concerned about her going under anesthesia with her dementia . He wanted your input on it.  Also she is having a spinal with [facility]     Please advise

## 2021-06-18 NOTE — PROCEDURES
[address]                      [address] [facility]   80 y.[facility].   female  Height 62 in  Weight 164 lb      Referring provider   Son Martinez MD    Reading provider   Celina Love MD    Test meets ATS criteria for acceptability and reproducibility Yes    Diagnosis: BYRNE: Yes  Cough   No, wheezing Yes  Smoking   quit 2 years ago    Spirometry   FVC            2.36 L   97%  Post bronchodilator 2.33 L  96% Change -1 %  FEV1          1.33 L  74%   Post bronchodilator  1.32 L  73%   Change -1%  FEV1/FVC  56  %             Post bronchodilator  56 %  AHR45-08% 0.55 L  41%  Post bronchodilator  0.54 L  40%   Change -2%    Lung volume   SVC           2.43 L  100%   RV              2.83 L 124%   TLC            5.26  L 110%   RV/TLC      53 %    DLCO           50 %     Test interpretation     Spirometry meets ATS criteria for mild obstructive airway disease with no significant response to bronchodilator  Lung volume shows mild air trapping  Diffusion capacity is moderately reduced.        Clinical correlation is recommended     Celina Love MD [facility], 6/18/2021 11:30 AM

## 2021-06-21 NOTE — PROGRESS NOTES
Preoperative Consultation      Janina Le  YOB: 1940    Date of Service:  6/21/2021    Vitals:    06/21/21 1043   BP: 120/86   Site: Right Upper Arm   Position: Sitting   Cuff Size: Small Adult   Pulse: 107   Temp: 96.3 °F (35.7 °C)   SpO2: (!) 84%   Weight: 162 lb 3.2 oz (73.6 kg)   Height: 5' (1.524 m)      Wt Readings from Last 2 Encounters:   06/21/21 162 lb 3.2 oz (73.6 kg)   06/08/21 164 lb (74.4 kg)     BP Readings from Last 3 Encounters:   06/21/21 120/86   06/08/21 122/88   06/04/21 124/83        Chief Complaint   Patient presents with    Pre-op Exam     Pt is here for pre-op examination. No Known Allergies  Outpatient Medications Marked as Taking for the 6/21/21 encounter (Office Visit) with ALISON Mckenzie   Medication Sig Dispense Refill    donepezil (ARICEPT) 5 MG tablet Take 1 tablet by mouth nightly 90 tablet 4    umeclidinium-vilanterol (ANORO ELLIPTA) 62.5-25 MCG/INH AEPB inhaler Inhale 1 puff into the lungs daily 90 puff 1    alendronate (FOSAMAX) 70 MG tablet Take 1 tablet by mouth every 7 days 12 tablet 4    amLODIPine (NORVASC) 5 MG tablet TAKE 1 TABLET BY MOUTH DAILY.  90 tablet 4    atorvastatin (LIPITOR) 20 MG tablet Take 1 tablet by mouth daily 90 tablet 4    escitalopram (LEXAPRO) 10 MG tablet Take 1 tablet by mouth daily 90 tablet 1    albuterol sulfate  (90 Base) MCG/ACT inhaler Inhale 2 puffs into the lungs every morning (before breakfast) 1 Inhaler 3    fluticasone (FLONASE) 50 MCG/ACT nasal spray 2 sprays by Nasal route daily 1 Bottle 5    calcium carbonate 648 MG TABS Take 1 tablet by mouth 2 times daily 180 tablet 4    OXYGEN Start oxygen therapy at 2 lit with activity and sleep , give POC for ambulation     Dx COPD 1 Units 0    aspirin 81 MG EC tablet Take 1 tablet by mouth daily 30 tablet 3    Multiple Vitamins-Minerals (PRESERVISION AREDS 2) CAPS Take 1 capsule by mouth daily 90 capsule 3    coenzyme Q10 100 MG CAPS capsule  Lack of Transportation (Non-Medical):    Physical Activity:     Days of Exercise per Week:     Minutes of Exercise per Session:    Stress:     Feeling of Stress :    Social Connections:     Frequency of Communication with Friends and Family:     Frequency of Social Gatherings with Friends and Family:     Attends Confucianism Services:     Active Member of Clubs or Organizations:     Attends Club or Organization Meetings:     Marital Status:    Intimate Partner Violence:     Fear of Current or Ex-Partner:     Emotionally Abused:     Physically Abused:     Sexually Abused:        Review of Systems  A comprehensive review of systems was negative except for what was noted in the HPI. Physical Exam   Constitutional: She is oriented to person, place, and time. She appears well-developed and well-nourished. No distress. HENT:   Head: Normocephalic and atraumatic. Mouth/Throat: Uvula is midline, oropharynx is clear and moist and mucous membranes are normal.   Eyes: Conjunctivae and EOM are normal. Pupils are equal, round, and reactive to light. Neck: Trachea normal and normal range of motion. Neck supple. No JVD present. Carotid bruit is not present. No mass and no thyromegaly present. Cardiovascular: Normal rate, regular rhythm, normal heart sounds and intact distal pulses. Exam reveals no gallop and no friction rub. No murmur heard. Pulmonary/Chest: Effort normal and breath sounds normal. No respiratory distress. She has no wheezes. She has no rales. Abdominal: Soft. Normal aorta and bowel sounds are normal. She exhibits no distension and no mass. There is no hepatosplenomegaly. No tenderness. Musculoskeletal: She exhibits no edema and no tenderness. Neurological: She is alert and oriented to person, place, and time. She has normal strength. No cranial nerve deficit or sensory deficit. Coordination and gait normal.   Skin: Skin is warm and dry. No rash noted. No erythema.    Psychiatric: She has a normal mood and affect. Her behavior is normal.     EKG Interpretation:  normal EKG, normal sinus rhythm, unchanged from previous tracings. Lab Review   Orders Only on 04/22/2021   Component Date Value    Cholesterol, Fasting 04/22/2021 144     Triglyceride, Fasting 04/22/2021 104     HDL 04/22/2021 59     LDL Calculated 04/22/2021 64     Sodium 04/22/2021 143     Potassium 04/22/2021 4.0     Chloride 04/22/2021 106     CO2 04/22/2021 27     Anion Gap 04/22/2021 10     Glucose 04/22/2021 91     BUN 04/22/2021 18     CREATININE 04/22/2021 0.63     GFR Non- 04/22/2021 >60.0     GFR  04/22/2021 >60.0     Calcium 04/22/2021 9.0            Assessment:       80 y.o. patient with planned surgery as above. Known risk factors for perioperative complications: COPD, Hypertension  Current medications which may produce withdrawal symptoms if withheld perioperatively: none      Plan:     1. Preoperative workup as follows: ECG, hemoglobin, hematocrit, electrolytes, creatinine, glucose, coagulation studies  2. Change in medication regimen before surgery: None  3. Prophylaxis for cardiac events with perioperative beta-blockers: Not indicated  ACC/AHA indications for pre-operative beta-blocker use:    · Vascular surgery with history of postitive stress test  · Intermediate or high risk surgery with history of CAD   · Intermediate or high risk surgery with multiple clinical predictors of CAD- 2 of the following: history of compensated or prior heart failure, history of cerebrovascular disease, DM, or renal insufficiency    Routine administration of higher-dose, long-acting metoprolol in beta-blockernaïve patients on the day of surgery, and in the absence of dose titration is associated with an overall increase in mortality. Beta-blockers should be started days to weeks prior to surgery and titrated to pulse < 70.  4. Deep vein thrombosis prophylaxis: per anesthesia  5.

## 2021-06-21 NOTE — PROGRESS NOTES
Subjective: Sunny Neal is a 80 y.o. female who complains today of:     Chief Complaint   Patient presents with    COPD     2 week f/u       HPI  She had CXR and PFT done and came for f/u   She is on 2 lit O2   24 hour a day,  C/o shortness of breath with exertion. On and off Wheezing. No Cough with  Sputum  No Hemoptysis  No Chest tightness   No Chest pain with radiation  or pleuritic pain  No  leg edema   No orthopnea  No Fever or chills. No Rhinorrhea and postnasal drip. She is using bronchodilator with , Anoro Ellipta 1 puff daily, Albuterol HFA prn base. Allergies:  Patient has no known allergies.   Past Medical History:   Diagnosis Date    COPD (chronic obstructive pulmonary disease) (HCC)     Dyspnea on exertion 2019    Hypertension     Impaired cognition     Irritable bowel syndrome without diarrhea 2020    Mild cognitive impairment with memory loss 2020    Osteoarthritis     Tobacco abuse 2019    Tobacco use disorder 2019    Vascular dementia ([facility] [address] 2020     Past Surgical History:   Procedure Laterality Date    EYE SURGERY  2010    PER PROBLEM SHEET    HYSTERECTOMY  1975    PER PROBLEM SHEET    LITHOTRIPSY  2004    PER PROBLEM SHEET    TOTAL HIP ARTHROPLASTY Right 2019    VEIN SURGERY  1988    PER PROBLEM SHEET     Family History   Problem Relation Age of Onset    Diabetes Other         GRANDMOTHER     Social History     Socioeconomic History    Marital status:      Spouse name: Not on file    Number of children: Not on file    Years of education: Not on file    Highest education level: Not on file   Occupational History    Not on file   Tobacco Use    Smoking status: Former Smoker     Packs/day: 0.75     Years: 45.00     Pack years: 33.75     Types: Cigarettes     Start date: 5     Quit date: 2019     Years since quittin.3    Smokeless tobacco: Never Used   Vaping Use    Vaping Use: Never used   Substance and Sexual Activity    Alcohol use: No     Comment: social    Drug use: No    Sexual activity: Not on file   Other Topics Concern    Not on file   Social History Narrative         Lives With: Alone son is helpful and is her POA for health care    Type of Home: House    Home Layout: One level    Home Access: Stairs to enter with rails    Entrance Stairs - Number of Steps: 2    Bathroom Shower/Tub: Tub/Shower unit, Walk-in shower    Bathroom Equipment: Hand-held shower    ADL Assistance: Independent    Homemaking Assistance: Independent    Ambulation Assistance: Independent(No AD)    Transfer Assistance: Independent    Active : Yes    Additional Comments: Pt. reports she has fallen at home but not recently and not regularly. Pt. having word finding difficulty, but sons are present and clarify pt's answers     Social Determinants of Health     Financial Resource Strain: Low Risk     Difficulty of Paying Living Expenses: Not hard at all   Food Insecurity: No Food Insecurity    Worried About Running Out of Food in the Last Year: Never true    [address] in the Last Year: Never true   Transportation Needs:     Lack of Transportation (Medical):  Lack of Transportation (Non-Medical):    Physical Activity:     Days of Exercise per Week:     Minutes of Exercise per Session:    Stress:     Feeling of Stress :    Social Connections:     Frequency of Communication with Friends and Family:     Frequency of Social Gatherings with Friends and Family:     Attends [employer] Services:     Active Member of Clubs or Organizations:     Attends Club or Organization Meetings:     Marital Status:    Intimate Partner Violence:     Fear of Current or Ex-Partner:     Emotionally Abused:     Physically Abused:     Sexually Abused:          Review of Systems   Constitutional: Negative for chills, diaphoresis, fatigue and fever.    HENT: Negative for congestion, mouth sores, nosebleeds, postnasal drip, rhinorrhea, sinus pressure, sneezing, sore throat, trouble swallowing and voice change. Eyes: Negative for discharge, itching and visual disturbance. Respiratory: Positive for shortness of breath. Negative for cough, chest tightness and wheezing. Cardiovascular: Negative for chest pain, palpitations and leg swelling. Gastrointestinal: Negative for abdominal pain, diarrhea, nausea and vomiting. Genitourinary: Negative for difficulty urinating and hematuria. Musculoskeletal: Negative for arthralgias, joint swelling and myalgias. Skin: Negative for rash. Allergic/Immunologic: Negative for environmental allergies and food allergies. Neurological: Negative for dizziness, tremors, weakness and headaches. Psychiatric/Behavioral: Negative for behavioral problems and sleep disturbance.         :     Vitals:    06/21/21 1502   BP: 100/60   Pulse: 78   Temp: 97.5 °F (36.4 °C)   SpO2: 96%   Weight: 162 lb (73.5 kg)   Height: 5' 1\" (1.549 m)     Wt Readings from Last 3 Encounters:   06/21/21 162 lb (73.5 kg)   06/21/21 162 lb 3.2 oz (73.6 kg)   06/08/21 164 lb (74.4 kg)         Physical Exam  Constitutional:       General: She is not in acute distress. Appearance: She is well-developed. She is not diaphoretic. HENT:      Head: Normocephalic and atraumatic. Nose: Nose normal.   Eyes:      Pupils: Pupils are equal, round, and reactive to light. Neck:      Thyroid: No thyromegaly. Vascular: No JVD. Trachea: No tracheal deviation. Cardiovascular:      Rate and Rhythm: Normal rate and regular rhythm. Heart sounds: No murmur heard. No friction rub. No gallop. Pulmonary:      Effort: No respiratory distress. Breath sounds: No wheezing or rales. Comments: diminished Breath sound bilaterally. Chest:      Chest wall: No tenderness. Abdominal:      General: There is no distension. Tenderness: There is no abdominal tenderness. There is no rebound. Musculoskeletal:         General: Normal range of motion. Lymphadenopathy:      Cervical: No cervical adenopathy. Skin:     General: Skin is warm and dry. Neurological:      Mental Status: She is alert and oriented to person, place, and time. Coordination: Coordination normal.         Current Outpatient Medications   Medication Sig Dispense Refill    donepezil (ARICEPT) 5 MG tablet Take 1 tablet by mouth nightly 90 tablet 4    umeclidinium-vilanterol (ANORO ELLIPTA) 62.5-25 MCG/INH AEPB inhaler Inhale 1 puff into the lungs daily 90 puff 1    alendronate (FOSAMAX) 70 MG tablet Take 1 tablet by mouth every 7 days 12 tablet 4    amLODIPine (NORVASC) 5 MG tablet TAKE 1 TABLET BY MOUTH DAILY. 90 tablet 4    atorvastatin (LIPITOR) 20 MG tablet Take 1 tablet by mouth daily 90 tablet 4    escitalopram (LEXAPRO) 10 MG tablet Take 1 tablet by mouth daily 90 tablet 1    albuterol sulfate  (90 Base) MCG/ACT inhaler Inhale 2 puffs into the lungs every morning (before breakfast) 1 Inhaler 3    fluticasone (FLONASE) 50 MCG/ACT nasal spray 2 sprays by Nasal route daily 1 Bottle 5    calcium carbonate 648 MG TABS Take 1 tablet by mouth 2 times daily 180 tablet 4    OXYGEN Start oxygen therapy at 2 lit with activity and sleep , give POC for ambulation     Dx COPD 1 Units 0    aspirin 81 MG EC tablet Take 1 tablet by mouth daily 30 tablet 3    Multiple Vitamins-Minerals (PRESERVISION AREDS 2) CAPS Take 1 capsule by mouth daily 90 capsule 3    coenzyme Q10 100 MG CAPS capsule Take 100 mg by mouth daily      Ascorbic Acid (VITAMIN C) 500 MG tablet Take 500 mg by mouth daily.  vitamin D (CHOLECALCIFEROL) 1000 UNIT TABS tablet Take 1,000 Int'l Units by mouth daily. No current facility-administered medications for this visit.      Sandra Reagan MD     6/18/2021 11:32 AM                        [address]                 [address] [facility], [address] [facility]                      PULMONARY FUNCTION   Tea Blackwood   80 y.o.   female   Height 62 in   Weight 164 lb       Referring provider   Michelle Khan MD     Reading provider   Serafin Arzola MD     Test meets ATS criteria for acceptability and reproducibility Yes     Diagnosis: BYRNE: Yes  Cough   No, wheezing Yes   Smoking   quit 2 years ago     Spirometry   FVC            2.36 L   97%  Post bronchodilator 2.33 L  96%   Change -1 %   FEV1          1.33 L  74%   Post bronchodilator  1.32 L  73%     Change -1%   FEV1/FVC  56  %             Post bronchodilator  56 %   QYX29-28% 0.55 L  41%  Post bronchodilator  0.54 L  40%   Change   -2%     Lung volume   SVC           2.43 L  100%   RV              2.83 L 124%   TLC            5.26  L 110%   RV/TLC      53 %     DLCO           50 %     Test interpretation       Spirometry meets ATS criteria for mild obstructive airway disease   with no significant response to bronchodilator   Lung volume shows mild air trapping   Diffusion capacity is moderately reduced.         Clinical correlation is recommended     Serafin Arzola MD [facility], 6/18/2021 11:30 AM   Results for orders placed during the hospital encounter of 06/04/21    XR CHEST (2 VW)    Narrative  EXAMINATION: XR CHEST (2 VW)    CLINICAL HISTORY: HIP REPLACEMENT. PREOP. COMPARISONS: NOVEMBER 12, 2019    FINDINGS: Osseous structures are intact. Cardiopericardial silhouette is normal. Aorta calcified. Pulmonary vasculature is normal. Lungs are clear. Impression  NO ACUTE CARDIOPULMONARY DISEASE.  ]  Results for orders placed during the hospital encounter of 11/12/19    XR CHEST PORTABLE    Narrative  XR CHEST PORTABLE    Exam Date/Time:  11/12/2019 3:30 PM  Clinical History:   CVA  Comparison:  7/20/2014    RESULT:    Lines, tubes, and devices:  None. Lungs and pleura:  No consolidation. No pleural effusion. No pneumothorax. Normal pulmonary vascular pattern.     Cardiomediastinal silhouette:  Normal. Aortic atherosclerotic calcification. Other:  No acute osseous findings. Impression  No acute radiographic abnormality. Assessment/Plan:     1. Encounter for pre-operative respiratory clearance  She had CXR show no active disease, PFT show mild COPD , on 2 lit Spo2 96%, she is acceptable risk for left hip surgery. 2. Chronic obstructive pulmonary disease, unspecified COPD type ([address]  She is using bronchodilator with , Anoro Ellipta 1 puff daily, Albuterol HFA prn base. O2 and bronchodilator as before. 3. Hypoxia  She is on 2 lit O2  24 hour a day. Spo2 96% on RA    4. Obesity (BMI 30-39. 9)  She is advised try to lose weight. obesity related risk explained to the patient ,  Current weight:  162 lb (73.5 kg) Lbs. BMI:  Body mass index is 30.61 kg/m². Suggested weight control approaches, including dietary changes , exercise, behavioral modification. 5. Dyspnea on exertion  She having  Chronic shortness of breath which chronic , continue  Bronchodilator,O2 as before. keep  Spo2 90% or above. Return in about 4 months (around 10/21/2021) for COPD, hypoxia on O2.       Fleix Lang MD

## 2021-06-25 PROBLEM — Z96.642 STATUS POST TOTAL HIP REPLACEMENT, LEFT: Status: ACTIVE | Noted: 2021-01-01

## 2021-06-25 NOTE — ANESTHESIA POSTPROCEDURE EVALUATION
Department of Anesthesiology  Postprocedure Note    Patient: Keren Crew  MRN: [id number]  YOB: 1940  Date of evaluation: 6/25/2021  Time:  4:15 PM     Procedure Summary     Date: 06/25/21 Room / Location: [address]    Anesthesia Start: 1212 Anesthesia Stop:     Procedure: LEFT HIP DIRECT ANTERIOR HIP REPLACEMENT (Left ) Diagnosis: (OSTEOARTHRITIS LEFT HIP)    Surgeons: Concetta Vela MD Responsible Provider: Rebeca Casey MD    Anesthesia Type: MAC, regional, spinal ASA Status: 3          Anesthesia Type: No value filed. Daniel Phase I: Daniel Score: 10    Daniel Phase II:      Last vitals: Reviewed and per EMR flowsheets.        Anesthesia Post Evaluation    Patient location during evaluation: bedside  Patient participation: complete - patient participated  Level of consciousness: awake and awake and alert  Pain score: 0  Airway patency: patent  Nausea & Vomiting: no nausea and no vomiting  Complications: no  Cardiovascular status: blood pressure returned to baseline and hemodynamically stable  Respiratory status: acceptable  Hydration status: euvolemic

## 2021-06-25 NOTE — PROGRESS NOTES
Dr Mancilla Mount in to see patient. Left anterior hip dressing clean and dry. , ice on. Left pedal 2+. Moving legs and feet, Denies pain.

## 2021-06-25 NOTE — FLOWSHEET NOTE
[id number] Patient arrived from PACU. Patient A&O x 4, following all commands,  VSS on 3L NC, patent wears 3 NC at home, Patient denies pain, Left hip post op dressing clean, dry and intact. Patient's sons at bedside.

## 2021-06-25 NOTE — ANESTHESIA PROCEDURE NOTES
Spinal Block    Patient location during procedure: pre-op  Start time: 6/25/2021 11:52 AM  End time: 6/25/2021 11:57 AM  Reason for block: primary anesthetic  Staffing  Performed: anesthesiologist   Anesthesiologist: Preston Winn MD  Preanesthetic Checklist  Completed: patient identified, IV checked, site marked, risks and benefits discussed, surgical consent, monitors and equipment checked, pre-op evaluation, timeout performed, anesthesia consent given, oxygen available and patient being monitored  Spinal Block  Patient position: sitting  Prep: Betadine  Patient monitoring: cardiac monitor, continuous pulse ox and frequent blood pressure checks  Approach: midline  Location: L3/L4  Provider prep: mask and sterile gloves  Local infiltration: lidocaine  Agent: bupivacaine (hyperbaric bupivicain 0.75%)  Dose: 1.8  Dose: 1.8  Needle  Needle type: Pencan   Needle gauge: 24 G  Needle length: 3.5 in  Assessment  Sensory level: T6  Events: cerebrospinal fluid  Lysis level: CSF aspirated. CSF: clear  Attempts: 1  Hemodynamics: stable  Additional Notes  Free flowing CSF. Negative heme. Negative paresthesia. Ce Brill

## 2021-06-25 NOTE — PROGRESS NOTES
Received from or into pacu in a bed accompanied in by Venita Parikh crna. O2 on at 6L mask, SAO2 99%,. Breath sounds clear to anteior auscultation. MP RSR. Left anterior hip aquacel dressing clean dry and intact, Left toes warm with brisk capillary refill, left pedal pulse 2+. Pt denies pain at this time. Sensation level at L1, moving feet.

## 2021-06-25 NOTE — PROGRESS NOTES
Jaime Frank was ordered the oral bisphosphonate, FOSAMAX. Oral Bisphosphonates have an increased risk of serious GI events (esophagitis, dysphagia, esophageal ulcers, esophageal erosions, and esophageal stricture) if the strict dosing instructions are not followed. Per the FDA labeling, oral bisphosphonates must be taken at least 30 min (60 min for ibandronate) before the first food, beverage or medication of the day. Patients MUST also avoid lying down for at least 30 min (60 minutes for ibandronate) after taking the oral bisphosphonate. Because these strict dosage instructions are difficult to follow in many hospitalized patients, orders for oral bisphosphonate therapy will be discontinued (including orders for a patient to take his/her home medication) per the [address] [place] [address]. Consider risk versus benefits when resuming the oral bisphosphonate at discharge.

## 2021-06-25 NOTE — ANESTHESIA PROCEDURE NOTES
Peripheral Block    Patient location during procedure: pre-op  Start time: 6/25/2021 11:03 AM  End time: 6/25/2021 11:08 AM  Staffing  Performed: anesthesiologist   Anesthesiologist: Norma Browne MD  Preanesthetic Checklist  Completed: patient identified, IV checked, site marked, risks and benefits discussed, surgical consent, monitors and equipment checked, pre-op evaluation, timeout performed, anesthesia consent given, oxygen available and patient being monitored  Peripheral Block  Patient position: supine  Prep: ChloraPrep  Patient monitoring: cardiac monitor, continuous pulse ox, frequent blood pressure checks and IV access  Block type: PENG  Laterality: left  Injection technique: single-shot  Guidance: ultrasound guided  Local infiltration: bupivacaine  Infiltration strength: 0.25 %  Dose: 30 mL  Provider prep: mask and sterile gloves (Sterile probe cover)  Local infiltration: bupivacaine  Needle  Needle type: combined needle/nerve stimulator   Needle gauge: 21 G  Needle length: 10 cm  Needle localization: anatomical landmarks and ultrasound guidance  Assessment  Injection assessment: negative aspiration for heme, no paresthesia on injection and local visualized surrounding nerve on ultrasound  Paresthesia pain: immediately resolved  Slow fractionated injection: yes  Hemodynamics: stable  Additional Notes  Ultrasound image printed and saved in patient chart.     Sterile probe cover used    Reason for block: post-op pain management and at surgeon's request

## 2021-06-25 NOTE — ANESTHESIA PRE PROCEDURE
Department of Anesthesiology  Preprocedure Note       Name:  Reuben Bauer   Age:  80 y.o.  :  1940                                          MRN:  [id number]         Date:  2021      Surgeon: Nam Gonzalez):  Citlali Reynoso MD    Procedure: Procedure(s):  LEFT HIP DIRECT ANTERIOR HIP REPLACEMENT. (PAT AT [place] [facility]. REQUESTING 9:00AM. Karena MAE    Medications prior to admission:   Prior to Admission medications    Medication Sig Start Date End Date Taking? Authorizing Provider   donepezil (ARICEPT) 5 MG tablet Take 1 tablet by mouth nightly 21 Yes Aguila Grace MD   umeclidinium-vilanterol (ANORO ELLIPTA) 62.5-25 MCG/INH AEPB inhaler Inhale 1 puff into the lungs daily 21  Yes Loreta Moctezuma MD   alendronate (FOSAMAX) 70 MG tablet Take 1 tablet by mouth every 7 days 21  Yes Sarah Zhu MD   amLODIPine (NORVASC) 5 MG tablet TAKE 1 TABLET BY MOUTH DAILY.  21  Yes Sarah Zhu MD   atorvastatin (LIPITOR) 20 MG tablet Take 1 tablet by mouth daily 21  Yes Sarah Zhu MD   escitalopram (LEXAPRO) 10 MG tablet Take 1 tablet by mouth daily 21  Yes Sarah Zhu MD   fluticasone [facility]) 50 MCG/ACT nasal spray 2 sprays by Nasal route daily 21  Yes Loreta Moctezuma MD   OXYGEN Start oxygen therapy at 2 lit with activity and sleep , give POC for ambulation     Dx COPD 20  Yes Loreta Moctezuma MD   albuterol sulfate  (90 Base) MCG/ACT inhaler Inhale 2 puffs into the lungs every morning (before breakfast) 21   Loreta Moctezuma MD   calcium carbonate 648 MG TABS Take 1 tablet by mouth 2 times daily 20  Sarah Zhu MD   aspirin 81 MG EC tablet Take 1 tablet by mouth daily 19   Remy Brito, DO   Multiple Vitamins-Minerals (PRESERVISION AREDS 2) CAPS Take 1 capsule by mouth daily 19   Sarah Zhu MD   coenzyme Q10 100 MG CAPS capsule Take Date    EYE SURGERY  2010    PER PROBLEM SHEET    HYSTERECTOMY  1975    PER PROBLEM SHEET    LITHOTRIPSY  2004    PER PROBLEM SHEET    TOTAL HIP ARTHROPLASTY Right 2019    VEIN SURGERY  1988    PER PROBLEM SHEET       Social History:    Social History     Tobacco Use    Smoking status: Former Smoker     Packs/day: 0.75     Years: 45.00     Pack years: 33.75     Types: Cigarettes     Start date:      Quit date: 2019     Years since quittin.3    Smokeless tobacco: Never Used   Substance Use Topics    Alcohol use: No     Comment: social                                Counseling given: Not Answered      Vital Signs (Current):   Vitals:    21 0834   BP: (!) 141/73   Pulse: 74   Resp: 16   Temp: 97.6 °F (36.4 °C)   TempSrc: Temporal   SpO2: 98%   Weight: 162 lb (73.5 kg)   Height: 5' (1.524 m)                                              BP Readings from Last 3 Encounters:   21 (!) 141/73   21 100/60   21 120/86       NPO Status: Time of last liquid consumption:                         Time of last solid consumption:                         Date of last liquid consumption: 21                        Date of last solid food consumption: 21    BMI:   Wt Readings from Last 3 Encounters:   21 162 lb (73.5 kg)   21 162 lb (73.5 kg)   21 162 lb 3.2 oz (73.6 kg)     Body mass index is 31.64 kg/m².     CBC:   Lab Results   Component Value Date    WBC 6.9 2021    RBC 4.05 2021    RBC 4.30 2012    HGB 12.4 2021    HCT 38.3 2021    MCV 94.5 2021    RDW 16.1 2021     2021       CMP:   Lab Results   Component Value Date     2021    K 4.3 2021    K 3.6 2019     2021    CO2 24 2021    BUN 22 2021    CREATININE 0.88 2021    GFRAA >60.0 2021    LABGLOM >60.0 2021    GLUCOSE 93 2021    GLUCOSE 83 2012 PROT 7.1 11/12/2019    CALCIUM 9.6 06/21/2021    BILITOT 0.3 11/12/2019    ALKPHOS 88 11/12/2019    AST 16 11/12/2019    ALT 10 11/12/2019       POC Tests: No results for input(s): POCGLU, POCNA, POCK, POCCL, POCBUN, POCHEMO, POCHCT in the last 72 hours. Coags:   Lab Results   Component Value Date    PROTIME 13.0 11/12/2019    INR 0.9 11/12/2019    APTT 33.6 11/12/2019       HCG (If Applicable): No results found for: PREGTESTUR, PREGSERUM, HCG, HCGQUANT     ABGs: No results found for: PHART, PO2ART, VUA0XGU, WFZ9THH, BEART, J3NNQMIH     Type & Screen (If Applicable):  No results found for: LABABO, LABRH    Drug/Infectious Status (If Applicable):  No results found for: HIV, HEPCAB    COVID-19 Screening (If Applicable): No results found for: COVID19        Anesthesia Evaluation    Airway: Mallampati: II  TM distance: >3 FB   Neck ROM: full  Mouth opening: > = 3 FB Dental:    (+) upper dentures and lower dentures      Pulmonary: breath sounds clear to auscultation  (+) COPD:                            ROS comment: On Oxygen at home   Cardiovascular:    (+) hypertension:, hyperlipidemia      ECG reviewed  Rhythm: regular                      Neuro/Psych:   (+) CVA: no interval change, psychiatric history:            GI/Hepatic/Renal: Neg GI/Hepatic/Renal ROS            Endo/Other: Negative Endo/Other ROS                    Abdominal:             Vascular: negative vascular ROS. Other Findings:             Anesthesia Plan      MAC, regional and spinal     ASA 3     (US guided PENG block  GA backup)  Induction: intravenous. MIPS: Prophylactic antiemetics administered. Anesthetic plan and risks discussed with patient. Use of blood products discussed with patient whom consented to blood products. Plan discussed with CRNA.     Attending anesthesiologist reviewed and agrees with Preprocedure content              Norma Browne MD   6/25/2021

## 2021-06-25 NOTE — CONSULTS
Hospital Medicine  Consult  Patient:  Carlee Barillas  MRN: [id number]    CHIEF COMPLAINT:  No chief complaint on file. History Obtained From:  Patient, EMR  Primary Care Physician: Antonio Hsu MD    HISTORY OF PRESENT ILLNESS:   The patient is a 80 y.o. female with PMHx of COPD, HTN, IBS, tobacco use, vascular dementia who we were consulted for medical coomanagement. Patient is doing well post op; denies fevers, chills, sweats, CP, SOB, diarrhea or burning micturition. Past Medical History:      Diagnosis Date    COPD (chronic obstructive pulmonary disease) (HCC)     Dyspnea on exertion 7/31/2019    Hypertension     Impaired cognition     Irritable bowel syndrome without diarrhea 12/22/2020    Mild cognitive impairment with memory loss 9/25/2020    Osteoarthritis     Tobacco abuse 5/6/2019    Tobacco use disorder 5/6/2019    Vascular dementia ([facility] [address] 6/22/2020     Past Surgical History:      Procedure Laterality Date    EYE SURGERY  01/01/2010    PER PROBLEM SHEET    HYSTERECTOMY  01/01/1975    PER PROBLEM SHEET    LITHOTRIPSY  01/01/2004    PER PROBLEM SHEET    TOTAL HIP ARTHROPLASTY Right 2019    VEIN SURGERY  01/01/1988    PER PROBLEM SHEET     Medications Prior to Admission:    Prior to Admission medications    Medication Sig Start Date End Date Taking? Authorizing Provider   donepezil (ARICEPT) 5 MG tablet Take 1 tablet by mouth nightly 6/8/21 7/8/21 Yes Aguila Estrella MD   umeclidinium-vilanterol (ANORO ELLIPTA) 62.5-25 MCG/INH AEPB inhaler Inhale 1 puff into the lungs daily 5/26/21  Yes Felix Lang MD   alendronate (FOSAMAX) 70 MG tablet Take 1 tablet by mouth every 7 days 2/24/21  Yes Marcos Hayes MD   amLODIPine (NORVASC) 5 MG tablet TAKE 1 TABLET BY MOUTH DAILY.  2/24/21  Yes Marcos Hayes MD   atorvastatin (LIPITOR) 20 MG tablet Take 1 tablet by mouth daily 2/24/21  Yes Marcos Hayes MD   escitalopram (LEXAPRO) 10 MG tablet Take 1 tablet by mouth daily 2/24/21  Yes Ketty Lambert MD   fluticasone Allerashi Bette) 50 MCG/ACT nasal spray 2 sprays by Nasal route daily 1/9/21  Yes Christiano Trinh MD   OXYGEN Start oxygen therapy at 2 lit with activity and sleep , give POC for ambulation     Dx COPD 1/21/20  Yes Christiano Trinh MD   albuterol sulfate  (90 Base) MCG/ACT inhaler Inhale 2 puffs into the lungs every morning (before breakfast) 1/9/21   Christiano Trinh MD   calcium carbonate 648 MG TABS Take 1 tablet by mouth 2 times daily 12/22/20 12/22/21  Ketty Lambert MD   aspirin 81 MG EC tablet Take 1 tablet by mouth daily 11/14/19   Franklin Contras, DO   Multiple Vitamins-Minerals (PRESERVISION AREDS 2) CAPS Take 1 capsule by mouth daily 5/6/19   Ketty Lambert MD   coenzyme Q10 100 MG CAPS capsule Take 100 mg by mouth daily    Historical Provider, MD   Ascorbic Acid (VITAMIN C) 500 MG tablet Take 500 mg by mouth daily. Historical Provider, MD   vitamin D (CHOLECALCIFEROL) 1000 UNIT TABS tablet Take 1,000 Int'l Units by mouth daily. Historical Provider, MD     Allergies:  Patient has no known allergies. Social History:   TOBACCO:   reports that she quit smoking about 2 years ago. Her smoking use included cigarettes. She started smoking about 54 years ago. She has a 33.75 pack-year smoking history. She has never used smokeless tobacco.  ETOH:   reports no history of alcohol use.     Family History:       Problem Relation Age of Onset    Diabetes Other         GRANDMOTHER     REVIEW OF SYSTEMS:  Ten systems reviewed and negative except for stated in HPI    Physical Exam:    Vitals: /64   Pulse 80   Temp 98.1 °F (36.7 °C)   Resp 16   Ht 5' (1.524 m)   Wt 162 lb (73.5 kg)   LMP  (LMP Unknown)   SpO2 94%   BMI 31.64 kg/m²   General appearance: alert, appears stated age and cooperative  Skin: No rashes or lesions on exposed skin  HEENT: Head: Normal, normocephalic, atraumatic. Bettey Grad Neck: supple, symmetrical, trachea midline  Lungs: clear to auscultation bilaterally  Heart: regular rate and rhythm  Abdomen: soft, non-tender; bowel sounds normal; no masses,  no organomegaly  Extremities: extremities normal, atraumatic, no cyanosis or edema  Neurologic: Non focal    No results for input(s): WBC, HGB, PLT in the last 72 hours. No results for input(s): NA, K, CL, CO2, BUN, CREATININE, GLUCOSE, AST, ALT, ALB, BILITOT, ALKPHOS in the last 72 hours. Troponin T: No results for input(s): TROPONINI in the last 72 hours. ABGs: No results found for: PHART, PO2ART, PNS0PMI  INR: No results for input(s): INR in the last 72 hours. URINALYSIS:No results for input(s): NITRITE, COLORU, PHUR, LABCAST, WBCUA, RBCUA, MUCUS, TRICHOMONAS, YEAST, BACTERIA, CLARITYU, SPECGRAV, LEUKOCYTESUR, UROBILINOGEN, BILIRUBINUR, BLOODU, GLUCOSEU, AMORPHOUS in the last 72 hours. Invalid input(s): Rosi Clear  -----------------------------------------------------------------   XR PELVIS (1-2 VIEWS)    Result Date: 6/25/2021  EXAM: XR PELVIS (1-2 VIEWS) HISTORY: Postoperative evaluation total hip arthroplasty TECHNIQUE: Frontal view of the pelvis COMPARISON: Radiographs from December 24, 2019 FINDINGS: Postsurgical changes of total hip arthroplasty including soft tissue emphysema. Alignment is anatomic. No periprosthetic abnormality. Postsurgical changes of right total hip arthroplasty also identified. Postsurgical changes of left total hip arthroplasty. Fluoro For Surgical Procedures    Result Date: 6/25/2021  FLUORO FOR SURGICAL PROCEDURES : 6/25/2021 CLINICAL HISTORY: R52 Pain ICD10. COMPARISON: Pelvis and bilateral hip radiographs 12/24/2019. Intraoperative fluoroscopy was provided for Dr. Kurtis Pablo total left hip arthroplasty placement procedure. A total of 11.65 mGy of fluoroscopy was used, with 3 fluoroscopic stills saved. No diagnostic images were obtained.  Please see Dr. Kurtis Pablo surgical notes for completeness. Assessment and Plan   1. Post op care:  Per primary team  2. 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 9) E66.9    Ataxia R27.0    Status post total hip replacement, left G27.810       Lavell Alcazar MD, MD  Consultant Hospitalist    Emergency Contact:

## 2021-06-25 NOTE — OP NOTE
Department of Orthopaedic Surgery  Operative Note      Patient: Bhavya Bey  YOB: 1940  MRN: [id number]    Date of Procedure: 6/25/2021    Pre-Op Diagnosis: OSTEOARTHRITIS LEFT HIP    Post-Op Diagnosis: Same       Procedure(s):  LEFT HIP DIRECT ANTERIOR HIP REPLACEMENT    Surgeon(s):  Barb Gonzalez MD    Assistant:  First Assistant: Rumalda Cabot    Anesthesia: Spinal    Estimated Blood Loss (mL): 115     Complications: None    Urine output: see anesthesia record    Specimens:   * No specimens in log *    Implants:  Implant Name Type Inv. Item Serial No.  Lot No. LRB No. Used Action   SHELL ACET SZ E [id number] 3 H OSSEOTI LIMIT H 2 MOBILITY G7  SHELL ACET SZ E [id number] 3 H OSSEOTI LIMIT H 2 MOBILITY G7  [facility] [id number] Left 1 Implanted   LINER ACET SZ E [id number] HIP ARCOMXL NEUT G7  LINER ACET SZ E [id number] HIP ARCOMXL NEUT G7  [employer]- [id number] Left 1 Implanted   STEM FEM SZ 12 L109MM 133DEG HIP PPS STD OFFSET TYP 1 TAPR  STEM FEM SZ 12 L109MM 133DEG HIP PPS STD OFFSET TYP 1 TAPR  [employer]- [id number] Left 1 Implanted   HEAD FEM [id number] +0MM STD OFFSET CO CHROM HIP TYP 1 TAPR  HEAD FEM [id number] +0MM STD OFFSET CO CHROM HIP TYP 1 TAPR  [employer]- [id number] Left 1 Implanted         Drains: * No LDAs found *    Findings: see below    Other: Tranexamic acid PO 1300 mg preop    BRIEF HISTORY: Patient is a 80 y.o. female well-known to my practice with severe, progressively worsening left hip pain that xrays revealed was due to severe hip osteoarthritis with minimal superior acetabular wear. Given persistent pain and disability I recommended left/right: left total hip replacement.  We had a detailed discussion of the risks and benefits and convalescence of the procedure including, but not limited to bleeding, infection, nerve damage, blood vessel damage, DVT, PE, wound healing issues, dislocation, need for additional surgery, need for revision surgery, hardware irritation, adverse metal reaction, anesthetic complications, continued pain, limp, complex regional pain syndrome, numbness, tingling, leg-length discrepancy, position-related pain, pain related to the intraoperative boots, anesthetic complications, cardiopulmonary complications, other unforeseen complications, even death. The patient expressed understanding of these risks and elected to proceed. All questions were answered. Left hip was marked and written consent obtained. We obtained preop clearances and he was cleared by anesthesia to go forward with surgery. We had the patient obtain preoperative [facility]ine [facility] planning. OPERATIVE COURSE: The patient was taken to the operative theater and transferred to the [facility] table in smooth and stable fashion with all bony prominences padded and the arms held in abduction. Lower extremities were protected with webril and the [place] boots applied snugly but allowing good capillary refill. A time-out was performed during the confirming the patient's name, medical record number, site of surgery, available implants in the room, allergies, antibiotics and consent. The patient was placed under Spinal anesthesia without issue. An initial fluoroscopic imaging was taken to confirm appropriate pelvic alignment and gauge leg lengths. We confirmed our prior estimates. The operative hip was prepped and draped in the usual sterile fashion and using ASIS as a reference, a longitudinal incision in line with this tensor fascia fibers was made approximately 2-3 fingerbreadths lateral and distal to the ASIS extending approximately 10 cm in length. Using a 10 blade, once through the skin, hemostasis was achieved with Bovie cautery and then Alas elevator was used to identify the tensor fascia.   A new 10 blade was then used to incise the tensor fascia and then the tensor fascia muscle was anterior acetabular rim perpendicular to inguinal ligament and then finally we used a sharp Hohmann and placed this behind the transverse acetabular ligament, which was then released inferiorly providing excellent view of the acetabulum. The Bovie was used to debride any soft tissues from the cotyloid notch and we then had excellent view of the medial wall. We then began sequentially reaming under fluoroscopic guidance starting with the 46 mm reamer and medialized and then expanded so that the inferior edge of the acetabulum was at the level of the tear drop. We were able to ream up to 51 mm reamer, planning for a 52 mm final implant. We selected a 3 hole G7 Osseoti cup and placed this without difficulty in approximately 37 degrees of abduction and 15 degrees of anteversion with no anterior rim overhang. We had excellent purchase and no screws were needed. We then placed a neutral Vit E liner for the 36 mm head. We confirmed this to be flush around the periphery and cup articulation. We removed anterior and inferior osteophytes with small osteotome. Prior to this, we removed the acetabular labrum before cup insertion and reaming. We had excellent bleeding bone and down to the medial wall. We then turned our attention towards the femur. Retractors were removed and the femoral hook was placed with femur 0 degrees and all traction was taken off both extremities. The leg was then externally rotated to 120 degrees and the hook was placed in the slot correspondingly. The leg was dropped group home and the Innomed retractor was placed between the superior capsule minimus and then a fresh 10 blade was used to release the capsule from the minimus and amputate this capsule off the medial aspect of the greater trochanter. We then inserted the Innomed retractor to protect the minimus over the saddle of the greater trochanter.   Then, using the sutures placed in the superior leaflet, we released the soft tissues and capsule to the 12 o'clock position to complete our femoral release. We also ensured release of the inferior capsule one more time. We then dropped the leg all the way to the floor and adducted the leg with all traction off. We then used the elevator as a shelf to bring the proximal femur into ideal position preparing the canal.  We then used a combination of rongeur,  and suction tip as well as rasp to identify our canal.  After defining excellent posterior and lateral starting point, we sequentially broached up to a #12 femur, irrigated and then trialed with 12 standard. Leg lengths and offset were as planned and equal to the contralateral side. We then calcar planed and removed the trial femur and irrigated then impacted the final implant. The implant sat flush with the neck cut, which is where the trial sat. We then secured the 36 mm cobalt chrome head and impacted this appropriately. We made sure to completely dry the trunion prior. We checked the leg lengths one more time as well as the offset using the guidewire under fluoro and we had excellent restoration of leg length and offset. With ER to 90 degrees there was no impingement or instability. We also performed AP hip with internal rotation and confirmed there was no fracture and the component was fitting well. I palpated head and cup during each dislocation and relocation also using the bone hook. At this point, the wound was copiously irrigated one more time with 2.5 L of normal saline with bacitracin. Hemostasis achieved with Bovie. We then applied 100 mL of orthopaedic pain cocktail to the deep and superficial soft tissues and repaired the capsule using a #1 Vicryl.   We then closed the tensor fascia and overlying Hudson's fascia with a 0 Stratafix PDS and then irrigated the remainder of the 500 cc normal saline and closed the remainder of the incision with #2-0 Vicryl in interrupted fashion and the deep dermal layer and 3-0 stratafix

## 2021-06-26 NOTE — PROGRESS NOTES
Progress Note  Date:2021       Room:N224/N224-01  Patient Renata Oliva     YOB: 1940     Age:81 y.o. Subjective    Subjective:  Symptoms:  No shortness of breath, malaise, cough, chest pain, weakness, headache, chest pressure, anorexia, diarrhea or anxiety. Diet:  No nausea or vomiting. Review of Systems   Respiratory: Negative for cough and shortness of breath. Cardiovascular: Negative for chest pain. Gastrointestinal: Negative for anorexia, diarrhea, nausea and vomiting. Neurological: Negative for weakness. Objective         Vitals Last 24 Hours:  TEMPERATURE:  Temp  Av.9 °F (36.6 °C)  Min: 97.5 °F (36.4 °C)  Max: 98.1 °F (36.7 °C)  RESPIRATIONS RANGE: Resp  Av.3  Min: 0  Max: 18  PULSE OXIMETRY RANGE: SpO2  Av %  Min: 82 %  Max: 100 %  PULSE RANGE: Pulse  Av.1  Min: 69  Max: 82  BLOOD PRESSURE RANGE: Systolic (85OIL), OAS:989 , Min:78 , KFN:205   ; Diastolic (72MVQ), YJE:21, Min:46, Max:86    I/O (24Hr): Intake/Output Summary (Last 24 hours) at 2021 0951  Last data filed at 2021 0539  Gross per 24 hour   Intake 2678.33 ml   Output    Net 2678.33 ml     Objective:  General Appearance:  Comfortable, in no acute distress and well-appearing. Vital signs: (most recent): Blood pressure 110/67, pulse 73, temperature 97.9 °F (36.6 °C), temperature source Oral, resp. rate 14, height 5' (1.524 m), weight 162 lb (73.5 kg), SpO2 96 %, not currently breastfeeding. HEENT: Normal HEENT exam.    Lungs:  Normal effort. Heart: Normal rate. S1 normal and S2 normal.    Abdomen: Abdomen is soft. There is no epigastric area or suprapubic area tenderness. Pulses: Distal pulses are intact. Neurological: Patient is alert and oriented to person, place and time. Pupils:  Pupils are equal, round, and reactive to light. Skin:  Warm and dry.       Labs/Imaging/Diagnostics    Labs:  CBC:  Recent Labs     21  0612   WBC 12.1* RBC 2.88*   HGB 8.8*   HCT 26.5*   MCV 92.1   RDW 15.5*        CHEMISTRIES:  Recent Labs     06/26/21  0612      K 4.3      CO2 25   BUN 27*   CREATININE 1.03*   GLUCOSE 133*     PT/INR:No results for input(s): PROTIME, INR in the last 72 hours. APTT:No results for input(s): APTT in the last 72 hours. LIVER PROFILE:No results for input(s): AST, ALT, BILIDIR, BILITOT, ALKPHOS in the last 72 hours. Imaging Last 24 Hours:  XR PELVIS (1-2 VIEWS)    Result Date: 6/25/2021  EXAM: XR PELVIS (1-2 VIEWS) HISTORY: Postoperative evaluation total hip arthroplasty TECHNIQUE: Frontal view of the pelvis COMPARISON: Radiographs from December 24, 2019 FINDINGS: Postsurgical changes of total hip arthroplasty including soft tissue emphysema. Alignment is anatomic. No periprosthetic abnormality. Postsurgical changes of right total hip arthroplasty also identified. Postsurgical changes of left total hip arthroplasty. Fluoro For Surgical Procedures    Result Date: 6/25/2021  FLUORO FOR SURGICAL PROCEDURES : 6/25/2021 CLINICAL HISTORY: R52 Pain ICD10. COMPARISON: Pelvis and bilateral hip radiographs 12/24/2019. Intraoperative fluoroscopy was provided for Dr. Juliet Darling total left hip arthroplasty placement procedure. A total of 11.65 mGy of fluoroscopy was used, with 3 fluoroscopic stills saved. No diagnostic images were obtained. Please see Dr. Juliet Darling surgical notes for completeness. Assessment//Plan           Hospital Problems         Last Modified POA    Status post total hip replacement, left 6/25/2021 Yes        Assessment & Plan  6/26: Patient doing better. Denies any needs. Does not smoke cigarettes currently. But uses oxygen at home. Patient has chronic respiratory failure. Dementia without respiratory symptoms. Spoke with the son at bedside about the care.   Electronically signed by Amrik Su MD on 6/26/21 at 9:51 AM EDT

## 2021-06-26 NOTE — CARE COORDINATION
[facility] [place] Case Management Initial Discharge Assessment    Met with patient and her son to discuss patient's baseline status, available resources, and discharge plan. PCP: Lianne Bradford MD                                  Date of Last Visit: 6/21/2021  If no PCP, list provided? N/A    Discharge Planning    Living Arrangements: Patient lives independently at home with bed/bath on first floor. Who do you live with? Son and daughter-in-law (patient has her own suite in her son's home). Who helps you with your care: Patient is independent at baseline but reports she has multiple family members who can assist with anything she needs. If lives at home: Do you have any barriers navigating in your home? Not at baseline. Patient can perform ADL? Yes    Current Services (outpatient and in home):  None - Patient is planning to have [facility] PT/OT and then transition to outpatient therapy. Dialysis: No    Is transportation available to get to your appointments? Yes - Family can transport. DME Equipment: Patient has all needed equipment as she had a previous hip replacement and CVA. Equipment available includes walker, shower seat, raised toilet seat, grab bars. Respiratory equipment: Continuous Oxygen  3 Liters      Pharmacy: [facility] [address] + mail order [facility] for long-term meds. Consult with Medication Assistance Program?  No      Patient agreeable to [facility]? Yes, [address]. Patient agreeable to SNF/Rehab? N/A - SNF level of care not needed per patient and family. Other discharge needs identified? Other - NA, patient states she has everything she needs. Does Patient Have a High-Risk for Readmission Diagnosis (CHF, PN, MI, COPD)? Yes     History: COPD on home O2, CVA, htn, mac degen, dementia. Initial Discharge Plan? (Note: please see concurrent daily documentation for any updates after initial note).   Home with family and [facility], has all needed equipment.     Readmission Risk              Risk of Unplanned Readmission:  0         Electronically signed by Yas Schumacher RN on 6/26/2021 at 9:51 AM

## 2021-06-26 NOTE — PROGRESS NOTES
[facility] OCCUPATIONAL THERAPY EVALUATION - ACUTE     NAME: Dwain Garcia  : 1940 (80 y.o.)  MRN: [id number]  CODE STATUS: Full Code  Room: 24/N224-01    Date of Service: 2021    Patient Diagnosis(es): Status post total hip replacement, left [Z96.642]   No chief complaint on file.     Patient Active Problem List    Diagnosis Date Noted    Status post total hip replacement, left 2021    Ataxia 2021    Obesity (BMI 30-39.9) 2021    Irritable bowel syndrome without diarrhea 2020    Vascular dementia ([address] 2020    Anxiety 2020    Hypoxia 2020    Arthropathy of both hips 2019    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    Cerebrovascular accident (CVA) due to stenosis of left anterior cerebral artery ([address] 2019    Posterior rhinorrhea 2019    Dyspnea on exertion 2019    Essential (primary) hypertension 10/07/2013    Chronic obstructive pulmonary disease (HCC)     Osteoarthritis     Age-related cataract 2011    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    Other intraretinal microvascular abnormalities 10/12/2010        Past Medical History:   Diagnosis Date    COPD (chronic obstructive pulmonary disease) (HCC)     Dyspnea on exertion 2019    Hypertension     Impaired cognition     Irritable bowel syndrome without diarrhea 2020    Mild cognitive impairment with memory loss 2020    Osteoarthritis     Tobacco abuse 2019    Tobacco use disorder 2019    Vascular dementia ([address] 2020     Past Surgical History:   Procedure Laterality Date    EYE SURGERY  2010    PER PROBLEM SHEET    HYSTERECTOMY  1975    PER PROBLEM SHEET    LITHOTRIPSY  2004    PER PROBLEM SHEET    TOTAL HIP ARTHROPLASTY Right 2019    VEIN SURGERY  1988    PER PROBLEM SHEET        Restrictions  Position Activity Restriction  Other position/activity restrictions: anterior hip Exceptions: Wears glasses for reading  Hearing  Hearing: Exceptions to [facility]  Hearing Exceptions: Hard of hearing/hearing concerns     ROM:   LUE AROM (degrees)  LUE AROM : WFL  RUE AROM (degrees)  RUE AROM : WFL    Strength:  LUE Strength  Gross LUE Strength: Exceptions to [facility]  LUE Strength Comment: grossly 3+/5  RUE Strength  Gross RUE Strength: Exceptions to [facility]  RUE Strength Comment: grossly 3+/5    Coordination, Tone, Quality of Movement:    Tone RUE  RUE Tone: Normotonic  Tone LUE  LUE Tone: Normotonic    Hand Dominance:  Hand Dominance  Hand Dominance: Right    ADL Status:  ADL  Feeding: Setup  Grooming: Setup  UE Bathing: Setup  LE Bathing: Contact guard assistance  UE Dressing: Setup  LE Dressing: Contact guard assistance  Toileting: Contact guard assistance  Additional Comments: cues for problem solving and sequencing  Toilet Transfers  Toilet - Technique: Ambulating  Equipment Used: Grab bars  Toilet Transfer: Contact guard assistance  Tub Transfers  Tub Transfers: Not tested    Therapy key for assistance levels    Independent = Pt. is able to perform task with no assistance but may require a device   Stand by assistance = Pt. does not perform task at an independent level but does not need physical assistance, requires verbal cues  Minimal, Moderate, Maximal Assistance = Pt. requires physical assistance (25%, 50%, 75% assist from helper) for task but is able to actively participate in task   Dependent = Pt. requires total assistance with task and is not able to actively participate with task completion     Functional Mobility:  Functional Mobility  Functional - Mobility Device: Rolling Walker  Activity: Other  Assist Level: Minimal assistance  Functional Mobility Comments: LOB with ambulation for bed to commode transfer (5ft), ataxia  Transfers  Sit to stand: Contact guard assistance  Stand to sit: Contact guard assistance  Transfer Comments: cues for hand placement for safety    Bed Mobility  Bed mobility  Supine to Sit: Supervision  Sit to Supine: Unable to assess (left in chair)  Scooting: Unable to assess    Seated and Standing Balance:  Balance  Sitting Balance: Supervision  Standing Balance: Contact guard assistance    Functional Endurance:  Activity Tolerance  Activity Tolerance: Patient Tolerated treatment well    D/C Recommendations:  OT D/C RECOMMENDATIONS  REQUIRES OT FOLLOW UP: Yes    Equipment Recommendations:  OT Equipment Recommendations  Other: continue to assess    OT Education:   OT Education  OT Education: OT Role, Plan of Care, Precautions, Transfer Training  Barriers to Learning: cognition    OT Follow Up:  OT D/C RECOMMENDATIONS  REQUIRES OT FOLLOW UP: Yes       Assessment/Discharge Disposition:  Assessment: Pt is a 81 y/o female, recently admitted to [facility] s/p [address]. Pt lives with son and DIL in a 1 story home with basement and 2 steps to enter.  Pt reported previous independence with ADLs, IADLs, and functional transfers and presents with the above deficits and may benefit from skilled OT intervention for increased independence and safety upon d/c to home  Performance deficits / Impairments: Decreased functional mobility , Decreased ADL status, Decreased strength, Decreased high-level IADLs, Decreased endurance, Decreased cognition, Decreased safe awareness, Decreased balance, Decreased coordination  Prognosis: Good  Discharge Recommendations: IP Rehab, Home with Home health OT  Decision Making: Medium Complexity  History: multi comorbidities  Exam: 9 deficits  Assistance / Modification: MIN A    Six Click Score   How much help for putting on and taking off regular lower body clothing?: A Little  How much help for Bathing?: A Little  How much help for Toileting?: A Little  How much help for putting on and taking off regular upper body clothing?: None  How much help for taking care of personal grooming?: None  How much help for eating meals?: None  AM-PAC Inpatient Daily Activity Raw Score: 21  AM-PAC Inpatient ADL T-Scale Score : 44.27  ADL Inpatient CMS 0-100% Score: 32.79    Plan:  Plan  Times per week: 1-3x/week  Times per day: Daily  Plan weeks: duration of acute stay  Current Treatment Recommendations: Strengthening, Endurance Training, Patient/Caregiver Education & Training, Self-Care / ADL, Equipment Evaluation, Education, & procurement, Functional Mobility Training, Safety Education & Training    Goals:   Patient will:    - Improve functional endurance to tolerate/complete 30 mins of ADL's  - Be SUP  in UB ADLs   - Be SUP in LB ADLs  - Be SUP in ADL transfers without LOB  - Be SUP in toileting tasks    Patient Goal: Patient goals :  To get stronger and get home     Discussed and agreed upon: Yes Comments:     Therapy Time:   OT Individual Minutes  Time In: 2945  Time Out: 0691  Minutes: 24    Eval: 24 minutes     Electronically signed by:    Manju Montanez OTR/CHANDRA, OTR/L  6/26/2021, 9:29 AM

## 2021-06-26 NOTE — DISCHARGE INSTR - COC
Continuity of Care Form    Patient Name: Ngoc Edmondson   :  1940  MRN:  [id number]    Admit date:  2021  Discharge date:  2021    Code Status Order: Full Code   Advance Directives:   [facility] Documentation     Date/Time Healthcare Directive Type of Healthcare Directive Copy in [address] Agent's Name Healthcare Agent's Phone Number    21 0286  Yes, patient has an advance directive for healthcare treatment --  Yes, copy in chart -- -- --          Admitting Physician:  Reginald Cornejo MD  PCP: Norberto Lynch MD    Discharging Nurse:  Carol Unit/Room#: N224/N224-01  Discharging Unit Phone Number: [phone]    Emergency Contact:   Extended Emergency Contact Information  Primary Emergency Contact: Nicolas Aracely, [address] [place] [address] Phone: [phone]  Relation: Child  Secondary Emergency Contact: [address] Phone: [phone]  Work Phone: [phone]  Mobile Phone: [phone]  Relation: Child    Past Surgical History:  Past Surgical History:   Procedure Laterality Date    EYE SURGERY  2010    PER PROBLEM SHEET    HYSTERECTOMY  1975    PER PROBLEM SHEET    LITHOTRIPSY  2004    PER PROBLEM SHEET    TOTAL HIP ARTHROPLASTY Right 2019    VEIN SURGERY  1988    PER PROBLEM SHEET       Immunization History:   Immunization History   Administered Date(s) Administered    COVID-19, Pfizer, PF, 30mcg/0.3mL 2021, 2021    Influenza, High Dose (Fluzone 65 yrs and older) 2018    Influenza, Quadv, IM, PF (6 mo and older Fluzone, Flulaval, Fluarix, and 3 yrs and older Afluria) 2019    Influenza, Quadv, adjuvanted, 65 yrs +, IM, PF (Fluad) 2020    Pneumococcal Conjugate 13-valent (Nuylfan41) 2015    Pneumococcal Polysaccharide (Tnugfyjpg23) 2013       Active Problems:  Patient Active Problem List   Diagnosis Code    Chronic obstructive pulmonary disease (HCC) J44.9    Osteoarthritis M19.90    Essential (primary) hypertension I10    Cystoid macular retinal degeneration H35.359    Dyspnea on exertion R06.00    Other intraretinal microvascular abnormalities H35.09    Age-related cataract H25.9    Posterior rhinorrhea J34.89    Cerebrovascular accident (CVA) due to stenosis of left anterior cerebral artery ([facility]) J85.226    Macular degeneration of right eye H35.30    Arthropathy of both hips M16.0    Hypoxia R09.02    Anxiety F41.9    Vascular dementia ([facility]) F01.50    Irritable bowel syndrome without diarrhea K58.9    Obesity (BMI 30-39. 9) E66.9    Ataxia R27.0    Status post total hip replacement, left B98.956       Isolation/Infection:   Isolation          No Isolation        Patient Infection Status     None to display          Nurse Assessment:  Last Vital Signs: BP (!) 132/58   Pulse 75   Temp 98.2 °F (36.8 °C) (Oral)   Resp 18   Ht 5' (1.524 m)   Wt 162 lb (73.5 kg)   LMP  (LMP Unknown)   SpO2 100%   BMI 31.64 kg/m²     Last documented pain score (0-10 scale): Pain Level: 0  Last Weight:   Wt Readings from Last 1 Encounters:   06/25/21 162 lb (73.5 kg)     Mental Status:  oriented, alert and thought processes intact    IV Access:  - None    Nursing Mobility/ADLs:  Walking   Assisted  Transfer  Assisted  Bathing  Assisted  Dressing  Assisted  Toileting  Assisted  Feeding  Independent  Med Admin  Independent  Med Delivery   whole    Wound Care Documentation and Therapy:        Elimination:  Continence:   · Bowel: Yes  · Bladder: Yes  Urinary Catheter: None   Colostomy/Ileostomy/Ileal Conduit: No       Date of Last BM: 6/26/2021    Intake/Output Summary (Last 24 hours) at 6/26/2021 1358  Last data filed at 6/26/2021 1207  Gross per 24 hour   Intake 1688.33 ml   Output    Net 1688.33 ml     I/O last 3 completed shifts: In: 2678.3 [I.V.:2628.3; IV Piggyback:50]  Out: -     Safety Concerns:      At Risk for Falls    Impairments/Disabilities:      None    Nutrition Therapy:  Current Nutrition Therapy:   - Oral Diet:  General    Routes of Feeding: Oral  Liquids: Thin Liquids  Daily Fluid Restriction: no  Last Modified Barium Swallow with Video (Video Swallowing Test): not done    Treatments at the Time of Hospital Discharge:   Respiratory Treatments: ***  Oxygen Therapy:  is on oxygen at 2 L/min per nasal cannula. Ventilator:    - No ventilator support    Rehab Therapies: Physical Therapy and Occupational Therapy  Weight Bearing Status/Restrictions: No weight bearing restirctions  Other Medical Equipment (for information only, NOT a DME order):  walker  Other Treatments: ***    Patient's personal belongings (please select all that are sent with patient):  Glasses, Dentures upper    RN SIGNATURE:  Electronically signed by Elena Perez RN on 6/26/21 at 2:01 PM EDT    CASE MANAGEMENT/SOCIAL WORK SECTION    Inpatient Status Date: ***    Readmission Risk Assessment Score:  Readmission Risk              Risk of Unplanned Readmission:  0           Discharging to Facility/ Agency   · Name:   · Address:  · Phone:  · Fax:    Dialysis Facility (if applicable)   · Name:  · Address:  · Dialysis Schedule:  · Phone:  · Fax:    / signature: {Esignature:[id number]}    PHYSICIAN SECTION    Prognosis: {Prognosis:3349694877}    Condition at Discharge: [facility] Patient Condition:[id number]}    Rehab Potential (if transferring to Rehab): {Prognosis:4050650888}    Recommended Labs or Other Treatments After Discharge: ***    Physician Certification: I certify the above information and transfer of Fei Smith  is necessary for the continuing treatment of the diagnosis listed and that she requires {Admit to Appropriate Level of Care:33228} for {GREATER/LESS:317042353} 30 days.      Update Admission H&P: {CHP DME Changes in VRJRA:184893834}    PHYSICIAN SIGNATURE:  {Esignature:[id number]}

## 2021-06-26 NOTE — PROGRESS NOTES
Physical Therapy Med Surg Initial Assessment  Facility/Department: [facility]  Room: N224/N224-       NAME: Tea Blackwood  : 1940 (80 y.o.)  MRN: [id number]  CODE STATUS: Full Code    Date of Service: 2021    Patient Diagnosis(es): Status post total hip replacement, left [Z96.642]   No chief complaint on file. Patient Active Problem List    Diagnosis Date Noted    Status post total hip replacement, left 2021    Ataxia 2021    Obesity (BMI 30-39.9) 2021    Irritable bowel syndrome without diarrhea 2020    Vascular dementia ([address] 2020    Anxiety 2020    Hypoxia 2020    Arthropathy of both hips 2019    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    Cerebrovascular accident (CVA) due to stenosis of left anterior cerebral artery ([address] 2019    Posterior rhinorrhea 2019    Dyspnea on exertion 2019    Essential (primary) hypertension 10/07/2013    Chronic obstructive pulmonary disease (HCC)     Osteoarthritis     Age-related cataract 2011    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    Other intraretinal microvascular abnormalities 10/12/2010        Past Medical History:   Diagnosis Date    COPD (chronic obstructive pulmonary disease) (HCC)     Dyspnea on exertion 2019    Hypertension     Impaired cognition     Irritable bowel syndrome without diarrhea 2020    Mild cognitive impairment with memory loss 2020    Osteoarthritis     Tobacco abuse 2019    Tobacco use disorder 2019    Vascular dementia ([address] 2020     Past Surgical History:   Procedure Laterality Date    EYE SURGERY  2010    PER PROBLEM SHEET    HYSTERECTOMY  1975    PER PROBLEM SHEET    LITHOTRIPSY  2004    PER PROBLEM SHEET    TOTAL HIP ARTHROPLASTY Right 2019    VEIN SURGERY  1988    PER PROBLEM SHEET       Chart Reviewed:  Yes  Additional Pertinent Hx: COPD, HTN, OA, vascular dementia, CVA    Restrictions:  Restrictions/Precautions: Weight Bearing (Simultaneous filing. User may not have seen previous data.)  Lower Extremity Weight Bearing Restrictions  Left Lower Extremity Weight Bearing: Weight Bearing As Tolerated  Position Activity Restriction  Hip Precautions: No hip internal rotation, No ABduction, No ADduction     SUBJECTIVE: Subjective: Pt is s/p Lt anterior THR. Pt states she is doing well. Good pain control.     Pain  Pre Treatment Pain Screening  Pain at present: 5  Intervention List: Patient able to continue with treatment    Post Treatment Pain Screening:   Pain Assessment  Pain Level: 3  Pain Type: Acute pain  Pain Location: Hip  Pain Orientation: Left    Prior Level of Function:  Social/Functional History  Type of Home: House  Home Layout: One level, Laundry in basement  Home Access: Stairs to enter with rails  Entrance Stairs - Number of Steps: 2  Entrance Stairs - Rails: Both  Bathroom Shower/Tub: Walk-in shower  Bathroom Toilet: Handicap height  Bathroom Equipment: Grab bars in shower, Built-in shower seat  Home Equipment: Cane, Rolling walker  Receives Help From: Family  ADL Assistance: Independent  Homemaking Assistance: Independent  Homemaking Responsibilities: Yes  Ambulation Assistance: Independent  Transfer Assistance: Independent  Leisure & Hobbies: cooking, lesyl, TV    OBJECTIVE:   Vision: Impaired (R eye deficits)  Vision Exceptions: Wears glasses for reading  Hearing: Exceptions to [facility]  Hearing Exceptions: Hard of hearing/hearing concerns    Cognition:  Overall Orientation Status: Impaired (vascular dementia)    Observation/Palpation  Observation: dressing intact Lt hip, no apparent distress    ROM:  RLE AROM: WFL  LLE PROM: WFL    Strength:  Strength RLE  Strength RLE: WFL  Strength LLE  Strength LLE: Exception  L Hip Flexion: 3-/5  L Knee Flexion: 4/5  L Knee Extension: 4/5  L Ankle Dorsiflexion: 4+/5    Neuro:  Balance  Sitting - Static: Good  Sitting - Dynamic: Good  Standing - Static: Fair  Standing - Dynamic: Fair;-             Bed mobility  Rolling to Left: Contact guard assistance  Rolling to Right: Contact guard assistance  Supine to Sit: Contact guard assistance  Sit to Supine: Contact guard assistance    Transfers  Sit to Stand: Contact guard assistance  Stand to sit: Contact guard assistance    Ambulation  Ambulation?: Yes  Ambulation 1  Surface: level tile  Device: Rolling Walker  Assistance: Minimal assistance  Quality of Gait: mild ataxic foot placement, VCs for sequencing, decreased step height and step length bilaterally, decreased sasha, staggering  Distance: 5 ft    Stairs/Curb  Stairs?: No         Activity Tolerance  Activity Tolerance: Patient Tolerated treatment well          PT Education  PT Education: Goals;PT Role;Plan of Care;Disease Specific Education;Gait Training;Functional Mobility Training;Family Education  Patient Education: discussed importance of AROM while sitting in chair    ASSESSMENT:   Body structures, Functions, Activity limitations: Decreased functional mobility ; Decreased strength;Decreased endurance;Decreased balance;Decreased coordination;Decreased posture  Decision Making: Medium Complexity  History: personal factors: lives with son, age; contributing PMH: COPD, HTN, OA, vascular dementia, CVA, Rt THR, Lt THR  Exam: musculoskeletal, pain and sensory systems involved impacting strength, balance, gait, transfers, bed mob  Clinical Presentation: complicated    Treatment Diagnosis: impaired mobility, imbalance, LE weakness  Prognosis: Good  Patient Education: discussed importance of AROM while sitting in chair  Barriers to Learning: some dementia    DISCHARGE RECOMMENDATIONS:  Discharge Recommendations: Continue to assess pending progress    Assessment: Pt presents s/p Lt anterior THR with decreased functional mobility. Pt is doing well. Pt would benefit from further PT prior to discharge.   REQUIRES PT FOLLOW UP: Yes PLAN OF CARE:  Plan  Times per week: 5-7  Times per day: Twice a day  Current Treatment Recommendations: Strengthening, Balance Training, Functional Mobility Training, Transfer Training, Endurance Training, Gait Training, Stair training, Neuromuscular Re-education, Home Exercise Program, Safety Education & Training, Patient/Caregiver Education & Training, Equipment Evaluation, Education, & procurement  Plan Comment: transfer care of pt to supervising [facility] PT  Safety Devices  Type of devices: Call light within reach, Chair alarm in place, Left in chair    Goals:  Patient goals : return home  Short term goals  Short term goal 1: Independent bed mob  Short term goal 2: Independent transfers  Long term goals  Long term goal 1: Ambulate >/= 100 ft with supervision with ww  Long term goal 2: Up/ down 4 steps with 2 HRs with SBA  Long term goal 3: Increase strength and balance to safely achieve all goals    [facility] (6 CLICK) BASIC MOBILITY  AM-PAC Inpatient Mobility Raw Score : 16     Therapy Time:   Individual   Time In 0850   Time Out 0914   Minutes 24   Timed Code Treatment Minutes: 0 Minutes       Loree Reece PT, 06/26/21 at 9:25 AM         Definitions for assistance levels  Independent = pt does not require any physical supervision or assistance from another person for activity completion. Device may be needed.   Stand by assistance = pt requires verbal cues or instructions from another person, close to but not touching, to perform the activity  Minimal assistance= pt performs 75% or more of the activity; assistance is required to complete the activity  Moderate assistance= pt performs 50% of the activity; assistance is required to complete the activity  Maximal assistance = pt performs 25% of the activity; assistance is required to complete the activity  Dependent = pt requires total physical assistance to accomplish the task

## 2021-06-26 NOTE — PROGRESS NOTES
Extremity Weight Bearing Restrictions  Left Lower Extremity Weight Bearing: Weight Bearing As Tolerated  Position Activity Restriction  Hip Precautions: No hip internal rotation; No ABduction; No hip extension  Other position/activity restrictions: anterior hip precautions    SUBJECTIVE   General  Chart Reviewed: Yes  Family / Caregiver Present: Yes  Subjective  Subjective: \"I am hoping to go home today. \"    Pre-Session Pain Report  Pre Treatment Pain Screening  Pain at present: 0  Scale Used: Numeric Score  Intervention List: Patient able to continue with treatment  Pain Screening  Patient Currently in Pain: No       Post-Session Pain Report  Pain Assessment  Pain Assessment: 0-10  Pain Level: 0         OBJECTIVE        Bed mobility  Supine to Sit: Supervision  Sit to Supine:  (DNT pt into chair.)    Transfers  Sit to Stand: Stand by assistance  Stand to sit: Stand by assistance  Comment: vc's for hand placement and sequencing. pt with good ability. Ambulation  Ambulation?: Yes  Ambulation 1  Surface: level tile  Device: Rolling Walker  Assistance: Stand by assistance  Quality of Gait: mild ataxic foot placement, VCs for sequencing, decreased step height and step length bilaterally, decreased sasha, staggering  Distance: 30' x2    Stairs/Curb  Stairs?: Yes  Stairs  # Steps : 4  Stairs Height: 6\"  Rails: Right ascending  Device: Hand Held Assist (L)  Assistance: Stand by assistance  Comment: vc's for non reciprocal pattern, good ability, pt with one instance of leading with incorrect LE, max education and cues given to avoid this. ASSESSMENT   Assessment: increased time answering pt's and family members questions, pt with good mobility, limited by confusion and difficulty following through on cues for correct sequencing while navigating stairs.      Discharge Recommendations:  Continue to assess pending progress    Goals  Short term goals  Short term goal 1: Independent bed mob  Short term goal 2: Independent transfers  Long term goals  Long term goal 1: Ambulate >/= 100 ft with supervision with ww  Long term goal 2: Up/ down 4 steps with 2 HRs with SBA  Long term goal 3: Increase strength and balance to safely achieve all goals  Patient Goals   Patient goals : return home    PLAN    Times per week: 5-7  Times per day: Twice a day  Safety Devices  Type of devices: Call light within reach, Chair alarm in place, Left in chair     REGIS (6 CLICK) [place] [address] Raw Score : 18      Therapy Time   Individual   Time In 1300   Time Out 1329   Minutes 29     Gait: 23  BM/trsf: [address] PTA, 06/26/21 at 2:04 PM         Definitions for assistance levels  Independent = pt does not require any physical supervision or assistance from another person for activity completion. Device may be needed.   Stand by assistance = pt requires verbal cues or instructions from another person, close to but not touching, to perform the activity  Minimal assistance= pt performs 75% or more of the activity; assistance is required to complete the activity  Moderate assistance= pt performs 50% of the activity; assistance is required to complete the activity  Maximal assistance = pt performs 25% of the activity; assistance is required to complete the activity  Dependent = pt requires total physical assistance to accomplish the task

## 2021-06-26 NOTE — PROGRESS NOTES
[facility] AT [place] Respiratory Therapy Evaluation   Current Order:  Alb inhaler before breakfast    Home Regimen: alb in morning     Ordering Physician: trinh  Re-evaluation Date:  6/28     Diagnosis: hip replacement      Patient Status: Stable / Unstable + Physician notified    The following MDI Criteria must be met in order to convert aerosol to MDI with spacer.  If unable to meet, MDI will be converted to aerosol:  []  Patient able to demonstrate the ability to use MDI effectively  []  Patient alert and cooperative  []  Patient able to take deep breath with 5-10 second hold  []  Medication(s) available in this delivery method   []  Peak flow greater than or equal to 200 ml/min            Current Order Substituted To  (same drug, same frequency)   Aerosol to MDI [] Albuterol Sulfate 0.083% unit dose by aerosol Albuterol Sulfate MDI 2 puffs by inhalation with spacer    [] Levalbuterol 1.25 mg unit dose by aerosol Levalbuterol MDI 2 puffs by inhalation with spacer    [] Levalbuterol 0.63 mg unit dose by aerosol Levalbuterol MDI 2 puffs by inhalation with spacer    [] Ipratropium Bromide 0.02% unit dose by aerosol Ipratropium Bromide MDI 2 puffs by inhalation with spacer    [] Duoneb (Ipratropium + Albuterol) unit dose by aerosol Ipratropium MDI + Albuterol MDI 2 puffs by inhalation w/spacer   MDI to Aerosol [] Albuterol Sulfate MDI Albuterol Sulfate 0.083% unit dose by aerosol    [] Levalbuterol MDI 2 puffs by inhalation Levalbuterol 1.25 mg unit dose by aerosol    [] Ipratropium Bromide MDI by inhalation Ipratropium Bromide 0.02% unit dose by aerosol    [] Combivent (Ipratropium + Albuterol) MDI by inhalation Duoneb (Ipratropium + Albuterol) unit dose by aerosol       Treatment Assessment [Frequency/Schedule]:  Change frequency to: _________________no change home med_________________________________per Protocol, P&T, MEC      Points 0 1 2 3 4   Pulmonary Status  Non-Smoker  []   Smoking history   < 20 pack years  []

## 2021-06-26 NOTE — DISCHARGE SUMMARY
Discharge summary  This patient Lenora Boggs was admitted to the hospital on 6/25/2021  after undergoing Procedure(s) (LRB):  LEFT HIP DIRECT ANTERIOR HIP REPLACEMENT (Left) without complications that morning. During the postoperative period,while in hospital, patient was medically managed by the hospitalist. Please see medial notes and H&P for patients additional diagnoses. Ortho agrees with all medical diagnoses and treatments while patient in hospital.  No significant or unexpected findings or abnormal ortho imaging were noted during the hospital stay    Hospital course  Patient tolerated surgical procedure well and there was no complications. Patient progressed adequtly through their recovery during hospital stay including PT and rehabilitation. DVT prophylaxis was implemented POD#1  Patient was then D/C on  to Home with home health care   in stable condition. Patient was instructed on the use of pain medications, the signs and symptoms of infection, and was given our number to call should they have any questions or concerns following discharge. EXAM: XR PELVIS (1-2 VIEWS)       HISTORY: Postoperative evaluation total hip arthroplasty       TECHNIQUE: Frontal view of the pelvis       COMPARISON: Radiographs from December 24, 2019       FINDINGS:       Postsurgical changes of total hip arthroplasty including soft tissue emphysema. Alignment is anatomic. No periprosthetic abnormality.  Postsurgical changes of right total hip arthroplasty also identified.           Impression       Postsurgical changes of left total hip arthroplasty.

## 2021-06-29 NOTE — TELEPHONE ENCOUNTER
From: Sergei Mcconnell  To: Carmela Velázquez MD  Sent: 6/28/2021 5:26 PM EDT  Subject: Prescription Spencer Petit Dr Fabio Lomas family has discussed having my mom stop taking Lexapro now that she lives with my brother and her memory has been better. We wanted your thoughts. We know you usually cannot just stop this type of medication. Please let me know your professional opinion.      Thank you,   Paco Skelton

## 2021-07-13 PROBLEM — K57.92 ACUTE DIVERTICULITIS: Status: ACTIVE | Noted: 2021-01-01

## 2021-07-13 PROBLEM — I95.9 HYPOTENSION: Chronic | Status: ACTIVE | Noted: 2021-01-01

## 2021-07-13 PROBLEM — D64.9 ANEMIA: Chronic | Status: ACTIVE | Noted: 2021-01-01

## 2021-07-13 NOTE — ED NOTES
Pt refused straight cath. To BR for void. UA obtained and sent to lab.       Venus Paget, RN  07/13/21 6310

## 2021-07-13 NOTE — PROGRESS NOTES
Subjective  Siobhan Brock, 80 y.o. female presents today with:  Chief Complaint   Patient presents with    Fatigue     pt eat little bit any and get full quick and get tired really fast            HPI    Had left [facility] on 6/25/2021. Slid off bed night of discharge. One day later had significant anemia. Now with profound fatigue, abdominal pain (diffuse) when taking TUMS or calcium, blood in stool when straining, No blood in urine. No dysuria or polyuria. No nausea or vomiting. No diarrhea. Occasional constipation. Mood is very irritable. Son accompanies her. States that she has been spending a lot of time in bed and has reduced appetite. Has h/o COPD on HS O2. Was recently switched to 24/7 O2. No other questions and or concerns for today's visit      Review of Systems  See above.     Past Medical History:   Diagnosis Date    COPD (chronic obstructive pulmonary disease) (HCC)     Dyspnea on exertion 7/31/2019    Hypertension     Hypotension 7/13/2021    Impaired cognition     Irritable bowel syndrome without diarrhea 12/22/2020    Mild cognitive impairment with memory loss 9/25/2020    Osteoarthritis     Tobacco abuse 5/6/2019    Tobacco use disorder 5/6/2019    Vascular dementia ([address] 6/22/2020     Past Surgical History:   Procedure Laterality Date    EYE SURGERY  01/01/2010    PER PROBLEM SHEET    HYSTERECTOMY  01/01/1975    PER PROBLEM SHEET    LITHOTRIPSY  01/01/2004    PER PROBLEM SHEET    TOTAL HIP ARTHROPLASTY Right 2019    TOTAL HIP ARTHROPLASTY Left 6/25/2021    LEFT HIP DIRECT ANTERIOR HIP REPLACEMENT performed by Landy Cordova MD at [facility]  01/01/1988    PER PROBLEM SHEET     Social History     Socioeconomic History    Marital status:      Spouse name: Not on file    Number of children: Not on file    Years of education: Not on file    Highest education level: Not on file   Occupational History    Not on file   Tobacco Use    Smoking status: Former Smoker     Packs/day: 0.75     Years: 45.00     Pack years: 33.75     Types: Cigarettes     Start date: 5     Quit date: 2019     Years since quittin.4    Smokeless tobacco: Never Used   Vaping Use    Vaping Use: Never used   Substance and Sexual Activity    Alcohol use: No     Comment: social    Drug use: No    Sexual activity: Not on file   Other Topics Concern    Not on file   Social History Narrative         Lives With: Alone son is helpful and is her POA for health care    Type of Home: House    Home Layout: One level    Home Access: Stairs to enter with rails    Entrance Stairs - Number of Steps: 2    Bathroom Shower/Tub: Tub/Shower unit, Walk-in shower    Bathroom Equipment: Hand-held shower    ADL Assistance: Independent    Homemaking Assistance: Independent    Ambulation Assistance: Independent(No AD)    Transfer Assistance: Independent    Active : Yes    Additional Comments: Pt. reports she has fallen at home but not recently and not regularly. Pt. having word finding difficulty, but sons are present and clarify pt's answers     Social Determinants of Health     Financial Resource Strain: Low Risk     Difficulty of Paying Living Expenses: Not hard at all   Food Insecurity: No Food Insecurity    Worried About Running Out of Food in the Last Year: Never true    [address] in the Last Year: Never true   Transportation Needs:     Lack of Transportation (Medical):      Lack of Transportation (Non-Medical):    Physical Activity:     Days of Exercise per Week:     Minutes of Exercise per Session:    Stress:     Feeling of Stress :    Social Connections:     Frequency of Communication with Friends and Family:     Frequency of Social Gatherings with Friends and Family:     Attends Druze Services:     Active Member of Clubs or Organizations:     Attends Club or Organization Meetings:     Marital Status:    Intimate Partner Violence:     Fear of Current or Ex-Partner: 1018   BP: (!) 78/50 (!) 80/50   Site: Left Upper Arm Right Upper Arm   Position: Sitting    Cuff Size: Large Adult Medium Adult   Pulse: (!) 40 78   Resp: 16    Temp: 96.7 °F (35.9 °C)    TempSrc: Temporal    SpO2: 90%    Weight: 155 lb 9.6 oz (70.6 kg)    Height: 5' (1.524 m)         Physical Exam  Constitutional:       General: She is in acute distress. Appearance: She is well-developed. She is ill-appearing. HENT:      Head: Normocephalic and atraumatic. Eyes:      General: No scleral icterus. Conjunctiva/sclera: Conjunctivae normal.   Cardiovascular:      Rate and Rhythm: Normal rate and regular rhythm. Heart sounds: Normal heart sounds. Pulmonary:      Effort: No respiratory distress. Breath sounds: No wheezing or rales. Abdominal:      Palpations: Abdomen is soft. Skin:     General: Skin is warm and dry. Coloration: Skin is pale. Neurological:      Mental Status: She is alert and oriented to person, place, and time.    Psychiatric:      Comments: Patient extremely fatigued, irritable, low mood       Lab Results   Component Value Date    WBC 12.1 (H) 06/26/2021    HGB 8.8 (L) 06/26/2021    HCT 26.5 (L) 06/26/2021     06/26/2021    CHOL 121 02/11/2020    TRIG 131 02/11/2020    HDL 59 04/22/2021    ALT 10 11/12/2019    AST 16 11/12/2019     06/26/2021    K 4.3 06/26/2021     06/26/2021    CREATININE 1.03 (H) 06/26/2021    BUN 27 (H) 06/26/2021    CO2 25 06/26/2021    TSH 1.760 05/06/2019    INR 0.9 11/12/2019    LABA1C 6.0 (H) 11/13/2019    LABMICR <1.20 05/06/2019         Lab Results   Component Value Date    LABA1C 6.0 (H) 11/13/2019     Lab Results   Component Value Date    LABMICR <1.20 05/06/2019    CREATININE 1.03 (H) 06/26/2021     Lab Results   Component Value Date    ALT 10 11/12/2019    AST 16 11/12/2019     Lab Results   Component Value Date    CHOL 121 02/11/2020    TRIG 131 02/11/2020    HDL 59 04/22/2021    LDLCALC 64 04/22/2021 Assessment & Plan   Visit Diagnoses and Associated Orders     Hypotension, unspecified hypotension type        Given severe distress and hypotension possibly d/t acute on chronic blood loss, patient to be transported to ER via EMS. Anemia, unspecified type                 No orders of the defined types were placed in this encounter. No orders of the defined types were placed in this encounter. There are no discontinued medications. Return in about 1 week (around 7/20/2021) for f/u low BP, fatigue. Controlled Substance Monitoring:    Acute and Chronic Pain Monitoring:   No flowsheet data found.         Chantal Ching MD

## 2021-07-13 NOTE — ED TRIAGE NOTES
80 yr old female received via squad from doctors office with c/o low bp, low hgb, abdominal pain, poor appetite. Pt is alert and oriented, responds appropriately, follows commands. 2LNC in use. VSS. Afebrile. Dr. Watkins Prime in to see pt.

## 2021-07-13 NOTE — CARE COORDINATION
[facility] EMERGENCY MEDICAL CENTER AT [place] Case Management Initial Discharge Assessment    Met with patient and her son Willy Pedroza at bedside in ER to discuss patient's baseline status, available resources, and discharge plan. PCP: Will Valdez MD                                  Date of Last Visit: Today - 7/13/21  If no PCP, list provided? N/A    Discharge Planning    Living Arrangements: Patient lives independently at home. Who do you live with? Son Willy Pedroza ([phone]) and daughter-in-law [facility] ([phone]). Who helps you with your care: Patient is independent at baseline but family is able to assist as needed. Patient has four sons who all are able to assist with her care PRN. If lives at home: Do you have any barriers navigating in your home? No    Patient can perform ADL? Yes    Current Services (outpatient and in home): Outpatient PT/OT ([address]) and was just discharged from [facility] for PT/OT. Dialysis: No    Is transportation available to get to your appointments? Yes - Patient's sons take turns providing transportation. DME Equipment: Patient has the following DME available: cane, walker, wheelchair, shower seat, grab bars, raised toilet seat    Respiratory equipment: O2 at [facility] PRN and at . Respiratory provider: [address]: Julien Rios on [facility] + mail order [facility] for long-term meds. Consult with Medication Assistance Program?  No      Patient agreeable to [facility] [place]? Yes, [facility] [address] at [facility] or [facility]    Patient agreeable to SNF/Rehab? No - Not needed per patient. Other discharge needs identified? Other - TBD after work-up. Patient has very good family support. Does Patient Have a High-Risk for Readmission Diagnosis (CHF, PN, MI, COPD)? Yes     History: COPD on home O2, BYRNE, vascular dementia, IBS, hypotension. Initial Discharge Plan?  (Note: please see concurrent daily documentation for any updates after initial note). Home with family and PT/OT (home care vs outpatient therapy).     Readmission Risk              Risk of Unplanned Readmission:  0         Electronically signed by Christine Dardne RN on 7/13/2021 at 3:33 PM

## 2021-07-13 NOTE — ED PROVIDER NOTES
[address] ED  eMERGENCY dEPARTMENT eNCOUnter      Pt Name: Sai Pappas  MRN: [id number]  Barbygfdeloris 1940  Date of evaluation: 7/13/2021  Provider: Maame Lentz, [facility]       Chief Complaint   Patient presents with    Abdominal Pain    Hypotension         HISTORY OF PRESENT ILLNESS   (Location/Symptom, Timing/Onset,Context/Setting, Quality, Duration, Modifying Factors, Severity)  Note limiting factors. Sai Pappas is a 80 y.o. female who presents to the emergency department with complaint of 4 to 5 days of abdominal pain. Patient laying in bed often and reports that she is easily full. Patient has some small amount of blood in her stool. Patient complains of severe diffuse mid to lower abdominal pain. Patient is her doctor's office had a low blood pressure and was sickly. Patient had blood work done June 26 that showed a hemoglobin of 8.8. Patient denies any fever, chills or cough. On exam the patient has guarding and abdominal pain to the right sided abdomen and also left lower quadrant. Patient denies any modifying factors making her symptoms any better or any worse. The history is provided by the patient. NursingNotes were reviewed. REVIEW OF SYSTEMS    (2-9 systems for level 4, 10 or more for level 5)     Review of Systems    Except as noted above the remainder of the review of systems was reviewed and negative.        PAST MEDICAL HISTORY     Past Medical History:   Diagnosis Date    COPD (chronic obstructive pulmonary disease) ([facility]     Dyspnea on exertion 7/31/2019    Hypertension     Hypotension 7/13/2021    Impaired cognition     Irritable bowel syndrome without diarrhea 12/22/2020    Mild cognitive impairment with memory loss 9/25/2020    Osteoarthritis     Tobacco abuse 5/6/2019    Tobacco use disorder 5/6/2019    Vascular dementia ([facility] 6/22/2020         SURGICALHISTORY       Past Surgical History:   Procedure Laterality Date    EYE SURGERY  01/01/2010    PER PROBLEM SHEET    HYSTERECTOMY  01/01/1975    PER PROBLEM SHEET    LITHOTRIPSY  01/01/2004    PER PROBLEM SHEET    TOTAL HIP ARTHROPLASTY Right 2019    TOTAL HIP ARTHROPLASTY Left 6/25/2021    LEFT HIP DIRECT ANTERIOR HIP REPLACEMENT performed by Avtar Grace MD at [address] [facility]  01/01/1988    PER PROBLEM SHEET         CURRENT MEDICATIONS       Previous Medications    ALBUTEROL SULFATE  (90 BASE) MCG/ACT INHALER    Inhale 2 puffs into the lungs every morning (before breakfast)    ALENDRONATE (FOSAMAX) 70 MG TABLET    Take 1 tablet by mouth every 7 days    AMLODIPINE (NORVASC) 5 MG TABLET    TAKE 1 TABLET BY MOUTH DAILY. ASCORBIC ACID (VITAMIN C) 500 MG TABLET    Take 500 mg by mouth daily. ASPIRIN 81 MG EC TABLET    Take 1 tablet by mouth 2 times daily    ATORVASTATIN (LIPITOR) 20 MG TABLET    Take 1 tablet by mouth daily    CALCIUM CARBONATE 648 MG TABS    Take 1 tablet by mouth 2 times daily    COENZYME Q10 100 MG CAPS CAPSULE    Take 100 mg by mouth 2 times daily     DONEPEZIL (ARICEPT) 5 MG TABLET    Take 1 tablet by mouth nightly    ESCITALOPRAM (LEXAPRO) 10 MG TABLET    Take 1 tablet by mouth daily    FLUTICASONE (FLONASE) 50 MCG/ACT NASAL SPRAY    USE 2 SPRAYS NASALLY DAILY    MULTIPLE VITAMINS-MINERALS (PRESERVISION AREDS 2) CAPS    Take 1 capsule by mouth daily    OXYGEN    Start oxygen therapy at 2 lit with activity and sleep , give POC for ambulation     Dx COPD    UMECLIDINIUM-VILANTEROL (ANORO ELLIPTA) 62.5-25 MCG/INH AEPB INHALER    Inhale 1 puff into the lungs daily    VITAMIN B-12 (CYANOCOBALAMIN) 500 MCG TABLET    Take 500 mcg by mouth daily    VITAMIN D (CHOLECALCIFEROL) 1000 UNIT TABS TABLET    Take 1,000 Int'l Units by mouth daily. ALLERGIES     Patient has no known allergies.     FAMILY HISTORY       Family History   Problem Relation Age of Onset    Diabetes Other         GRANDMOTHER          SOCIAL HISTORY       Social History Socioeconomic History    Marital status:      Spouse name: Not on file    Number of children: Not on file    Years of education: Not on file    Highest education level: Not on file   Occupational History    Not on file   Tobacco Use    Smoking status: Former Smoker     Packs/day: 0.75     Years: 45.00     Pack years: 33.75     Types: Cigarettes     Start date: 5     Quit date: 2019     Years since quittin.4    Smokeless tobacco: Never Used   Vaping Use    Vaping Use: Never used   Substance and Sexual Activity    Alcohol use: No     Comment: social    Drug use: No    Sexual activity: Not on file   Other Topics Concern    Not on file   Social History Narrative         Lives With: Alone son is helpful and is her POA for health care    Type of Home: [address] [place] [address] Street: One level    Home Access: Stairs to enter with rails    Entrance Stairs - Number of Steps: 2    Bathroom Shower/Tub: Tub/Shower unit, Walk-in shower    Bathroom Equipment: Hand-held shower    ADL Assistance: Independent    Homemaking Assistance: Independent    Ambulation Assistance: Independent(No AD)    Transfer Assistance: Independent    Active : Yes    Additional Comments: Pt. reports she has fallen at home but not recently and not regularly. Pt. having word finding difficulty, but sons are present and clarify pt's answers     Social Determinants of Health     Financial Resource Strain: Low Risk     Difficulty of Paying Living Expenses: Not hard at all   Food Insecurity: No Food Insecurity    Worried About Running Out of Food in the Last Year: Never true    [address] in the Last Year: Never true   Transportation Needs:     Lack of Transportation (Medical):      Lack of Transportation (Non-Medical):    Physical Activity:     Days of Exercise per Week:     Minutes of Exercise per Session:    Stress:     Feeling of Stress :    Social Connections:     Frequency of Communication with Friends and Family:     Frequency of Social Gatherings with Friends and Family:     Attends Scientologist Services:     Active Member of Clubs or Organizations:     Attends Club or Organization Meetings:     Marital Status:    Intimate Partner Violence:     Fear of Current or Ex-Partner:     Emotionally Abused:     Physically Abused:     Sexually Abused:        SCREENINGS      @FLOW([id number])@      PHYSICAL EXAM    (up to 7 for level 4, 8 or more for level 5)     ED Triage Vitals   BP Temp Temp Source Pulse Resp SpO2 Height Weight   07/13/21 1110 07/13/21 1110 07/13/21 1110 07/13/21 1110 07/13/21 1110 07/13/21 1145 07/13/21 1110 07/13/21 1110   119/73 97.9 °F (36.6 °C) Oral 70 18 99 % 5' (1.524 m) 158 lb (71.7 kg)       Physical Exam    DIAGNOSTIC RESULTS     EKG: All EKG's are interpreted by the Emergency Department Physician who either signs or Co-signsthis chart in the absence of a cardiologist.        RADIOLOGY:   Non-plain filmimages such as CT, Ultrasound and MRI are read by the radiologist. Plain radiographic images are visualized and preliminarily interpreted by the emergency physician with the below findings:        Interpretation per the Radiologist below, if available at the time ofthis note:    CT ABDOMEN PELVIS W IV CONTRAST Additional Contrast? None   Final Result   1. Suspected acute diverticulitis along the rectosigmoid region with adjacent soft tissue stranding. No abscess or drainable fluid collection. 2.  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                      ED BEDSIDE ULTRASOUND:   Performed by ED Physician - none    LABS:  Labs Reviewed   CBC WITH AUTO DIFFERENTIAL - Abnormal; Notable for the following components:       Result Value    WBC 11.0 (*)     RBC 3.12 (*)     Hemoglobin 9.4 (*)     Hematocrit 28.9 (*)     MCHC 32.7 (*)     RDW 16.0 (*)     Platelets 207 (*)     Neutrophils Absolute 9.5 (*)     Lymphocytes Absolute 0.6 (*)     All other components within normal limits   COMPREHENSIVE METABOLIC PANEL - Abnormal; Notable for the following components:    Glucose 133 (*)     Alkaline Phosphatase 146 (*)     All other components within normal limits   URINE RT REFLEX TO CULTURE - Abnormal; Notable for the following components:    Ketones, Urine 15 (*)     Protein, UA TRACE (*)     Leukocyte Esterase, Urine SMALL (*)     All other components within normal limits   PROCALCITONIN - Abnormal; Notable for the following components:    Procalcitonin 0.32 (*)     All other components within normal limits   PROTIME-INR   APTT   LACTIC ACID, PLASMA   LIPASE   MICROSCOPIC URINALYSIS   TYPE AND SCREEN       All other labs were within normal range or not returned as of this dictation. EMERGENCY DEPARTMENT COURSE and DIFFERENTIAL DIAGNOSIS/MDM:   Vitals:    Vitals:    07/13/21 1110 07/13/21 1145 07/13/21 1354 07/13/21 1510   BP: 119/73 129/65 131/70 122/64   Pulse: 70 70 82 83   Resp: 18 17 19 18   Temp: 97.9 °F (36.6 °C)      TempSrc: Oral      SpO2:  99% 97% 99%   Weight: 158 lb (71.7 kg)      Height: 5' (1.524 m)              MDM   Patient has severe dehydration with the gravity 1.074. Patient has acute diverticulitis. Elevated white count and elevated procalcitonin. Patient did well from the IV fluids 500 cc that she is given prior to arrival.  On reexam the findings were discussed with the patient and her son. ER physician spoke via teleconference with Dr. Lenka Stewart who accepted the patient. CONSULTS:  None    PROCEDURES:  Unless otherwise noted below, none     Procedures    FINAL IMPRESSION      1. Acute diverticulitis    2. Acute cystitis without hematuria    3. Dehydration    4. Poor fluid intake    5. Transient hypotension    6. Severe dehydration          DISPOSITION/PLAN   DISPOSITION Admitted 07/13/2021 03:07:14 PM      PATIENT REFERRED TO:  No follow-up provider specified.     DISCHARGE MEDICATIONS:  New Prescriptions    No medications on file          (Please note that portions of this note were completed with a voice recognition program.  Efforts were made to edit the dictations but occasionally words are mis-transcribed.)    Shaw Hernandez DO (electronically signed)  Attending Emergency Physician          Shaw Hernandez DO  07/13/21 8220

## 2021-07-13 NOTE — H&P
Hospital Medicine  History and Physical    Patient:  Anam Nielsen  MRN: [id number]    CHIEF COMPLAINT:    Chief Complaint   Patient presents with    Abdominal Pain    Hypotension       History Obtained From:  Patient, EMR  Primary Care Physician: Marcos Murphy MD    HISTORY OF PRESENT ILLNESS:   72-year-old female with chronic respiratory failure with hypoxia on 2-3 L O2, history of TIA, hypertension, recent hip replacement presents to the ED from her PCPs office with 2-week history of abdominal pain and soft blood pressure-blood pressure at PCPs office was 78/50 and 80/50. In the emergency department, she was found to have stable vitals and CT imaging showed suspected acute diverticulitis along the rectosigmoid region. Given her limited intake and persistent abdominal pain, she is recommended admission for IV fluid and IV antibiotics. The patient reports off and on crampy left lower quadrant abdominal pain that is not provoked or relieved by anything. She denies any fever, chills, weight change, nausea, vomiting. She also denies chest pain, dyspnea, change in bowels or urination. She does report usually struggling to have a bowel movement which she attributes to limited p.o. intake. She does have history of hemorrhoids-she admits she is seeing spots of blood in her bowel movements from time to time. She no longer smokes cigarettes. She denies alcohol or illicit drug use.     Past Medical History:      Diagnosis Date    COPD (chronic obstructive pulmonary disease) ([address]     Dyspnea on exertion 7/31/2019    Hypertension     Hypotension 7/13/2021    Impaired cognition     Irritable bowel syndrome without diarrhea 12/22/2020    Mild cognitive impairment with memory loss 9/25/2020    Osteoarthritis     Tobacco abuse 5/6/2019    Tobacco use disorder 5/6/2019    Vascular dementia ([address] 6/22/2020       Past Surgical History:      Procedure Laterality Date    EYE SURGERY  01/01/2010 Provider, MD   vitamin D (CHOLECALCIFEROL) 1000 UNIT TABS tablet Take 1,000 Int'l Units by mouth daily. Yes Historical Provider, MD   calcium carbonate 648 MG TABS Take 1 tablet by mouth 2 times daily 12/22/20 12/22/21  Jacinda Darnell MD   Multiple Vitamins-Minerals (PRESERVISION AREDS 2) CAPS Take 1 capsule by mouth daily 5/6/19   Jacinda Darnell MD   Ascorbic Acid (VITAMIN C) 500 MG tablet Take 500 mg by mouth daily. Patient not taking: Reported on 7/13/2021    Historical Provider, MD       Allergies:  Patient has no known allergies. Social History:   TOBACCO:   reports that she quit smoking about 2 years ago. Her smoking use included cigarettes. She started smoking about 54 years ago. She has a 33.75 pack-year smoking history. She has never used smokeless tobacco.  ETOH:   reports no history of alcohol use. Family History:       Problem Relation Age of Onset    Diabetes Other         GRANDMOTHER       REVIEW OF SYSTEMS:  Ten systems reviewed and negative except for stated in HPI    Physical Exam:    Vitals: BP (!) 146/63   Pulse 88   Temp 98 °F (36.7 °C) (Oral)   Resp 22   Ht 5' (1.524 m)   Wt 158 lb (71.7 kg)   LMP  (LMP Unknown)   SpO2 99%   BMI 30.86 kg/m²   General appearance: alert, appears stated age and cooperative. NAD. Elderly. On oxygen  Skin: Skin color, texture, turgor normal. No rashes or lesions  HEENT: eomi, perrla. MMM  Neck: No JVD or lymphadenopathy  Lungs: CTA bilaterally. No wheeze   Heart: RRR, no murmur or gallp  Abdomen: soft, nontender. Bsx4. No masses or organomegaly  Extremities: no edema, redness, or tenderness in calves.  Cap refill <2s  Neurologic: No focal deficits     Recent Labs     07/13/21  1115   WBC 11.0*   HGB 9.4*   *     Recent Labs     07/13/21  1115      K 3.5      CO2 27   BUN 17   CREATININE 0.84   GLUCOSE 133*   AST 18   ALT 8   BILITOT 0.5   ALKPHOS 146*     Troponin T: No results for input(s): TROPONINI in the last 72 hours. ABGs: No results found for: PHART, PO2ART, DLC2YSN  INR:   Recent Labs     07/13/21  1115   INR 1.1     URINALYSIS:  Recent Labs     07/13/21  1115   COLORU Yellow   PHUR 6.0   WBCUA 3-5   RBCUA 0-2   BACTERIA Negative   CLARITYU Clear   SPECGRAV 1.074   LEUKOCYTESUR SMALL*   UROBILINOGEN 1.0   BILIRUBINUR Negative   BLOODU Negative   GLUCOSEU Negative     -----------------------------------------------------------------   CT ABDOMEN PELVIS W IV CONTRAST Additional Contrast? None    Result Date: 7/13/2021  EXAMINATION:  CT ABDOMEN AND PELVIS WITH IV CONTRAST CLINICAL HISTORY: Bilateral lower quadrant tenderness with guarding. TECHNIQUE: CT of the abdomen and pelvis was performed using standard technique, scanning from just above the dome of the diaphragm to the symphysis pubis. All CT scans at this facility use dose modulation, iterative reconstruction, and/or weight based dosing when appropriate to reduce radiation dose to as low as reasonably achievable. Contrast: IV:  100 ml of Isovue-370 COMPARISON: None. RESULT: Liver: No mass. Normal hepatic morphology. Biliary: No bile duct dilation. Gallbladder is unremarkable. Spleen: No mass. No splenomegaly. Pancreas: No mass or duct dilation. Adrenals: No mass. Kidneys: Subcentimeter lesions that are too small to characterize but likely benign. No hydronephrosis. No renal calculi. GI tract: There is extensive diverticulosis with bowel wall thickening and surrounding mesenteric soft tissue stranding along the rectosigmoid region, suspicious for acute diverticulitis. Moderate amount of colonic stool. No bowel dilation. Appendix is unremarkable. Lymph nodes: No abdominal or pelvic lymphadenopathy. Mesentery/Peritoneum: Mild soft tissue stranding about the rectosigmoid. Retroperitoneum: No mass. Vasculature: The celiac axis and SMA are patent. The portal vein and branches, splenic vein, SMV, and hepatic veins are patent.   Abdominal aortic atherosclerotic disease ectasia of the abdominal aorta up to 3.1 cm. . Pelvis: Limited evaluation of the pelvis due to streak artifact from hip replacement prosthesis. Bones/Soft Tissues: Grade 1 anterolisthesis of L4 on L5. Right L4-L5 and L5-S1 pars defects. Lower thorax: Unremarkable. 1.  Suspected acute diverticulitis along the rectosigmoid region with adjacent soft tissue stranding. No abscess or drainable fluid collection. 2.  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 Assessment and Plan     81-year-old female with chronic respiratory failure with hypoxia on 2-3 L O2, history of TIA, hypertension, recent hip replacement presents to the ED from her PCPs office with 2-week history of abdominal pain and soft blood pressure. 1.  Acute diverticulitis: First episode  -IV antibiotic, IV fluid, clears, pain control  -Possible advance diet on 7/14  -follow-up with GI as outpatient in several weeks for colonoscopy    2. Hypotension reported PCPs office: Stable vitals in ED. Continue to monitor. Hold antihypertensive agents    3. Anemia: Check iron studies. Recommend eventual colonoscopy as outpatient    COPD/chronic respiratory failure with hypoxia (FEV1 58%)  Memory disorder /vascular dementia on Aricept  History of TIA: Patient has discontinued aspirin on her own. She was educated on its use for secondary prevention    lovenox ppx  Full code    45 minutes in total care time.      Arin Norwood DO  Admitting Hospitalist

## 2021-07-14 NOTE — PROGRESS NOTES
Pt awake in bed with eyes closed. Pt denies pain at this time. Pt denies any needs at the present time. Will continue to monitor.

## 2021-07-14 NOTE — PROGRESS NOTES
[facility] EMERGENCY [facility] AT [place] Respiratory Therapy Evaluation   Current Order:  ALBUTEROL HFA Q4PRN      Home Regimen: PRN     Ordering Physician: NIKA  Re-evaluation Date: 7/17    Diagnosis:  ACUTE DIVERTICULITIS    Patient Status: Stable / Unstable + Physician notified    The following MDI Criteria must be met in order to convert aerosol to MDI with spacer. If unable to meet, MDI will be converted to aerosol:  []  Patient able to demonstrate the ability to use MDI effectively  []  Patient alert and cooperative  []  Patient able to take deep breath with 5-10 second hold  []  Medication(s) available in this delivery method   []  Peak flow greater than or equal to 200 ml/min            Current Order Substituted To  (same drug, same frequency)   Aerosol to MDI [] Albuterol Sulfate 0.083% unit dose by aerosol Albuterol Sulfate MDI 2 puffs by inhalation with spacer    [] Levalbuterol 1.25 mg unit dose by aerosol Levalbuterol MDI 2 puffs by inhalation with spacer    [] Levalbuterol 0.63 mg unit dose by aerosol Levalbuterol MDI 2 puffs by inhalation with spacer    [] Ipratropium Bromide 0.02% unit dose by aerosol Ipratropium Bromide MDI 2 puffs by inhalation with spacer    [] Duoneb (Ipratropium + Albuterol) unit dose by aerosol Ipratropium MDI + Albuterol MDI 2 puffs by inhalation w/spacer   MDI to Aerosol [] Albuterol Sulfate MDI Albuterol Sulfate 0.083% unit dose by aerosol    [] Levalbuterol MDI 2 puffs by inhalation Levalbuterol 1.25 mg unit dose by aerosol    [] Ipratropium Bromide MDI by inhalation Ipratropium Bromide 0.02% unit dose by aerosol    [] Combivent (Ipratropium + Albuterol) MDI by inhalation Duoneb (Ipratropium + Albuterol) unit dose by aerosol       Treatment Assessment [Frequency/Schedule]:  Change frequency to:NO CHANGE __________________________________________________per Protocol, P&T, MEC      Points 0 1 2 3 4   Pulmonary Status  Non-Smoker  []   Smoking history   < 20 pack years  []   Smoking history  ?  21 pack years  []   Pulmonary Disorder  (acute or chronic)  [x]   Severe or Chronic w/ Exacerbation  []     Surgical Status No [x]   Surgeries     General []   Surgery Lower []   Abdominal Thoracic or []   Upper Abdominal Thoracic with  PulmonaryDisorder  []     Chest X-ray Clear/Not  Ordered     [x]  Chronic Changes  Results Pending  []  Infiltrates, atelectasis, pleural effusion, or edema  []  Infiltrates in more than one lobe []  Infiltrate + Atelectasis, &/or pleural effusion  []    Respiratory Pattern Regular,  RR = 12-20 [x]  Increased,  RR = 21-25 []  BYRNE, irregular,  or RR = 26-30 []  Decreased FEV1  or RR = 31-35 []  Severe SOB, use  of accessory muscles, or RR ? 35  []    Mental Status Alert, oriented,  Cooperative [x]  Confused but Follows commands []  Lethargic or unable to follow commands []  Obtunded  []  Comatose  []    Breath Sounds Clear to  auscultation  [x]  Decreased unilaterally or  in bases only []  Decreased  bilaterally  []  Crackles or intermittent wheezes []  Wheezes []    Cough Strong, Spontan., & nonproductive [x]  Strong,  spontaneous, &  productive []  Weak,  Nonproductive []  Weak, productive or  with wheezes []  No spontaneous  cough or may require suctioning []    Level of Activity Ambulatory [x]  Ambulatory w/ Assist  []  Non-ambulatory []  Paraplegic []  Quadriplegic []    Total    Score:_3______     Triage Score:___5_____      Tri       Triage:     1. (>20) Freq: Q3    2. (16-20) Freq: Q4   3. (11-15) Freq: QID & Albuterol Q2 PRN    4. (6-10) Freq: TID & Albuterol Q2 PRN    5. (0-5) Freq Q4prn

## 2021-07-14 NOTE — PROGRESS NOTES
4103: no issues noted. Vitals stable, tolerating CL diet. NO liquid stool last bm 7/13 soft. No c/o pain, no SOB. Pt does have steri strips to left hip incision with silvercell dressing to anterior portion of incision she stated to be changed at her f/u appointment next week with her orthpod. Dressing is clean,d ry and intect. No odor, no drainage, no discoloration.

## 2021-07-15 NOTE — PLAN OF CARE
Problem: Falls - Risk of:  Goal: Will remain free from falls  Description: Will remain free from falls  7/14/2021 2348 by Favio Nunez RN  Outcome: Ongoing  7/14/2021 1038 by Norma Johnson RN  Outcome: Ongoing  Goal: Absence of physical injury  Description: Absence of physical injury  7/14/2021 2348 by Favio Nunez RN  Outcome: Ongoing  7/14/2021 1038 by Norma Johnson RN  Outcome: Ongoing

## 2021-07-15 NOTE — PROGRESS NOTES
CLINICAL PHARMACY NOTE: MEDS TO BEDS    Total # of Prescriptions Filled: 1   The following medications were delivered to the patient:  · Amox/clav 875/125mg tab    Additional Documentation:

## 2021-07-15 NOTE — PROGRESS NOTES
Assessment completed this shift. Alert and oriented x4. Denies pain or nausea. BS active x4 quadrants. Tolerated breakfast.Up with standby assist. In bed with no other needs at present.   Electronically signed by Steve Reese RN on 7/15/2021 at 11:06 AM

## 2021-07-15 NOTE — PROGRESS NOTES
Physician Progress Note    7/15/2021   3:49 PM    Name:  Keren Espino  MRN:    [id number]     IP Day: 2     Admit Date: 7/13/2021 11:00 AM  PCP: Nydia Presley MD    Code Status:  Full Code    Subjective:     She says her abdominal pain is improved but earlier today it was bothering her. Per RN, she tolerated solid breakfast without complication.     Current Facility-Administered Medications   Medication Dose Route Frequency Provider Last Rate Last Admin    lactated ringers infusion   Intravenous Continuous [place] Haff, DO        sodium chloride flush 0.9 % injection 5-40 mL  5-40 mL Intravenous 2 times per day Jeremy Haff, DO   10 mL at 07/15/21 1059    sodium chloride flush 0.9 % injection 5-40 mL  5-40 mL Intravenous PRN Jeremy Haff, DO   10 mL at 07/13/21 1734    0.9 % sodium chloride infusion  25 mL Intravenous PRN Jeremy Haff, DO        enoxaparin (LOVENOX) injection 40 mg  40 mg Subcutaneous Daily Jeremy Haff, DO   40 mg at 07/15/21 0848    ondansetron (ZOFRAN-ODT) disintegrating tablet 4 mg  4 mg Oral Q8H PRN Jeremy Haff, DO        Or    ondansetron [facility]) injection 4 mg  4 mg Intravenous Q6H PRN Jeremy Haff, DO        polyethylene glycol (GLYCOLAX) packet 17 g  17 g Oral Daily PRN Lewiston Haff, DO        acetaminophen (TYLENOL) tablet 650 mg  650 mg Oral Q6H PRN Jeremy Haff, DO        Or    acetaminophen (TYLENOL) suppository 650 mg  650 mg Rectal Q6H PRN Jeremy Haff, DO        piperacillin-tazobactam (ZOSYN) 3,375 mg in dextrose 5 % 50 mL IVPB extended infusion (mini-bag)  3,375 mg Intravenous Q8H Jeremy Haff, DO   Stopped at 07/15/21 1338    lactobacillus acidophilus [facility]) 4 tablet  4 tablet Oral TID Jeremy Haff, DO   4 tablet at 07/15/21 0847    traMADol (ULTRAM) tablet 50 mg  50 mg Oral Q6H PRN Lewiston Haff, DO        ketorolac (TORADOL) injection 15 mg  15 mg Intravenous Q6H PRN Jeremy Haff, DO        albuterol sulfate  (90 Base) MCG/ACT inhaler 2 puff  2 puff Inhalation Q4H PRN Jillyn [place], DO        atorvastatin (LIPITOR) tablet 20 mg  20 mg Oral Daily Jillyn [place], DO   20 mg at 21    donepezil (ARICEPT) tablet 5 mg  5 mg Oral Nightly Jillyn [place], DO   5 mg at 21    escitalopram (LEXAPRO) tablet 10 mg  10 mg Oral Daily Jillyn [place], DO   10 mg at 07/15/21 0848    glycopyrrolate-formoterol (BEVESPI) 9-4.8 MCG/ACT inhaler 2 puff  2 puff Inhalation BID Jillyn [place], DO   2 puff at 07/15/21 9204    vitamin B-12 (CYANOCOBALAMIN) tablet 500 mcg  500 mcg Oral Daily Jillyn [place], DO   500 mcg at 07/15/21 0847    vitamin D (CHOLECALCIFEROL) tablet 1,000 Units  1,000 Int'l Units Oral Daily Jillyn [place], DO   1,000 Units at 07/15/21 0848       Physical Examination:      Vitals:  /62   Pulse 78   Temp 97.9 °F (36.6 °C) (Oral)   Resp 18   Ht 5' (1.524 m)   Wt 158 lb (71.7 kg)   LMP  (LMP Unknown)   SpO2 92%   BMI 30.86 kg/m²   Temp (24hrs), Av.3 °F (36.8 °C), Min:97.9 °F (36.6 °C), Max:98.6 °F (37 °C)      General appearance: alert, cooperative and no distress  Mental Status: oriented to person, place and time and normal affect  Lungs: clear to auscultation bilaterally, normal effort  Heart: regular rate and rhythm, no murmur  Abdomen: TTP in left lower quadrant. Otherwise soft and nontender. No guarding or rebound tenderness  Extremities: no edema, redness, tenderness in the calves.  Cap refill <2s  Skin: no gross lesions, rashes    Data:     Labs:  Recent Labs     21  0648 07/15/21  0708   WBC 7.2 5.1   HGB 7.8* 7.9*   * 490*     Recent Labs     21  0648 07/15/21  0708    145*   K 3.9 3.8   * 110*   CO2 28 29   BUN 14 6*   CREATININE 0.55 0.45*   GLUCOSE 117* 92     Recent Labs     21  1115   AST 18   ALT 8   BILITOT 0.5   ALKPHOS 146*       Assessment and Plan:        70-year-old female with chronic respiratory failure with hypoxia on 2-3 L O2, history of TIA, hypertension, recent hip replacement presents to the ED from her PCPs office with 2-week history of abdominal pain and soft blood pressure.     1. Acute diverticulitis: First episode: Improving. Patient and son feel like it is possible discharge home today but because she is still having some abdominal pain, would prefer 1 more day in the hospital  -IV antibiotic, IV fluid, clears, pain control  -Continue clears and gentle IVF today  -Aim to advance diet with breakfast 7/16 and discharged home  -follow-up with GI as outpatient in several weeks for colonoscopy     2. Hypotension reported PCPs office: Stable vitals since admission. No hypertension. Can discontinue amlodipine at discharge     3. Anemia with iron studies more consistent with anemia of chronic disease. Recommend eventual colonoscopy as outpatient. Monitor H&H. S/p IV iron     COPD/chronic respiratory failure with hypoxia (FEV1 58%)  Memory disorder /vascular dementia on Aricept  History of TIA: Patient has discontinued aspirin on her own.   She was educated on its use for secondary prevention     lovenox ppx  Full code    Son updated at bedside    Anticipate discharge home on 7/16     >35 minutes in total care time    Electronically signed by Nahomy Jasmine DO on 7/15/2021 at 3:49 PM

## 2021-07-15 NOTE — PROGRESS NOTES
Pt shift assessment completed. Pt denies pain, N/V, vitals are stable. Pt has no requests at this time. Pt has been up multiple times to urinate. Fall precautions are in place. Will continue to monitor.      Electronically signed by Peter Luke RN on 7/14/2021 at 11:47 PM

## 2021-07-16 NOTE — PROGRESS NOTES
Assessment completed this shift. Alert and oriented x4. Up with standby assist and walker. Incision to left healing with no redness or swelling noted. Denies nausea. Ate about 50% of regular breakfast.Denies pain, but reports slight tenderness to left lower abdomen \"if you press really hard on it\". Call light in reach.   Electronically signed by Damián Tompkins RN on 7/16/2021 at 8:54 AM

## 2021-07-16 NOTE — TELEPHONE ENCOUNTER
Patients son called in and said his mom was discharged from hospital today. He stated they have changed patients medications around. The one he is concerned about is the amlodipine being stopped. Also the calcium carbonate and vit c were stopped. They also put his mom on albuterol 2 puffs before breakfast when before she was only taking this as needed. Also her aspirin has been upped to 2xs a day for her hip and she was placed on amoxicillin. Overall he is wondering how do you feel about these changes.     Please advise and thank you

## 2021-07-16 NOTE — DISCHARGE SUMMARY
[facility] Medicine Discharge Summary    Bhavya Bey  :  1940  MRN:  [id number]    Admit date:  2021  Discharge date:  2021    Admitting Physician:  Mansoor Malhotra DO  Primary Care Physician:  Augustin Caban MD    Discharge Diagnoses: Active Problems:    Anemia    Acute diverticulitis  Resolved Problems:    * No resolved hospital problems. *    Chief Complaint   Patient presents with    Abdominal Pain    Hypotension       Condition: improved   Activity: no restrictions   Diet: regular  Disposition: home  Functional Status: ambulatory    Significant Findings:     Recent Labs     21  0649 07/15/21  0708 21  0648   WBC 4.5* 5.1 7.2   HGB 8.1* 7.9* 7.8*   HCT 25.5* 23.8* 24.3*   MCV 93.2 93.1 92.8   * 490* 509*     Labs Renal Latest Ref Rng & Units 2021 7/15/2021 2021 2021 2021   BUN 8 - 23 mg/dL 5(L) 6(L) 14 17 27(H)   Cr 0.50 - 0.90 mg/dL 0.53 0.45(L) 0.55 0.84 1.03(H)   K 3.4 - 4.9 mEq/L 3.6 3.8 3.9 3.5 4.3   Na 135 - 144 mEq/L 146(H) 145(H) 143 141 140     CT Abd/Pelvis:  1.  Suspected acute diverticulitis along the rectosigmoid region with adjacent soft tissue stranding. No abscess or drainable fluid collection. 2.  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 Hospital Course:   44-year-old female with chronic respiratory failure with hypoxia on 2-3 L O2, history of TIA, hypertension, vascular dementia, recent hip replacement presents to the ED from her PCPs office with 2-week history of abdominal pain and soft blood pressure. She was found to have acute diverticulitis and was treated conservatively with antibiotics, IV fluid, and bowel rest.  She ultimately improved and was tolerating p.o. without complication at the time of discharge. She will complete a course of p.o. Augmentin at discharge.   She will follow-up with gastroenterology for eventual colonoscopy for evaluation of diverticular disease, exclusion of malignancy, and work-up of anemia. Iron studies were most suggestive of anemia of chronic disease. She did receive IV iron infusion while inpatient. No bleeding was identified and hemoglobin remained stable. The patient was noted to be hypotensive at her PCPs office and therefore her home amlodipine was held during the hospitalization-her blood pressure remained on average in the 120s and 130s over 60s during the hospitalization and therefore her amlodipine was discontinued at discharge. Exam on Discharge:   /83   Pulse 69   Temp 98.1 °F (36.7 °C) (Oral)   Resp 20   Ht 5' (1.524 m)   Wt 158 lb (71.7 kg)   LMP  (LMP Unknown)   SpO2 91%   BMI 30.86 kg/m²   General appearance: alert, cooperative and no distress.  elderly  Mental Status: oriented to person, place and time and normal affect  Lungs: clear to auscultation bilaterally, normal effort  Heart: regular rate and rhythm, no murmur  Abdomen: soft, nontender, nondistended, bowel sounds present, no masses  Extremities: no edema, redness, tenderness in the calves  Skin: no gross lesions, rashes    Discharge Medication List:   [facility]   Home Medication Instructions GDQ:[id number]    Printed on:07/16/21 2025   Medication Information                      albuterol sulfate  (90 Base) MCG/ACT inhaler  Inhale 2 puffs into the lungs every morning (before breakfast)             alendronate (FOSAMAX) 70 MG tablet  Take 1 tablet by mouth every 7 days             amoxicillin-clavulanate (AUGMENTIN) 875-125 MG per tablet  Take 1 tablet by mouth 2 times daily for 8 days             aspirin 81 MG EC tablet  Take 1 tablet by mouth daily             atorvastatin (LIPITOR) 20 MG tablet  Take 1 tablet by mouth daily             coenzyme Q10 100 MG CAPS capsule  Take 100 mg by mouth 2 times daily              donepezil (ARICEPT) 5 MG tablet  Take 1 tablet by mouth nightly             escitalopram (LEXAPRO) 10 MG tablet  Take 1 tablet by mouth daily             fluticasone (FLONASE) 50 MCG/ACT nasal spray  USE 2 SPRAYS NASALLY DAILY             Multiple Vitamins-Minerals (PRESERVISION AREDS 2) CAPS  Take 1 capsule by mouth daily             OXYGEN  Start oxygen therapy at 2 lit with activity and sleep , give POC for ambulation     Dx COPD             umeclidinium-vilanterol (ANORO ELLIPTA) 62.5-25 MCG/INH AEPB inhaler  Inhale 1 puff into the lungs daily             vitamin B-12 (CYANOCOBALAMIN) 500 MCG tablet  Take 500 mcg by mouth daily             vitamin D (CHOLECALCIFEROL) 1000 UNIT TABS tablet  Take 1,000 Int'l Units by mouth daily.                    DC time 37 minutes    Signed:  Sukumar Birch DO  7/16/2021, 3:21 PM

## 2021-07-16 NOTE — CARE COORDINATION
PATIENT BEING DISCHARGED HOME TODAY ON PO ATB PATIENT HAS ORDERS ON CHART TO RESUME OUTPT PT/OT AS PRIOR W/ [facility]. PATIENT DENIES ANY OTHER HOME GOING NEEDS.

## 2021-07-16 NOTE — PROGRESS NOTES
Reviewed d/c instructions with patient. Verbalized understanding. Waiting for son to transport home.  Electronically signed by Tara Mar RN on 7/16/2021 at 12:12 PM

## 2021-07-19 NOTE — TELEPHONE ENCOUNTER
I am glad she is doing better. Regarding amlodipine - we will have to follow her BP at home and at the office to see where it goes. Her BP was too low and it should have been stopped at least temporarily. Regarding calcium and vitamin C - no reason not to continue it. Go ahead and restart.

## 2021-07-19 NOTE — CARE COORDINATION
Adrianna 45 Transitions Initial Follow Up Call    Call within 2 business days of discharge: Yes    Patient: Kristan Betancur Patient : 1940   MRN: [id number]  Reason for Admission: 2021 - 2021 Acute diverticulitis, Anemia, s/p recent LEFT HIP DIRECT ANTERIOR HIP REPLACEMENT. Discharge Date: 21 RARS: Readmission Risk Score: [address]  CT    Last Discharge [facility]       Complaint Diagnosis Description Type Department Provider    21 Abdominal Pain; Hypotension Acute diverticulitis . .. ED to Hosp-Admission (Discharged) (ADMITTED) MLOZ [facility] OLIVIA Donna Regan DO; Vanessa English Emerita. .. Initial CT Outreach attempt. Left Hippa compliant VM w/ appt reminders. M OP Therapy    PCP  3:34  Dr Carmela Buchanan  11:00 w/ Kiara Mack.        Care Transitions 24 Hour Call    Care Transitions Interventions         Follow Up  Future Appointments   Date Time Provider Lily Mathis   2021  3:45 PM Chantal Campbell MD [facility]   2021 11:00 AM Claire Bunch APRN - CNP Ayesha More [facility]   2021 11:30 AM Chantal Campbell MD [facility]   2021  9:00 AM Suzanne Angeles ULTRASOUND 2 [facility]Z [facility] RAD   10/21/2021  3:30 PM Olivia Mc MD [address]   2021  3:30 PM Aguila Lake MD [address]

## 2021-07-20 PROBLEM — R07.9 CHEST PAIN: Status: ACTIVE | Noted: 2021-01-01

## 2021-07-20 NOTE — PROGRESS NOTES
20 MG tablet  Take 1 tablet by mouth daily             coenzyme Q10 100 MG CAPS capsule  Take 100 mg by mouth 2 times daily              donepezil (ARICEPT) 5 MG tablet  Take 1 tablet by mouth nightly             escitalopram (LEXAPRO) 10 MG tablet  Take 1 tablet by mouth daily             fluticasone (FLONASE) 50 MCG/ACT nasal spray  USE 2 SPRAYS NASALLY DAILY             metroNIDAZOLE (FLAGYL) 500 MG tablet  Take 1 tablet by mouth 3 times daily for 10 days             Multiple Vitamins-Minerals (PRESERVISION AREDS 2) CAPS  Take 1 capsule by mouth daily             OXYGEN  Start oxygen therapy at 2 lit with activity and sleep , give POC for ambulation     Dx COPD             umeclidinium-vilanterol (ANORO ELLIPTA) 62.5-25 MCG/INH AEPB inhaler  Inhale 1 puff into the lungs daily             vitamin B-12 (CYANOCOBALAMIN) 500 MCG tablet  Take 500 mcg by mouth daily             vitamin D (CHOLECALCIFEROL) 1000 UNIT TABS tablet  Take 1,000 Int'l Units by mouth daily.                      Medications marked \"taking\" at this time  Outpatient Medications Marked as Taking for the 7/20/21 encounter (Office Visit) with Jason Bolivar MD   Medication Sig Dispense Refill    metroNIDAZOLE (FLAGYL) 500 MG tablet Take 1 tablet by mouth 3 times daily for 10 days 30 tablet 0    aspirin 81 MG EC tablet Take 1 tablet by mouth daily 30 tablet 0    amoxicillin-clavulanate (AUGMENTIN) 875-125 MG per tablet Take 1 tablet by mouth 2 times daily for 8 days 16 tablet 0    vitamin B-12 (CYANOCOBALAMIN) 500 MCG tablet Take 500 mcg by mouth daily      fluticasone (FLONASE) 50 MCG/ACT nasal spray USE 2 SPRAYS NASALLY DAILY 16 g 5    donepezil (ARICEPT) 5 MG tablet Take 1 tablet by mouth nightly 90 tablet 4    umeclidinium-vilanterol (ANORO ELLIPTA) 62.5-25 MCG/INH AEPB inhaler Inhale 1 puff into the lungs daily 90 puff 1    alendronate (FOSAMAX) 70 MG tablet Take 1 tablet by mouth every 7 days 12 tablet 4    atorvastatin (LIPITOR) 20 MG tablet Take 1 tablet by mouth daily 90 tablet 4    escitalopram (LEXAPRO) 10 MG tablet Take 1 tablet by mouth daily 90 tablet 1    albuterol sulfate  (90 Base) MCG/ACT inhaler Inhale 2 puffs into the lungs every morning (before breakfast) (Patient taking differently: Inhale 2 puffs into the lungs every 4 hours as needed ) 1 Inhaler 3    OXYGEN Start oxygen therapy at 2 lit with activity and sleep , give POC for ambulation     Dx COPD 1 Units 0    Multiple Vitamins-Minerals (PRESERVISION AREDS 2) CAPS Take 1 capsule by mouth daily 90 capsule 3    coenzyme Q10 100 MG CAPS capsule Take 100 mg by mouth 2 times daily       vitamin D (CHOLECALCIFEROL) 1000 UNIT TABS tablet Take 1,000 Int'l Units by mouth daily. Medications patient taking as of now reconciled against medications ordered at time of hospital discharge: Yes    Chief Complaint   Patient presents with    Follow-Up from [address] [place] [address] d/c 7/15/2021    Other     has not been eating a lot or drinking water     Abdominal Pain     lower part of abdomen        HPI    Inpatient course: Discharge summary reviewed- see chart. D/C SUmmary as follows:    Signed             Show:Clear all  [x]Manual[x]Template[x]Copied    Added by:  [x]Sergo Li DO    []Fortunato for details  [facility] Medicine Discharge Summary     Heather Betancourt                      :  1940                     MRN:  [id number]     Admit date:  2021                      Discharge date:  2021     Admitting Physician:  Annamarie Schneider DO  Primary Care Physician:  Marina Green MD     Discharge Diagnoses: Active Problems:    Anemia    Acute diverticulitis  Resolved Problems:    * No resolved hospital problems.  *         Chief Complaint   Patient presents with    Abdominal Pain    Hypotension         Condition: improved   Activity: no restrictions   Diet: regular  Disposition: home  Functional Status: ambulatory     Significant Findings:            Recent Labs     07/16/21  0649 07/15/21  0708 07/14/21  0648   WBC 4.5* 5.1 7.2   HGB 8.1* 7.9* 7.8*   HCT 25.5* 23.8* 24.3*   MCV 93.2 93.1 92.8   * 490* 509*      Labs Renal Latest Ref Rng & Units 7/16/2021 7/15/2021 7/14/2021 7/13/2021 6/26/2021   BUN 8 - 23 mg/dL 5(L) 6(L) 14 17 27(H)   Cr 0.50 - 0.90 mg/dL 0.53 0.45(L) 0.55 0.84 1.03(H)   K 3.4 - 4.9 mEq/L 3.6 3.8 3.9 3.5 4.3   Na 135 - 144 mEq/L 146(H) 145(H) 143 141 140      CT Abd/Pelvis:  1.  Suspected acute diverticulitis along the rectosigmoid region with adjacent soft tissue stranding. No abscess or drainable fluid collection. 2.  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            Hospital Course:   72-year-old female with chronic respiratory failure with hypoxia on 2-3 L O2, history of TIA, hypertension, vascular dementia, recent hip replacement presents to the ED from her PCPs office with 2-week history of abdominal pain and soft blood pressure. She was found to have acute diverticulitis and was treated conservatively with antibiotics, IV fluid, and bowel rest.  She ultimately improved and was tolerating p.o. without complication at the time of discharge. She will complete a course of p.o. Augmentin at discharge. She will follow-up with gastroenterology for eventual colonoscopy for evaluation of diverticular disease, exclusion of malignancy, and work-up of anemia.     Iron studies were most suggestive of anemia of chronic disease. She did receive IV iron infusion while inpatient. No bleeding was identified and hemoglobin remained stable.     The patient was noted to be hypotensive at her PCPs office and therefore her home amlodipine was held during the hospitalization-her blood pressure remained on average in the 120s and 130s over 60s during the hospitalization and therefore her amlodipine was discontinued at discharge.                Interval history/Current status: Is still not eating or drinking well. Appetite was very low but is improving. Has to be reminded to eat. Has protein drinks but is not drinking them. Has a half piece of toast this AM and had had taco salad last night and half of a tomato sandwich for lunch yesterday. Continues to have low abdominal pain. Is taking abx. Fatigues easily. Feels really cold. Doesn't want to get out of bed. Lies in bed more than anything - a new behavior. Getting her was a chore because she seems to be getting out of shape. Review of Systems No fevers, chills, sweats. No unintended weight loss. No  nausea, vomiting, diarrhea, constipation, bloody stools, black tarry stools. No rashes. No swollen glands. Complains of increased runny nose beyond drippy baseline. No sneezing. Vitals:    07/20/21 0956 07/20/21 1126   BP: 108/60 80/60   Site: Left Upper Arm Right Upper Arm   Position: Sitting    Cuff Size: Medium Adult    Pulse: 91 100   Resp: 18    Temp: 95.6 °F (35.3 °C)    TempSrc: Temporal    SpO2: 97%    Weight: 154 lb 9.6 oz (70.1 kg)    Height: 5' (1.524 m)      Body mass index is 30.19 kg/m². Wt Readings from Last 3 Encounters:   07/20/21 154 lb 9.6 oz (70.1 kg)   07/13/21 158 lb (71.7 kg)   07/13/21 155 lb 9.6 oz (70.6 kg)     BP Readings from Last 3 Encounters:   07/20/21 80/60   07/16/21 125/83   07/13/21 (!) 80/50       Physical Exam  Constitutional:       General: She is in acute distress ( mild, seemed to become progressively more tired and weak during visit). Appearance: She is well-developed. She is ill-appearing. HENT:      Head: Normocephalic and atraumatic. Eyes:      General: No scleral icterus. Conjunctiva/sclera: Conjunctivae normal.   Cardiovascular:      Rate and Rhythm: Normal rate and regular rhythm. Heart sounds: Normal heart sounds. Pulmonary:      Effort: No respiratory distress. Breath sounds: No wheezing or rales. Abdominal:      Palpations: Abdomen is soft. Tenderness:  There is abdominal tenderness (LLQ). There is rebound. There is no guarding. Skin:     General: Skin is warm and dry. Coloration: Skin is pale ( though improving). Neurological:      Mental Status: She is alert and oriented to person, place, and time. Psychiatric:      Comments: Patient extremely fatigued, irritable, low mood             Assessment/Plan:  Atul Perez was seen today for follow-up from hospital, other and abdominal pain. Diagnoses and all orders for this visit:    Hypotension, unspecified hypotension type  Comments:  infection v. dehydration v. anemia. To ER for further evaluation and management. Taken by MA in W/C? Ondina Reynoso Son accompanies. Both understand POC. Orders:  -     NC DISCHARGE MEDS RECONCILED W/ CURRENT OUTPATIENT MED LIST    Acute diverticulitis  Comments:  Added metronidazole to Augmentin. May need IV abx. To ER. Orders:  -     NC DISCHARGE MEDS RECONCILED W/ CURRENT OUTPATIENT MED LIST  -     metroNIDAZOLE (FLAGYL) 500 MG tablet; Take 1 tablet by mouth 3 times daily for 10 days  -     William Silva MD, Gastroenterology, [place]    Anemia, unspecified type  Comments: Follow labs. See above. Orders:  -     NC DISCHARGE MEDS RECONCILED W/ CURRENT OUTPATIENT MED LIST  -     CBC With Auto Differential; Future    Essential (primary) hypertension  Comments:  Off amlodipine. Orders:  -     Basic Metabolic Panel;  Future            Medical Decision Making: high complexity

## 2021-07-20 NOTE — ED NOTES
Hospitalist at [address] [facility].  [place], [address] [place] [address]  07/20/21 3402

## 2021-07-20 NOTE — ED TRIAGE NOTES
Pt has co hypotension. Pt states she was at her primary Dr office and they sent her in. Pt is aox4 pwd.

## 2021-07-20 NOTE — ED NOTES
Report given to [facility].  John Malhotra, [facility]  07/20/21 2323

## 2021-07-20 NOTE — CARE COORDINATION
[facility] Case Management   Initial Discharge Assessment     Met with patient and her son Shayan Cortes at bedside in ER to discuss patient's baseline status, available resources, and discharge plan.       PCP: Bin Martinez MD                                  Date of Last Visit: Today - 7/20/21  If no PCP, list provided? N/A     Discharge Planning     Living Arrangements: Patient lives independently at home.     Who do you live with? Son Shayan Cotres ([phone]) and daughter-in-law Marlen Quintero ([phone]).    Who helps you with your care: Patient is independent at baseline but family is able to assist as needed. Patient has four sons who all are able to assist with her care PRN.    If lives at home: Do you have any barriers navigating in your home? No     Patient can perform ADL? Yes     Current Services (outpatient and in home): Patient had previously participated in outpatient PT/OT ([address] [place]) and [facility] for PT/OT. She has no current HHC or OP services.     Dialysis: No     Is transportation available to get to your appointments? Yes - Patient's sons take turns providing transportation.     DME Equipment: Patient has the following DME available: cane, walker, wheelchair, shower seat, grab bars, raised toilet seat     Respiratory equipment: O2 at [facility] and at .     Respiratory provider: [address] [facility] [address]: Julien Dowd on [address] + mail order [facility] for long-term meds.      Consult with Medication Assistance Program?  No       Patient agreeable to [address]? Yes, GEORGE [address] at [facility] or [facility]     Patient agreeable to SNF/Rehab? No - Not needed per patient.     Other discharge needs identified? Other - TBD after work-up. Patient has very good family support.     Does Patient Have a High-Risk for Readmission Diagnosis (CHF, PN, MI, COPD)?  Yes     · History: COPD on home O2, BYRNE, vascular dementia, IBS, hypotension.     Initial Discharge Plan? (Note: please see concurrent daily documentation for any updates after initial note).  Home with family and PT/OT if needed (home care vs outpatient therapy).     Readmission Risk              Risk of Unplanned Readmission:  0         Electronically signed by Carlitos Smith RN on 7/20/2021 at 3:18 PM

## 2021-07-20 NOTE — Clinical Note
Patient Class: Observation [104]   REQUIRED: Diagnosis: Chest pain [914293]   Estimated Length of Stay: Estimated stay of less than 2 midnights   Admitting Provider: Sravanthi Garcia [[id number]]   Telemetry/Cardiac Monitoring Required?: Yes

## 2021-07-20 NOTE — H&P
Hospital Medicine  History and Physical    Patient:  Rose Jackson  MRN: [id number]    CHIEF COMPLAINT:    Chief Complaint   Patient presents with    Hypotension       History Obtained From:  patient, electronic medical record  Primary Care Physician: Waqas Herbert MD    HISTORY OF PRESENT ILLNESS:   The patient is a 80 y.o. female who presents with hypotension from her PCP office. Patient is a 61-year-old female with a history of vascular dementia tobacco abuse hypertension and COPD chronically on supplemental O2 was most recently discharged 6/26 following treatment for diverticulitis and hypotension. Patient to follow-up with PCP today for she has found to be hypotensive referred to the ED. On arrival she was given a liter of fluid with resolution of her hypotension. Initial work-up showed a troponin of 0.022 with normal sinus rhythm on EKG and no chest pain. Subsequent troponin is 0.012. Patient's basic metabolic panel is otherwise within normal limits LFTs are okay and CBC is benign. UA is negative. Patient has some degree of dementia at baseline per son but the son also notes that she has had progressively worsening oral intake over the past week or. So, patient drinks approximately 1 to 2-16 ounce bottles of water daily. Patient has had no fevers chills chest pain palpitations nausea vomiting diarrhea or constipation.   Patient lives at home with her son and has had no difficulties with her ADLs    Past Medical History:      Diagnosis Date    COPD (chronic obstructive pulmonary disease) ([address]     Dyspnea on exertion 7/31/2019    Hypertension     Hypotension 7/13/2021    Impaired cognition     Irritable bowel syndrome without diarrhea 12/22/2020    Mild cognitive impairment with memory loss 9/25/2020    Osteoarthritis     Tobacco abuse 5/6/2019    Tobacco use disorder 5/6/2019    Vascular dementia ([address] 6/22/2020       Past Surgical History:      Procedure Laterality Date    EYE SURGERY  01/01/2010    PER PROBLEM SHEET    HYSTERECTOMY  01/01/1975    PER PROBLEM SHEET    LITHOTRIPSY  01/01/2004    PER PROBLEM SHEET    TOTAL HIP ARTHROPLASTY Right 2019    TOTAL HIP ARTHROPLASTY Left 6/25/2021    LEFT HIP DIRECT ANTERIOR HIP REPLACEMENT performed by Juan Carlos Mayorga MD at [facility]  01/01/1988    PER PROBLEM SHEET       Medications Prior to Admission:    Prior to Admission medications    Medication Sig Start Date End Date Taking?  Authorizing Provider   aspirin 81 MG EC tablet Take 1 tablet by mouth daily 7/15/21  Yes Juliann Rodríguez DO   amoxicillin-clavulanate (AUGMENTIN) 875-125 MG per tablet Take 1 tablet by mouth 2 times daily for 8 days  Patient taking differently: Take 1 tablet by mouth 2 times daily Was started Friday night at 6pm 7/15/21 7/23/21 Yes Juliann Rodrgíuez DO   vitamin B-12 (CYANOCOBALAMIN) 500 MCG tablet Take 500 mcg by mouth daily   Yes Historical Provider, MD   fluticasone (FLONASE) 50 MCG/ACT nasal spray USE 2 SPRAYS NASALLY DAILY 6/28/21  Yes Deepti Jordan MD   donepezil (ARICEPT) 5 MG tablet Take 1 tablet by mouth nightly 6/8/21 7/20/21 Yes Aguila Carrero MD   umeclidinium-vilanterol (ANORO ELLIPTA) 62.5-25 MCG/INH AEPB inhaler Inhale 1 puff into the lungs daily 5/26/21  Yes Deepti Jordan MD   alendronate (FOSAMAX) 70 MG tablet Take 1 tablet by mouth every 7 days 2/24/21  Yes Dayana Antonio MD   atorvastatin (LIPITOR) 20 MG tablet Take 1 tablet by mouth daily 2/24/21  Yes Dayana Antonio MD   escitalopram (LEXAPRO) 10 MG tablet Take 1 tablet by mouth daily 2/24/21  Yes Dayana Antonio MD   albuterol sulfate  (90 Base) MCG/ACT inhaler Inhale 2 puffs into the lungs every morning (before breakfast)  Patient taking differently: Inhale 2 puffs into the lungs every 4 hours as needed  1/9/21  Yes Deepti Jordan MD   OXYGEN Start oxygen therapy at 2 lit with activity and sleep , give POC for ambulation Dx COPD 1/21/20  Yes Brant Montelongo MD   coenzyme Q10 100 MG CAPS capsule Take 100 mg by mouth 2 times daily    Yes Historical Provider, MD   vitamin D (CHOLECALCIFEROL) 1000 UNIT TABS tablet Take 1,000 Int'l Units by mouth daily. Yes Historical Provider, MD       Allergies:  Patient has no known allergies. Social History:   TOBACCO:   reports that she quit smoking about 2 years ago. Her smoking use included cigarettes. She started smoking about 54 years ago. She has a 33.75 pack-year smoking history. She has never used smokeless tobacco.  ETOH:   reports no history of alcohol use. OCCUPATION: None    Family History:       Problem Relation Age of Onset    Diabetes Other         GRANDMOTHER       REVIEW OF SYSTEMS:  Ten systems reviewed and negative except for as above. Physical Exam:    Vitals: /71   Pulse 67   Temp 98.4 °F (36.9 °C) (Oral)   Resp 19   Ht 5' (1.524 m)   Wt 150 lb (68 kg)   LMP  (LMP Unknown)   SpO2 98%   BMI 29.29 kg/m²   Constitutional: alert, appears stated age and cooperative  Skin: Skin color, texture, turgor normal. No rashes or lesions  Eyes:Eye: Normal external eye, conjunctiva, TONIA. ENT: Head: Normocephalic, no lesions, without obvious abnormality. Neck: no adenopathy, no carotid bruit, no JVD, supple, symmetrical, trachea midline and thyroid not enlarged, symmetric, no tenderness/mass/nodules  Respiratory: Decreased breath sounds throughout with no wheezes rales or rhonchi  Cardiovascular: regular rate and rhythm, MICHAELA 1 out of 6 click, rub or gallop  Gastrointestinal: soft, non-tender; bowel sounds normal; no masses,  no organomegaly  Genitourinary: Deferred  Musculoskeletal:extremities normal, atraumatic, no cyanosis or edema  Neurologic: Mental status AAOx person situation and time not to place directly no facial asymmetry or droop. Normal muscle strength b/l. Psychiatric: Appropriate mood and affect.  Good insight and judgement  Hematologic: No obvious bruising or bleeding    Recent Labs     07/20/21  1245   WBC 8.6   HGB 10.3*   *     Recent Labs     07/20/21  1245      K 3.6      CO2 28   BUN 11   CREATININE 0.79   GLUCOSE 101*   AST 25   ALT 14   BILITOT 0.3   ALKPHOS 133*     Troponin T:   Recent Labs     07/20/21  1245 07/20/21  1715   TROPONINI 0.022* 0.012*     URINALYSIS:  Recent Labs     07/20/21  1245   COLORU Yellow   PHUR 6.0   CLARITYU Clear   SPECGRAV 1.013   LEUKOCYTESUR Negative   UROBILINOGEN 1.0   BILIRUBINUR Negative   BLOODU Negative   GLUCOSEU Negative     -----------------------------------------------------------------   No results found. EKG: Normal sinus rhythm    Assessment and Plan   1. Elevated troponin: Patient has no chest pain normal EKG and is no concern for an acute coronary syndrome however given persistent hypotension elevated troponin apparently there was some concern for decreased cardiac output. Consult cardiology and [facility] with [facility]. 2. Hypertension: Hold all oral hypertensive medications  3. Recent diverticulitis: Patient still has some mild persistent left lower quadrant abdominal pain despite treatment with phone round of Augmentin. Check ESR CRP and procalcitonin along with blood cultures. Repeat imaging if necessary  4. Vascular dementia: Continue antiplatelet agents and statin  5. COPD without exacerbation: Continue supplemental O2, as needed nebulized aerosols  6.  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 9) E66.9    Ataxia R27.0    Status post total hip replacement, left Z96.642    Hypotension I95.9    Anemia D64.9    Acute diverticulitis K57.92    Chest pain R07.9       Delano White DO  Admitting Hospitalist    Emergency Contact:

## 2021-07-20 NOTE — ED PROVIDER NOTES
[facility] ED  eMERGENCY dEPARTMENT eNCOUnter      Pt Name: Markus Zuluaga  MRN: [id number]  [place] 1940  Date of evaluation: 7/20/2021  Provider: David Kinney PA-C    CHIEF COMPLAINT       Chief Complaint   Patient presents with    Hypotension         HISTORY OF PRESENT ILLNESS   (Location/Symptom, Timing/Onset,Context/Setting, Quality, Duration, Modifying Factors, Severity)  Note limiting factors. Markus Zuluaga is a 80 y.o. female who presents to the emergency department with a complaint of hypotension. Patient states that she had went to see her regular family physician after discharge from the hospital this past Saturday, 7/17/2021 for hypotension. Patient states while she was in the office she complained of just generalized fatigue. When her vital signs were pain, she was hypotensive, and was sent to the emergency department for reevaluation. Patient states her most recent visit to the hospital and admission was for hypotension, secondary to decreased appetite and decreased oral fluid intake. Patient states since going home, she has been eating and drinking, but not as much as she thinks is her norm. She denies any cough, no chest pain, no nausea vomiting or dizziness. She has no diarrhea. Past medical history significant for irritable bowel disease, vascular dementia, dyspnea exertion, COPD, hypertension. Recent diagnosis of colitis, currently on Augmentin. HPI    NursingNotes were reviewed. REVIEW OF SYSTEMS    (2-9 systems for level 4, 10 or more for level 5)     Review of Systems   Constitutional: Positive for fatigue. Negative for activity change and appetite change. HENT: Negative for congestion, ear discharge, ear pain, nosebleeds, rhinorrhea and sore throat. Eyes: Negative for discharge. Respiratory: Negative for shortness of breath. Cardiovascular: Negative for chest pain, palpitations and leg swelling.         Hypotension   Gastrointestinal: Negative for abdominal distention, abdominal pain and constipation. Genitourinary: Negative for difficulty urinating and dysuria. Musculoskeletal: Negative for arthralgias. Skin: Negative for color change. Neurological: Positive for weakness. Negative for dizziness, syncope, numbness and headaches. Psychiatric/Behavioral: Negative for agitation and confusion. Except as noted above the remainder of the review of systems was reviewed and negative.        PAST MEDICAL HISTORY     Past Medical History:   Diagnosis Date    COPD (chronic obstructive pulmonary disease) ([facility] [address]     Dyspnea on exertion 7/31/2019    Hypertension     Hypotension 7/13/2021    Impaired cognition     Irritable bowel syndrome without diarrhea 12/22/2020    Mild cognitive impairment with memory loss 9/25/2020    Osteoarthritis     Tobacco abuse 5/6/2019    Tobacco use disorder 5/6/2019    Vascular dementia ([address] 6/22/2020         SURGICALHISTORY       Past Surgical History:   Procedure Laterality Date    EYE SURGERY  01/01/2010    PER PROBLEM SHEET    HYSTERECTOMY  01/01/1975    PER PROBLEM SHEET    LITHOTRIPSY  01/01/2004    PER PROBLEM SHEET    TOTAL HIP ARTHROPLASTY Right 2019    TOTAL HIP ARTHROPLASTY Left 6/25/2021    LEFT HIP DIRECT ANTERIOR HIP REPLACEMENT performed by Unruly Louie MD at [facility]  01/01/1988    PER PROBLEM SHEET         CURRENT MEDICATIONS       Previous Medications    ALBUTEROL SULFATE  (90 BASE) MCG/ACT INHALER    Inhale 2 puffs into the lungs every morning (before breakfast)    ALENDRONATE (FOSAMAX) 70 MG TABLET    Take 1 tablet by mouth every 7 days    AMOXICILLIN-CLAVULANATE (AUGMENTIN) 875-125 MG PER TABLET    Take 1 tablet by mouth 2 times daily for 8 days    ASPIRIN 81 MG EC TABLET    Take 1 tablet by mouth daily    ATORVASTATIN (LIPITOR) 20 MG TABLET    Take 1 tablet by mouth daily    COENZYME Q10 100 MG CAPS CAPSULE    Take 100 mg by mouth 2 times daily ADL Assistance: Independent    Homemaking Assistance: Independent    Ambulation Assistance: Independent(No AD)    Transfer Assistance: Independent    Active : Yes    Additional Comments: Pt. reports she has fallen at home but not recently and not regularly. Pt. having word finding difficulty, but sons are present and clarify pt's answers     Social Determinants of Health     Financial Resource Strain: Low Risk     Difficulty of Paying Living Expenses: Not hard at all   Food Insecurity: No Food Insecurity    Worried About Running Out of Food in the Last Year: Never true    [address] in the Last Year: Never true   Transportation Needs:     Lack of Transportation (Medical):  Lack of Transportation (Non-Medical):    Physical Activity:     Days of Exercise per Week:     Minutes of Exercise per Session:    Stress:     Feeling of Stress :    Social Connections:     Frequency of Communication with Friends and Family:     Frequency of Social Gatherings with Friends and Family:     Attends Restorationist Services:     Active Member of Clubs or Organizations:     Attends Club or Organization Meetings:     Marital Status:    Intimate Partner Violence:     Fear of Current or Ex-Partner:     Emotionally Abused:     Physically Abused:     Sexually Abused:        SCREENINGS      @FLOW([id number])@      PHYSICAL EXAM    (up to 7 for level 4, 8 or more for level 5)     ED Triage Vitals [07/20/21 1201]   BP Temp Temp Source Pulse Resp SpO2 Height Weight   (!) 79/57 98 °F (36.7 °C) Temporal 87 18 98 % 5' (1.524 m) 150 lb (68 kg)       Physical Exam  Vitals and nursing note reviewed. Constitutional:       General: She is not in acute distress. Appearance: She is well-developed. She is not ill-appearing, toxic-appearing or diaphoretic. HENT:      Head: Normocephalic. Right Ear: Tympanic membrane normal.      Left Ear: Tympanic membrane normal.      Nose: No congestion.       Mouth/Throat: Mouth: Mucous membranes are dry. Pharynx: No oropharyngeal exudate or posterior oropharyngeal erythema. Comments: Dry oral mucosa  Eyes:      Extraocular Movements: Extraocular movements intact. Conjunctiva/sclera: Conjunctivae normal.      Pupils: Pupils are equal, round, and reactive to light. Neck:      Vascular: No JVD. Trachea: No tracheal deviation. Cardiovascular:      Rate and Rhythm: Normal rate. Pulses: Normal pulses. Heart sounds: Normal heart sounds. No murmur heard. No friction rub. No gallop. Pulmonary:      Effort: Pulmonary effort is normal. No tachypnea, accessory muscle usage, respiratory distress or retractions. Breath sounds: No stridor. No wheezing, rhonchi or rales. Comments: Lung sounds are clear in all fields, there is no wheezes rales or rhonchi, no accessory muscle use, no retractions. Chest:      Chest wall: No tenderness. Abdominal:      General: Abdomen is flat. Bowel sounds are normal. There is no distension or abdominal bruit. Palpations: There is no shifting dullness, fluid wave, hepatomegaly, splenomegaly, mass or pulsatile mass. Tenderness: There is no abdominal tenderness. There is no right CVA tenderness, left CVA tenderness, guarding or rebound. Negative signs include Bauer's sign, Rovsing's sign and McBurney's sign. Musculoskeletal:         General: No deformity. Normal range of motion. Cervical back: Normal range of motion and neck supple. No rigidity. Skin:     General: Skin is warm and dry. Capillary Refill: Capillary refill takes less than 2 seconds. Coloration: Skin is not jaundiced. Neurological:      General: No focal deficit present. Mental Status: She is alert and oriented to person, place, and time. Mental status is at baseline. Cranial Nerves: No cranial nerve deficit. Sensory: No sensory deficit. Motor: No weakness.       Coordination: Coordination normal. to and read back by Elton Oneill, 07/20/2021 13:31, by [address] [facility] other labs were within normal range or not returned as of this dictation. EMERGENCY DEPARTMENT COURSE and DIFFERENTIAL DIAGNOSIS/MDM:   Vitals:    Vitals:    07/20/21 1415 07/20/21 1430 07/20/21 1445 07/20/21 1500   BP: (!) 108/96 139/83  (!) 150/83   Pulse: 83 81 75 78   Resp: 16 16 16 19   Temp:       TempSrc:       SpO2: 100% 100% 100% 100%   Weight:       Height:                MDM  Number of Diagnoses or Management Options  Elevated troponin  Hypotension, unspecified hypotension type  Diagnosis management comments: She had presented to the emergency department for hypotension after making a follow-up visit with her primary doctor today for recent admission to the hospital for the same. Patient denied any chest pain shortness of breath, but complained of generalized ongoing fatigue for the last couple of days. Patient states that she was discharged from the hospital on 7/17/2021, she states she has been doing well, decreased appetite at home though she denies any chest pain or shortness of breath. She is hypotensive on arrival with a blood pressure 79/57. She denies any nausea or vomiting, she has no diarrhea. No fevers. She was given IV hydration, her pressure did improve to approximately 047 systolic. Evaluation shows a mildly elevated troponin of 0.022, and according to previous documentation I do not see any positive troponins in this patient in the past.  She denies any chest pain at this time, fatigue and weakness is her only complaint. At this time patient will be admitted into the hospital for further evaluation for elevated troponin, hypotension. She states that she has been seen in the past by [facility]  [facility]. CRITICAL CARE TIME   Total Critical Care time was 0 minutes, excluding separately reportableprocedures.   There was a high probability of clinicallysignificant/life

## 2021-07-21 PROBLEM — K57.92 DIVERTICULITIS: Status: ACTIVE | Noted: 2021-01-01

## 2021-07-21 NOTE — CARE COORDINATION
Team nrsg quality rounds done and also spoke to Dr. Larissa Hastings re: pt. PT/OT ordered to eval and assess for home safety/discharge needs.

## 2021-07-21 NOTE — FLOWSHEET NOTE
Pt remained stable during the night, BP WNL, denied pain, no concerns expressed,mild dizzy while getting up, safety maintained.

## 2021-07-21 NOTE — CONSULTS
Cardiology Consult Note      Date:   7/21/2021  Patient name:  Jake Mckeon  Date of admission:  7/20/2021 12:07 PM  MRN:   [id number]  YOB: 1940  Time of Consult:  11:43 AM    Consulting Cardiologist: Mark Mills MD MD  Primary Cardiologist:  Dr. Naheed Cortez    Referring Provider: Dr. Narda Jhaveri    HPI:   Jake Mckeon is a 80 y.o.  female patient who is being at the request of Dr. Yulisa Chairez for inpatient consultation of elevated troponin. She was admitted on 7/20/2021. Previous [address] and [address] [facility] records have been reviewed in detail. She was initially seen on February 4, 2020 for preoperative cardiac assessment prior to right total hip replacement surgery. She was noted on preoperative testing to have an abnormal resting EKG. She does not have any previous history of atherosclerotic heart valvular heart disease. She has a history of essential hypertension that has been reasonably well-controlled with amlodipine. She has a history of hyperlipidemia for which she is on atorvastatin. No history of diabetes mellitus. She has underlying COPD secondary to long-standing tobacco abuse. She wears continuous home oxygen. She quit smoking one year ago. She described chronic modest exertional shortness of breath. Her functional capacity was approximately 3-4 METS. She underwent a Lexiscan myocardial perfusion study on February 10, 2020. That study was negative for ischemia or infarction. LV Ejection Fraction was 83%. She underwent successful right hip surgery and is almost fully recovered from that. She went to her primary care physician's office for a routine visit was noted to have asymptomatic hypotension. In the emergency department she had a systolic blood pressure of 79 mm Hg. She was give a liter of fluid with rapid improvement of blood pressure. Troponin was noted to be 0.022. She is completely asymptomatic. She denies any chest pain or shortness of breath.   She denies any orthopnea, PND, or peripheral edema. She denies any lightheadedness, palpitations, near syncope, or syncope. She denies any fever, chills or cough. Allergies:  No Known Allergies    Past Medical History:  Past Medical History:   Diagnosis Date    COPD (chronic obstructive pulmonary disease) (HCC)     Dyspnea on exertion 2019    Hypertension     Hypotension 2021    Impaired cognition     Irritable bowel syndrome without diarrhea 2020    Mild cognitive impairment with memory loss 2020    Osteoarthritis     Tobacco abuse 2019    Tobacco use disorder 2019    Vascular dementia ([address] 2020       Past Surgical History:  Past Surgical History:   Procedure Laterality Date    EYE SURGERY  2010    PER PROBLEM SHEET    HYSTERECTOMY  1975    PER PROBLEM SHEET    LITHOTRIPSY  2004    PER PROBLEM SHEET    TOTAL HIP ARTHROPLASTY Right 2019    TOTAL HIP ARTHROPLASTY Left 2021    LEFT HIP DIRECT ANTERIOR HIP REPLACEMENT performed by Cathy Hickman MD at [facility]  1988    PER PROBLEM SHEET       Family History:       Problem Relation Age of Onset    Diabetes Other         GRANDMOTHER       Social  History:     Social History     Tobacco Use    Smoking status: Former Smoker     Packs/day: 0.75     Years: 45.00     Pack years: 33.75     Types: Cigarettes     Start date:      Quit date: 2019     Years since quittin.4    Smokeless tobacco: Never Used   Substance Use Topics    Alcohol use: No     Comment: social       Home Medications:    Prior to Admission medications    Medication Sig Start Date End Date Taking?  Authorizing Provider   aspirin 81 MG EC tablet Take 1 tablet by mouth daily 7/15/21  Yes Prmea Payne DO   amoxicillin-clavulanate (AUGMENTIN) 875-125 MG per tablet Take 1 tablet by mouth 2 times daily for 8 days  Patient taking differently: Take 1 tablet by mouth 2 times daily Was started Friday Oral BID Jovanna People Sedar, DO   1 tablet at 07/21/21 0934    ondansetron (ZOFRAN-ODT) disintegrating tablet 4 mg  4 mg Oral Q8H PRN Jovanna People Sedar, DO        Or    [facility] injection 4 mg  4 mg Intravenous Q6H PRN Jovanna People Sedar, DO        nitroGLYCERIN (NITROSTAT) SL tablet 0.4 mg  0.4 mg Sublingual Q5 Min PRN Jovanna People Sedar, DO        enoxaparin (LOVENOX) injection 40 mg  40 mg Subcutaneous Daily Jovanna People Sedar, DO   40 mg at 07/21/21 0934    aspirin EC tablet 81 mg  81 mg Oral Daily Martín D Sedar, DO   81 mg at 07/21/21 0934       ROS:   12 point review of systems was obtained in detail and is negative other than that noted above or below. Vital Signs:  Vitals:    07/20/21 1613 07/20/21 1700 07/20/21 1921 07/21/21 0655   BP:  132/82 132/71 121/70   Pulse: 79 78 67 77   Resp: 18 20 19 18   Temp:  97.9 °F (36.6 °C) 98.4 °F (36.9 °C) 97.7 °F (36.5 °C)   TempSrc:  Oral Oral Oral   SpO2: 100% 100% 98% 100%   Weight:       Height:         No intake or output data in the 24 hours ending 07/21/21 1143    Patient Vitals for the past 96 hrs (Last 3 readings):   Weight   07/20/21 1201 150 lb (68 kg)       Physical Examination:  GENERAL APPEARANCE: Well developed, well nourished, in no acute distress. CHEST: Symmetric and non-tender. INTEGUMENT: Skin warm and dry, without gross excoriationis or lesions. HEENT: No gross abnormalities of conjunctiva, teeth, gums, oral mucosa  NECK: Supple, no JVD, no bruit. Thyroid not palpable. Carotid upstrokes normal.  NEURO/PSHCY: Alert and oriented x3; appropriate behavior and responses and responses, grossly normal cerebellar function with normal balance and coordination  LUNGS: Clear to auscultation bilaterally; normal respiratory effort. HEART: Rate and rhythm regular with no evident murmur; no gallop appreciated. There are no rubs, clicks or heaves. PMI nondisplaced. ABDOMEN: Soft, nontender, no palpable hepatosplenomegaly, no mases, no bruits.  Abdominal aorta not noted to be enlarged. MUSCULOSKELETAL: Ambulatory with normal tandem gait. EXTREMITIES: Warm with good color, no clubbing or cyanois. There is no edema noted. PERIPHERAL VASCULAR: Pulses present and equally palpable; 2+ throughout. No femoral bruits. Diagnostics:    EKG:  Normal sinus rhythm  Left axis deviation  Low voltage QRS  Nonspecific ST abnormality  Abnormal ECG  When compared with ECG of 12-NOV-2019 16:02,  No significant change was found    Telemetry: normal sinus . Echocardiogram Summary   Mild to moderate concentric left ventricular hypertrophy. Normal left ventricular size and function. Left ventricular ejection fraction is visually estimated at 65-70%. Impaired relaxation pattern of LV diastolic filling. Normal right ventricular chamber size and function. Mitral valve leaflets are mildly thickened with preserved leaflet   mobility. Trivial mitral regurgitation. Mildly thickened aortic valve leaflets with preserved leaflet mobility. Aortic valve appears to be tricuspid. Trivial tricuspid regurgitation with estimated RVSP of 16 mm Hg. Small anterior pericardial effusion. No hemodynamic evidence of cardiac tamponade.      ----------------------------------------------------------------   Electronically signed by Felix Alan MD(Interpreting   physician) on 07/21/2021 12:35 PM      Lab Data:  BMP:  Recent Labs     07/20/21 1245 07/20/21 1245 07/21/21  0504     --  145*   K 3.6  --  3.3*     --  110*   CO2 28  --  27   BUN 11  --  10   CREATININE 0.79  --  0.54   LABGLOM >60.0   < > >60.0    < > = values in this interval not displayed.      CBC:  Recent Labs     07/20/21 1245 07/21/21  0709   WBC 8.6 5.3   RBC 3.37* 2.86*   HGB 10.3* 8.8*   HCT 31.5* 26.9*   MCV 93.5 94.2   MCH 30.5 30.7   MCHC 32.6* 32.6*   RDW 16.7* 16.9*   * 412*     Cardiac Enzymes:   Recent Labs     07/20/21  1245 07/20/21  1245 07/20/21  1715 07/20/21  2347 07/21/21  0504   CKTOTAL 29  -- --   --   --    TROPONINI 0.022*   < > 0.012* <0.010 <0.010    < > = values in this interval not displayed. Hepatic Function Panel:  Recent Labs     07/20/21  1245   ALKPHOS 133*   ALT 14   AST 25   PROT 6.6   BILITOT 0.3   LABALBU 3.3*     Magnesium:  Recent Labs     07/21/21  0504   MG 2.0     Pro-BNP:  Lab Results   Component Value Date    PROBNP 342 07/20/2014     TSH:  Lab Results   Component Value Date    TSH 1.760 05/06/2019     Lipid Profile:  Lab Results   Component Value Date    TRIG 116 07/21/2021    HDL 38 07/21/2021    HDL 66 11/02/2010    LDLCALC 41 07/21/2021    LABVLDL 26 02/11/2020     HgbA1C:  Lab Results   Component Value Date    LABA1C 6.0 11/13/2019     Lactate Level:  Recent Labs     07/20/21  1245   LACTA 1.1     CMP:  Recent Labs     07/20/21  1245 07/21/21  0504    145*   K 3.6 3.3*    110*   CO2 28 27   BUN 11 10   CREATININE 0.79 0.54   GLUCOSE 101* 95   CALCIUM 9.2 8.0*   PROT 6.6  --    LABALBU 3.3*  --    BILITOT 0.3  --    ALKPHOS 133*  --    AST 25  --    ALT 14  --        Radiology:   No orders to display       Result Date: 7/13/2021  EXAMINATION:  CT ABDOMEN AND PELVIS WITH IV CONTRAST CLINICAL HISTORY: Bilateral lower quadrant tenderness with guarding. 1.  Suspected acute diverticulitis along the rectosigmoid region with adjacent soft tissue stranding. No abscess or drainable fluid collection. 2.  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 Impression:     1. Hypotension. Likely secondary to volume depletion. Her son reports that she has not been drinking or eating very well since a recent bout of diverticulitis. 2.  Elevated troponin. Likely type II MI secondary to hypotension. Doubt acute plaque rupture. No angina pectoris. No wall motion abnormalities. No acute ST changes. 3.  COPD. On continuous home O2.    4.  Vascular dementia. Recommendations:    Conservative cardiac care. Hold amlodipine.     Patient was advised to keep herself better hydrated. No additional cardiac testing currently warranted. Thank you for the opportunity to participate in the care of your patient. Do not hesitate to call if you have any questions.     Electronically signed by Mansoor Rowley MD, [facility] on 7/21/2021 at 11:43 AM

## 2021-07-22 NOTE — PROGRESS NOTES
Hospitalist Daily Progress Note  Name: Waleska Neal  Age: 80 y.o. Gender: female  CodeStatus: Full Code  Allergies: No Known Allergies    Chief Complaint:Hypotension    Primary Care Provider: Ashli Lawson MD  InpatientTreatment Team: Treatment Team: Attending Provider: Aleida Martinez DO; Utilization Reviewer: Everette Manzano, RN; Registered Nurse: Joe Sherman, RN; : VESTA Montemayor; Registered Nurse: Rosario Obrien RN  Admission Date: 7/20/2021      Subjective: Patient is seen evaluated bedside. Left lower quadrant abdominal pain is profoundly improved today following ciprofloxacin and Flagyl addition however family is quite concerned about potential side effects of ciprofloxacin is requested change to Bactrim. Patient otherwise has not had any significant large BM. Afebrile, vital stable    Physical Exam  Constitutional: alert, appears stated age and cooperative  Skin: Skin color, texture, turgor normal. No rashes or lesions  Eyes:Eye: Normal external eye, conjunctiva, TONIA. ENT: Head: Normocephalic, no lesions, without obvious abnormality. Neck: no adenopathy, no carotid bruit, no JVD, supple, symmetrical, trachea midline and thyroid not enlarged, symmetric, no tenderness/mass/nodules  Respiratory: Decreased breath sounds throughout with no wheezes rales or rhonchi  Cardiovascular: regular rate and rhythm, MICHAELA 1 out of 6 click, rub or gallop  Gastrointestinal: soft, non-tender; bowel sounds normal; no masses,  no organomegaly left lower quadrant tenderness no longer present  Genitourinary: Deferred  Musculoskeletal:extremities normal, atraumatic, no cyanosis or edema  Neurologic: Mental status AAOx person situation and time not to place directly no facial asymmetry or droop. Normal muscle strength b/l. Psychiatric: Appropriate mood and affect.  Good insight and judgement  Hematologic: No obvious bruising or bleeding  Review of Systems  14 point ROS reviewed negative except for as above  Medications:  Reviewed    Infusion Medications:   Scheduled Medications:    sulfamethoxazole-trimethoprim  1 tablet Oral 2 times per day    atorvastatin  20 mg Oral Daily    donepezil  5 mg Oral Nightly    escitalopram  10 mg Oral Daily    metroNIDAZOLE  500 mg Intravenous Q8H    docusate sodium  100 mg Oral BID    enoxaparin  40 mg Subcutaneous Daily    aspirin  81 mg Oral Daily     PRN Meds: ondansetron **OR** ondansetron, nitroGLYCERIN    Labs:   Recent Labs     07/20/21  1245 07/21/21  0709 07/22/21  0807   WBC 8.6 5.3 6.1   HGB 10.3* 8.8* 9.1*   HCT 31.5* 26.9* 27.7*   * 412* 479*     Recent Labs     07/20/21  1245 07/21/21  0504 07/22/21  0807    145* 142   K 3.6 3.3* 3.7    110* 107   CO2 28 27 26   BUN 11 10 7*   CREATININE 0.79 0.54 0.45*   CALCIUM 9.2 8.0* 8.6     Recent Labs     07/20/21  1245   AST 25   ALT 14   BILITOT 0.3   ALKPHOS 133*     No results for input(s): INR in the last 72 hours. Recent Labs     07/20/21  1245 07/20/21  1245 07/20/21  1715 07/20/21  2347 07/21/21  0504   CKTOTAL 29  --   --   --   --    TROPONINI 0.022*   < > 0.012* <0.010 <0.010    < > = values in this interval not displayed. Urinalysis:   Lab Results   Component Value Date    NITRU Negative 07/20/2021    WBCUA 3-5 07/13/2021    BACTERIA Negative 07/13/2021    RBCUA 0-2 07/13/2021    BLOODU Negative 07/20/2021    SPECGRAV 1.013 07/20/2021    GLUCOSEU Negative 07/20/2021       Radiology:   Most recent    Chest CT      WITH CONTRAST:No results found for this or any previous visit. WITHOUT CONTRAST: No results found for this or any previous visit. CXR      2-view: Results for orders placed during the hospital encounter of 06/04/21    XR CHEST (2 VW)    Narrative  EXAMINATION: XR CHEST (2 VW)    CLINICAL HISTORY: HIP REPLACEMENT. PREOP. COMPARISONS: NOVEMBER 12, 2019    FINDINGS: Osseous structures are intact. Cardiopericardial silhouette is normal. Aorta calcified. Pulmonary vasculature is normal. Lungs are clear. Impression  NO ACUTE CARDIOPULMONARY DISEASE. Portable: Results for orders placed during the hospital encounter of 11/12/19    XR CHEST PORTABLE    Narrative  XR CHEST PORTABLE    Exam Date/Time:  11/12/2019 3:30 PM  Clinical History:   CVA  Comparison:  7/20/2014    RESULT:    Lines, tubes, and devices:  None. Lungs and pleura:  No consolidation. No pleural effusion. No pneumothorax. Normal pulmonary vascular pattern. Cardiomediastinal silhouette:  Normal. Aortic atherosclerotic calcification. Other:  No acute osseous findings. Impression  No acute radiographic abnormality. Echo No results found for this or any previous visit. Assessment/Plan:    Active Hospital Problems    Diagnosis Date Noted    Diverticulitis [K57.92] 07/21/2021    Chest pain [R07.9] 07/20/2021    Hypotension [I95.9] 07/13/2021     Diverticulitis failed outpatient treat with oral antibiotics: Change Cipro and Flagyl to Bactrim and Flagyl per family request.  They did verbalize her understanding that this is not the optimal regimen. Repeat persistent inflammation noted on CT. Constipation: Lactulose x1 dose. Elevated troponin: Low concern for acute plaque rupture cardiology following  Hypotension: Hold hypertensive medications continue fluid resuscitation  Vascular dementia: Supportive care continue antiplatelet statin  COPD without exacerbation submental O2 as needed nebulized DuoNeb's  DVT prophylaxis Lovenox      Additional work up or/and treatment plan may be added today or then after based on clinical progression. I am managing a portion of pt care. Some medical issues are handled byother specialists. Additional work up and treatment should be done in out pt setting by pt PCP and other out pt providers. In addition to examining and evaluating pt, I spent additional time explaining care, normaland abnormal findings, and treatment plan.  All of pt questions were answered. Counseling, diet and education were provided. Case will be discussed with nursing staff when appropriate. Family will be updated if and whenappropriate.       Electronically signed by Layo Chan DO on 7/22/2021 at 7:12 PM

## 2021-07-22 NOTE — PROGRESS NOTES
[facility] OCCUPATIONAL THERAPY EVALUATION - ACUTE     NAME: Baldo Donohue  : 1940 (80 y.o.)  MRN: [id number]  CODE STATUS: Full Code  Room: 17/X987-20    Date of Service: 2021    Patient Diagnosis(es): Chest pain [R07.9]  Hypotension [I95.9]  Diverticulitis [K57.92]   Chief Complaint   Patient presents with    Hypotension     Patient Active Problem List    Diagnosis Date Noted    Diverticulitis 2021    Chest pain 2021    Hypotension 2021    Anemia 2021    Acute diverticulitis 2021    Status post total hip replacement, left 2021    Ataxia 2021    Obesity (BMI 30-39.9) 2021    Irritable bowel syndrome without diarrhea 2020    Vascular dementia ([address] 2020    Anxiety 2020    Hypoxia 2020    Arthropathy of both hips 2019    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    Cerebrovascular accident (CVA) due to stenosis of left anterior cerebral artery ([address] 2019    Posterior rhinorrhea 2019    Dyspnea on exertion 2019    Essential (primary) hypertension 10/07/2013    Chronic obstructive pulmonary disease ([address]     Osteoarthritis     Age-related cataract 2011    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    Other intraretinal microvascular abnormalities 10/12/2010        Past Medical History:   Diagnosis Date    COPD (chronic obstructive pulmonary disease) ([address]     Dyspnea on exertion 2019    Hypertension     Hypotension 2021    Impaired cognition     Irritable bowel syndrome without diarrhea 2020    Mild cognitive impairment with memory loss 2020    Osteoarthritis     Tobacco abuse 2019    Tobacco use disorder 2019    Vascular dementia ([address] 2020     Past Surgical History:   Procedure Laterality Date    EYE SURGERY  2010    PER PROBLEM SHEET    HYSTERECTOMY  1975    PER PROBLEM SHEET    LITHOTRIPSY  2004    PER PROBLEM SHEET    TOTAL HIP ARTHROPLASTY Right 2019    TOTAL HIP ARTHROPLASTY Left 6/25/2021    LEFT HIP DIRECT ANTERIOR HIP REPLACEMENT performed by Brittanie Corrales MD at [facility]  01/01/1988    PER PROBLEM SHEET        Restrictions:O2. Anterior hip approach no hip extension on right. Safety Devices: Safety Devices  Safety Devices in place: Yes  Type of devices: All fall risk precautions in place, Call light within reach   Initially in place: No    Subjective:\"I don't like this thing(Walker). \"  Pt pleasant and cooperative. Pt's sons present for evaluation.        Pain Reassessment: 0/10 pain reported          Prior Level of Function:  Social/Functional History  Lives With: Son (daughter in law)  Type of Home: House  Home Layout: One level  Home Access: Stairs to enter with rails  Entrance Stairs - Number of Steps: 2  Bathroom Shower/Tub: Walk-in shower  Bathroom Equipment: Grab bars in shower, Built-in shower seat, Hand-held shower  Home Equipment: Rolling walker, Oxygen (Pt does have and adjustable bed)  ADL Assistance: Independent  Homemaking Assistance: Independent  Homemaking Responsibilities: Yes  Ambulation Assistance: Independent  Transfer Assistance: Independent  Active : No  Additional Comments: Prior to 1 month ago, Pt had a hip replacement, patient was completing homemaking    OBJECTIVE:     Orientation Status:  Orientation  Overall Orientation Status: Impaired  Orientation Level: Oriented to person, Oriented to place (Verbal cues for month but day and year correct)    Observation:  Observation/Palpation  Posture: Good  Observation: O2 in place  Edema: mild bilateral LE    Cognition Status:  Cognition  Cognition Comment: follows one step commands consistently    Perception Status:  Perception  Overall Perceptual Status: WFL    Sensation Status:  Sensation  Overall Sensation Status: WFL    Vision and Hearing Status:  Vision  Vision: Impaired  Vision Exceptions: Wears glasses for reading (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)     ROM:   LUE AROM (degrees)  LUE AROM : WFL  Left Hand AROM (degrees)  Left Hand AROM: WFL  RUE AROM (degrees)  RUE AROM : WFL  Right Hand AROM (degrees)  Right Hand AROM: WFL    Strength:  LUE Strength  Gross LUE Strength: WFL  L Hand General: 4/5  RUE Strength  Gross RUE Strength: WFL  R Hand General: 4/5    Coordination, Tone, Quality of Movement:    Tone RUE  RUE Tone: Normotonic  Tone LUE  LUE Tone: Normotonic  Coordination  Movements Are Fluid And Coordinated: Yes    Hand Dominance:  Hand Dominance  Hand Dominance: Right    ADL Status:  ADL  Feeding: Unable to assess(comment)  Grooming: Setup  UE Bathing: Setup  LE Bathing: Setup  UE Dressing: Setup  LE Dressing: Setup  Toileting: Unable to assess(comment)          Therapy key for assistance levels -   Independent = Pt. is able to perform task with no assistance but may require a device   Stand by assistance = Pt. does not perform task at an independent level but does not need physical assistance, requires verbal cues  Minimal, Moderate, Maximal Assistance = Pt. requires physical assistance (25%, 50%, 75% assist from helper) for task but is able to actively participate in task   Dependent = Pt. requires total assistance with task and is not able to actively participate with task completion     Functional Mobility:     Transfers  Sit to stand: Supervision  Stand to sit: Supervision    Bed Mobility  Bed mobility  Supine to Sit: Supervision  Sit to Supine: Supervision  Scooting: Supervision    Seated and Standing Balance:  Balance  Sitting Balance: Independent  Standing Balance: Supervision    Functional Endurance:  Activity Tolerance  Activity Tolerance: Patient Tolerated treatment well    D/C Recommendations:  OT D/C RECOMMENDATIONS  REQUIRES OT FOLLOW UP: No    Equipment Recommendations:       OT Education:        OT Follow Up:  OT D/C RECOMMENDATIONS  REQUIRES OT FOLLOW UP: No

## 2021-07-22 NOTE — PROGRESS NOTES
Hospitalist Daily Progress Note  Name: Mariola Ray  Age: 80 y.o. Gender: female  CodeStatus: Full Code  Allergies: No Known Allergies    Chief Complaint:Hypotension    Primary Care Provider: Dg Tinajero MD  InpatientTreatment Team: Treatment Team: Attending Provider: Ana Ross DO; Consulting Physician: Dyana Jett MD; Patient Care Tech: Renee Nguyen; Registered Nurse: Lissa Wilson RN  Admission Date: 7/20/2021      Subjective: Patient is seen evaluated bedside. Patient is persistent intractable abdominal pain worse in the left lower quadrant. Case discussed with the patient's PCP as well. CT abdomen does show persistent stranding and inflammation around the sigmoid colon with a large amount of retained stool. Otherwise no new complaints vital stable patient afebrile    Physical Exam  Constitutional: alert, appears stated age and cooperative  Skin: Skin color, texture, turgor normal. No rashes or lesions  Eyes:Eye: Normal external eye, conjunctiva, TONIA. ENT: Head: Normocephalic, no lesions, without obvious abnormality. Neck: no adenopathy, no carotid bruit, no JVD, supple, symmetrical, trachea midline and thyroid not enlarged, symmetric, no tenderness/mass/nodules  Respiratory: Decreased breath sounds throughout with no wheezes rales or rhonchi  Cardiovascular: regular rate and rhythm, MICHAELA 1 out of 6 click, rub or gallop  Gastrointestinal: soft, non-tender; bowel sounds normal; no masses,  no organomegaly left lower quadrant tenderness to palpation  Genitourinary: Deferred  Musculoskeletal:extremities normal, atraumatic, no cyanosis or edema  Neurologic: Mental status AAOx person situation and time not to place directly no facial asymmetry or droop. Normal muscle strength b/l. Psychiatric: Appropriate mood and affect.  Good insight and judgement  Hematologic: No obvious bruising or bleeding  Review of Systems  14 point ROS reviewed negative except for as Results for orders placed during the hospital encounter of 11/12/19    XR CHEST PORTABLE    Narrative  XR CHEST PORTABLE    Exam Date/Time:  11/12/2019 3:30 PM  Clinical History:   CVA  Comparison:  7/20/2014    RESULT:    Lines, tubes, and devices:  None. Lungs and pleura:  No consolidation. No pleural effusion. No pneumothorax. Normal pulmonary vascular pattern. Cardiomediastinal silhouette:  Normal. Aortic atherosclerotic calcification. Other:  No acute osseous findings. Impression  No acute radiographic abnormality. Echo No results found for this or any previous visit. Assessment/Plan:    Active Hospital Problems    Diagnosis Date Noted    Chest pain [R07.9] 07/20/2021    Hypotension [I95.9] 07/13/2021     Diverticulitis failed outpatient treat with oral antibiotics: Start IV Cipro and Flagyl. Repeat persistent inflammation noted on CT. Constipation: Lactulose x1 dose. Elevated troponin: Low concern for acute plaque rupture cardiology following  Hypotension: Hold hypertensive medications continue fluid resuscitation  Vascular dementia: Supportive care continue antiplatelet statin  COPD without exacerbation submental O2 as needed nebulized DuoNeb's  DVT prophylaxis Lovenox      Additional work up or/and treatment plan may be added today or then after based on clinical progression. I am managing a portion of pt care. Some medical issues are handled byother specialists. Additional work up and treatment should be done in out pt setting by pt PCP and other out pt providers. In addition to examining and evaluating pt, I spent additional time explaining care, normaland abnormal findings, and treatment plan. All of pt questions were answered. Counseling, diet and education were provided. Case will be discussed with nursing staff when appropriate. Family will be updated if and whenappropriate.       Electronically signed by Marisol Srinivasan DO on 7/21/2021 at 9:55 PM

## 2021-07-22 NOTE — PROGRESS NOTES
 LITHOTRIPSY  01/01/2004    PER PROBLEM SHEET    TOTAL HIP ARTHROPLASTY Right 2019    TOTAL HIP ARTHROPLASTY Left 6/25/2021    LEFT HIP DIRECT ANTERIOR HIP REPLACEMENT performed by Casey Ibarra MD at [facility]  01/01/1988    PER PROBLEM SHEET       Chart Reviewed: Yes  Family / Caregiver Present: Yes (son)  General Comment  Comments: Pt up to restroom - agreeable to PT evaluation    Restrictions:  Restrictions/Precautions:  (Med hurley score)  Position Activity Restriction  Other position/activity restrictions: 1month post L JAME (anterior hip)     SUBJECTIVE: Subjective: \"I can't wait to get rid of this walker. \"    Pain  Pre Treatment Pain Screening  Comments / Details: denies    Post Treatment Pain Screening:   Pain Assessment  Pain Assessment:  (denies)    Prior Level of Function:  Social/Functional History  Lives With: Son (daughter in law)  Type of Home: House  Home Layout: One level  Home Access: Stairs to enter with rails  Entrance Stairs - Number of Steps: 2  Bathroom Shower/Tub: Walk-in shower  Bathroom Equipment: Grab bars in shower, Built-in shower seat, Hand-held shower  Home Equipment: Rolling walker, Oxygen (Pt does have and adjustable bed)  ADL Assistance: Independent  Homemaking Assistance: Independent  Homemaking Responsibilities: Yes  Ambulation Assistance: Independent  Transfer Assistance: Independent  Active : No  Additional Comments: Prior to 1 month ago, Pt had a hip replacement, patient was completing homemaking    OBJECTIVE:   Vision Exceptions: Wears glasses for reading (blind R eye)  Hearing: Within functional limits    Cognition:  Overall Orientation Status: Within Functional Limits  Follows Commands: Within Functional Limits    Observation/Palpation  Observation: No acute distress noted. Pt pleasant and motivated.     ROM:  RLE PROM: WFL  LLE PROM: WFL    Strength:  Strength RLE  Strength RLE: WFL  Strength LLE  Strength LLE: WFL  Comment: grossly 3-/5    Neuro:  Balance  Sitting - Static: Good  Sitting - Dynamic: Good  Standing - Static: Good  Standing - Dynamic: Fair     Motor Control  Gross Motor?: WFL  Sensation  Overall Sensation Status: WFL    Bed mobility  Sit to Supine: Modified independent  Scooting: Modified independent    Transfers  Sit to Stand: Modified independent  Stand to sit: Modified independent  Bed to Chair: Modified independent    Ambulation  Ambulation?: Yes  Ambulation 1  Surface: level tile  Device: Rolling Walker; No Device  Assistance: Supervision;Contact guard assistance  Quality of Gait: Mildly inconsistent L LE placement and decreased weight acceptance to L LE in stance. No AD = CGA; [facility] = Supervised. Pt states that she was recently upgraded to [facility], however has been in/out of hosp for the past few weeks and has not been able to resume her therapy. Distance: 10ft X 3    Stairs/Curb  Stairs?: No         Activity Tolerance  Activity Tolerance: Patient Tolerated treatment well          PT Education  PT Education: Goals;PT Role;Plan of Care    ASSESSMENT:   Body structures, Functions, Activity limitations: Decreased functional mobility ; Decreased endurance;Decreased strength;Decreased ADL status; Decreased balance  Decision Making: Medium Complexity  History: Med  Exam: Med  Clinical Presentation: Med    Prognosis: Good  Barriers to Learning: none    DISCHARGE RECOMMENDATIONS:  Discharge Recommendations: Continue to assess pending progress, Patient would benefit from continued therapy after discharge    Assessment: Continued PT indicated to resume and progress mobility and strength training program from previous JAME.  Rec f/u PT upon DC.  REQUIRES PT FOLLOW UP: Yes      PLAN OF CARE:  Plan  Times per week: 2-3 visits/week  Current Treatment Recommendations: Strengthening, Balance Training, Functional Mobility Training, Transfer Training, Endurance Training, Gait Training, Stair training, Neuromuscular Re-education, Home Exercise Program, Safety Education & Training, Patient/Caregiver Education & Training, Equipment Evaluation, Education, & procurement  Safety Devices  Type of devices: All fall risk precautions in place    Goals:  Long term goals  Long term goal 1: Pt to complete HEP with indep  Long term goal 2: Pt to ambualte 150-300ft with SPC and indep  Long term goal 3: Pt to complete 4 steps with L LE leading indep    AMPAC (6 CLICK) BASIC MOBILITY  AM-PAC Inpatient Mobility Raw Score : 22     Therapy Time:   Individual   Time In 1455   Time Out 1512   Minutes [address], [address] [facility], 07/22/21 at 3:27 PM         Definitions for assistance levels  Independent = pt does not require any physical supervision or assistance from another person for activity completion. Device may be needed.   Stand by assistance = pt requires verbal cues or instructions from another person, close to but not touching, to perform the activity  Minimal assistance= pt performs 75% or more of the activity; assistance is required to complete the activity  Moderate assistance= pt performs 50% of the activity; assistance is required to complete the activity  Maximal assistance = pt performs 25% of the activity; assistance is required to complete the activity  Dependent = pt requires total physical assistance to accomplish the task

## 2021-07-22 NOTE — PROGRESS NOTES
Cardiology Progress Note      Date:   7/22/2021     Patient name:  Guillermo Mendez  MRN:   [id number]  YOB: 1940    Rounding Cardiologist: Hannah Wilson MD MD  Primary Cardiologist:  Dr. Jad Ernst    Referring Provider: Dr. Mirella Major    Subjective:    Patient is feeling better today. Has diverticulitis and is on antibiotics and hydration. Denies any chest pain. Son was in the room and discussed treatment plans with him. 14 system General and Cardiac ROS otherwise negative or unchanged. Impression:     1. Hypotension. Likely secondary to volume depletion. Her son reports that she has not been drinking or eating very well since a recent bout of diverticulitis. 2.  Elevated troponin. Likely type II MI secondary to hypotension. Doubt acute plaque rupture. No angina pectoris. No wall motion abnormalities. No acute ST changes. 3.  COPD. On continuous home O2.    4.  Vascular dementia. 5.  As per below and Prior otherwise. Recommendations:    Conservative cardiac care. Hold amlodipine, resume if needed for BP support after rehydrated. Patient was advised to keep herself better hydrated. No additional cardiac testing currently warranted. Cardiology Sign off. Follow up with Dr Shanice Correia as noted. See Orders.    =============================================    HPI:   Guillermo Mendez is a 80 y.o.  female patient who is being at the request of Dr. Viviana Murray for inpatient consultation of elevated troponin. She was admitted on 7/20/2021. Previous [address] [place] [address] and [facility] records have been reviewed in detail. She was initially seen on February 4, 2020 for preoperative cardiac assessment prior to right total hip replacement surgery. She was noted on preoperative testing to have an abnormal resting EKG. She does not have any previous history of atherosclerotic heart valvular heart disease.  She has a history of essential hypertension that has been reasonably well-controlled with amlodipine. She has a history of hyperlipidemia for which she is on atorvastatin. No history of diabetes mellitus. She has underlying COPD secondary to long-standing tobacco abuse. She wears continuous home oxygen. She quit smoking one year ago. She described chronic modest exertional shortness of breath. Her functional capacity was approximately 3-4 METS. She underwent a Lexiscan myocardial perfusion study on February 10, 2020. That study was negative for ischemia or infarction. LV Ejection Fraction was 83%. She underwent successful right hip surgery and is almost fully recovered from that. She went to her primary care physician's office for a routine visit was noted to have asymptomatic hypotension. In the emergency department she had a systolic blood pressure of 79 mm Hg. She was give a liter of fluid with rapid improvement of blood pressure. Troponin was noted to be 0.022. She is completely asymptomatic. She denies any chest pain or shortness of breath. She denies any orthopnea, PND, or peripheral edema. She denies any lightheadedness, palpitations, near syncope, or syncope. She denies any fever, chills or cough.         Allergies:  No Known Allergies    Past Medical History:  Past Medical History:   Diagnosis Date    COPD (chronic obstructive pulmonary disease) (HCC)     Dyspnea on exertion 7/31/2019    Hypertension     Hypotension 7/13/2021    Impaired cognition     Irritable bowel syndrome without diarrhea 12/22/2020    Mild cognitive impairment with memory loss 9/25/2020    Osteoarthritis     Tobacco abuse 5/6/2019    Tobacco use disorder 5/6/2019    Vascular dementia ([facility] [place] [address] 6/22/2020       Past Surgical History:  Past Surgical History:   Procedure Laterality Date    EYE SURGERY  01/01/2010    PER PROBLEM SHEET    HYSTERECTOMY  01/01/1975    PER PROBLEM SHEET    LITHOTRIPSY  01/01/2004    PER PROBLEM SHEET    TOTAL HIP ARTHROPLASTY Right 2019    TOTAL HIP ARTHROPLASTY Left 2021    LEFT HIP DIRECT ANTERIOR HIP REPLACEMENT performed by Sade Bee MD at [facility]  1988    PER PROBLEM SHEET       Family History:       Problem Relation Age of Onset    Diabetes Other         GRANDMOTHER       Social  History:     Social History     Tobacco Use    Smoking status: Former Smoker     Packs/day: 0.75     Years: 45.00     Pack years: 33.75     Types: Cigarettes     Start date:      Quit date: 2019     Years since quittin.4    Smokeless tobacco: Never Used   Substance Use Topics    Alcohol use: No     Comment: social       Home Medications:    Prior to Admission medications    Medication Sig Start Date End Date Taking?  Authorizing Provider   aspirin 81 MG EC tablet Take 1 tablet by mouth daily 7/15/21  Yes Hakeem Wisdom DO   amoxicillin-clavulanate (AUGMENTIN) 875-125 MG per tablet Take 1 tablet by mouth 2 times daily for 8 days  Patient taking differently: Take 1 tablet by mouth 2 times daily Was started Friday night at 6pm 7/15/21 7/23/21 Yes Hakeem Wisdom DO   vitamin B-12 (CYANOCOBALAMIN) 500 MCG tablet Take 500 mcg by mouth daily   Yes Historical Provider, MD   fluticasone (FLONASE) 50 MCG/ACT nasal spray USE 2 SPRAYS NASALLY DAILY 21  Yes Amina Farah MD   donepezil (ARICEPT) 5 MG tablet Take 1 tablet by mouth nightly 21 Yes Aguila Calvert MD   umeclidinium-vilanterol (ANORO ELLIPTA) 62.5-25 MCG/INH AEPB inhaler Inhale 1 puff into the lungs daily 21  Yes Amina Farah MD   alendronate (FOSAMAX) 70 MG tablet Take 1 tablet by mouth every 7 days 21  Yes Mateo Granados MD   atorvastatin (LIPITOR) 20 MG tablet Take 1 tablet by mouth daily 21  Yes Mateo Granados MD   escitalopram (LEXAPRO) 10 MG tablet Take 1 tablet by mouth daily 21  Yes Mateo Granados MD   albuterol sulfate  (90 Base) MCG/ACT inhaler Inhale 2 puffs into the lungs every morning (before breakfast)  Patient taking differently: Inhale 2 puffs into the lungs every 4 hours as needed  1/9/21  Yes Olivia Mc MD   OXYGEN Start oxygen therapy at 2 lit with activity and sleep , give POC for ambulation     Dx COPD 1/21/20  Yes Olivia Mc MD   coenzyme Q10 100 MG CAPS capsule Take 100 mg by mouth 2 times daily    Yes Historical Provider, MD   vitamin D (CHOLECALCIFEROL) 1000 UNIT TABS tablet Take 1,000 Int'l Units by mouth daily. Yes Historical Provider, MD       Current Medications:      IV Medications:  Current Facility-Administered Medications   Medication Dose Route Frequency Provider Last Rate Last Admin    atorvastatin (LIPITOR) tablet 20 mg  20 mg Oral Daily Martín PICHARDO Sedar, DO   20 mg at 07/21/21 0934    donepezil (ARICEPT) tablet 5 mg  5 mg Oral Nightly Ellen Clubs Sedar, DO   5 mg at 07/21/21 2225    escitalopram (LEXAPRO) tablet 10 mg  10 mg Oral Daily Martín PICHARDO Sedar, DO   10 mg at 07/21/21 0934    ciprofloxacin (CIPRO) IVPB 400 mg  400 mg Intravenous Q12H Martín PICHARDO Sedar, DO   Stopped at 07/22/21 1132    metronidazole (FLAGYL) 500 mg in NaCl 100 mL IVPB premix  500 mg Intravenous Q8H Martín PICHARDO Sedar,  mL/hr at 07/22/21 1013 500 mg at 07/22/21 1013    docusate sodium (COLACE) capsule 100 mg  100 mg Oral BID Ellen Clubs Sedar, DO   100 mg at 07/22/21 0650    ondansetron (ZOFRAN-ODT) disintegrating tablet 4 mg  4 mg Oral Q8H PRN Ellen Clubs Sedar, DO        Or    ondansetron (ZOFRAN) injection 4 mg  4 mg Intravenous Q6H PRN Ellen Clubs Sedar, DO        nitroGLYCERIN (NITROSTAT) SL tablet 0.4 mg  0.4 mg Sublingual Q5 Min PRN Ellen Clubs Sedar, DO        enoxaparin (LOVENOX) injection 40 mg  40 mg Subcutaneous Daily Martín PICHARDO Sedar, DO   40 mg at 07/22/21 0951    aspirin EC tablet 81 mg  81 mg Oral Daily Martín PICHARDO Sedar, DO   81 mg at 07/22/21 0950       ROS:   12 point review of systems was obtained in detail and is negative other than that noted above or below.        Vital Signs:  Vitals:    07/21/21 1440 07/21/21 1946 07/22/21 0731 07/22/21 0745   BP: 137/71 136/66  130/72   Pulse: 96 77  70   Resp:  18  18   Temp: 97.9 °F (36.6 °C) 98.8 °F (37.1 °C)  97.9 °F (36.6 °C)   TempSrc:  Oral  Oral   SpO2: 93% 97%  100%   Weight:   154 lb 1.6 oz (69.9 kg)    Height:         No intake or output data in the 24 hours ending 07/22/21 1142    Patient Vitals for the past 96 hrs (Last 3 readings):   Weight   07/22/21 0731 154 lb 1.6 oz (69.9 kg)   07/20/21 1201 150 lb (68 kg)       Physical Examination:  GENERAL APPEARANCE: Well developed, well nourished, in no acute distress. CHEST: Symmetric and non-tender. INTEGUMENT: Skin warm and dry, without gross excoriationis or lesions. HEENT: No gross abnormalities of conjunctiva, teeth, gums, oral mucosa  NECK: Supple, no JVD, no bruit. Thyroid not palpable. Carotid upstrokes normal.  NEURO/PSHCY: Alert and oriented x3; appropriate behavior and responses and responses, grossly normal cerebellar function with normal balance and coordination  LUNGS: Clear to auscultation bilaterally; normal respiratory effort. HEART: Rate and rhythm regular with no evident murmur; no gallop appreciated. There are no rubs, clicks or heaves. PMI nondisplaced. ABDOMEN: Soft, nontender, no palpable hepatosplenomegaly, no mases, no bruits. Abdominal aorta not noted to be enlarged. MUSCULOSKELETAL: Ambulatory with normal tandem gait. EXTREMITIES: Warm with good color, no clubbing or cyanois. There is no edema noted. PERIPHERAL VASCULAR: Pulses present and equally palpable; 2+ throughout. No femoral bruits. Diagnostics:    EKG:  Normal sinus rhythm  Left axis deviation  Low voltage QRS  Nonspecific ST abnormality  Abnormal ECG  When compared with ECG of 12-NOV-2019 16:02,  No significant change was found    Telemetry: normal sinus . Echocardiogram Summary   Mild to moderate concentric left ventricular hypertrophy. Normal left ventricular size and function. Left ventricular ejection fraction is visually estimated at 65-70%. Impaired relaxation pattern of LV diastolic filling. Normal right ventricular chamber size and function. Mitral valve leaflets are mildly thickened with preserved leaflet   mobility. Trivial mitral regurgitation. Mildly thickened aortic valve leaflets with preserved leaflet mobility. Aortic valve appears to be tricuspid. Trivial tricuspid regurgitation with estimated RVSP of 16 mm Hg. Small anterior pericardial effusion. No hemodynamic evidence of cardiac tamponade.      ----------------------------------------------------------------   Electronically signed by Felix Zamora MD(Interpreting   physician) on 07/21/2021 12:35 PM      Lab Data:  BMP:  Recent Labs     07/20/21  1245 07/20/21  1245 07/21/21  0504 07/21/21  0504 07/22/21  0807     --  145*  --  142   K 3.6  --  3.3*  --  3.7     --  110*  --  107   CO2 28  --  27  --  26   BUN 11  --  10  --  7*   CREATININE 0.79  --  0.54  --  0.45*   LABGLOM >60.0   < > >60.0   < > >60.0    < > = values in this interval not displayed. CBC:  Recent Labs     07/20/21  1245 07/21/21  0709 07/22/21  0807   WBC 8.6 5.3 6.1   RBC 3.37* 2.86* 2.96*   HGB 10.3* 8.8* 9.1*   HCT 31.5* 26.9* 27.7*   MCV 93.5 94.2 93.3   MCH 30.5 30.7 30.8   MCHC 32.6* 32.6* 33.0   RDW 16.7* 16.9* 17.0*   * 412* 479*     Cardiac Enzymes:   Recent Labs     07/20/21  1245 07/20/21  1245 07/20/21  1715 07/20/21  2347 07/21/21  0504   CKTOTAL 29  --   --   --   --    TROPONINI 0.022*   < > 0.012* <0.010 <0.010    < > = values in this interval not displayed.      Hepatic Function Panel:  Recent Labs     07/20/21  1245   ALKPHOS 133*   ALT 14   AST 25   PROT 6.6   BILITOT 0.3   LABALBU 3.3*     Magnesium:  Recent Labs     07/21/21  0504   MG 2.0     Pro-BNP:  Lab Results   Component Value Date    PROBNP 342 07/20/2014     TSH:  Lab Results   Component Value Date    TSH 1.760 05/06/2019     Lipid Profile:  Lab Results   Component Value Date    TRIG 116 07/21/2021    HDL 38 07/21/2021    HDL 66 11/02/2010    LDLCALC 41 07/21/2021    LABVLDL 26 02/11/2020     HgbA1C:  Lab Results   Component Value Date    LABA1C 6.0 11/13/2019     Lactate Level:  Recent Labs     07/20/21  1245   LACTA 1.1     CMP:  Recent Labs     07/20/21  1245 07/21/21  0504 07/22/21  0807    145* 142   K 3.6 3.3* 3.7    110* 107   CO2 28 27 26   BUN 11 10 7*   CREATININE 0.79 0.54 0.45*   GLUCOSE 101* 95 113*   CALCIUM 9.2 8.0* 8.6   PROT 6.6  --   --    LABALBU 3.3*  --   --    BILITOT 0.3  --   --    ALKPHOS 133*  --   --    AST 25  --   --    ALT 14  --   --        Radiology:   CT ABDOMEN PELVIS W IV CONTRAST Additional Contrast? None   Final Result   AGAIN NOTE IS MADE OF MURAL THICKENING OF THE RECTOSIGMOID PORTION OF THE COLON. THERE IS SOME MILD SURROUNDING PERICOLONIC STRANDING. NO EXTRALUMINAL AIR. NO FOCAL FLUID COLLECTION. UNCHANGED. DIFFERENTIAL CONSIDERATIONS MAY BE THAT OF A    REGIONAL ENTERITIS, COLITIS, DIFFUSELY INFILTRATIVE PROCESS IS NOT EXCLUDED. WILL NEED FOLLOW-UP AND FURTHER EVALUATION. AGAIN NOTE IS MADE OF A LARGE AMOUNT STOOL SCATTERED THROUGHOUT THE LARGE BOWEL. All CT scans at this facility use dose modulation, iterative reconstruction, and/or weight based dosing when appropriate to reduce radiation dose to as low as reasonably achievable. Result Date: 7/13/2021  EXAMINATION:  CT ABDOMEN AND PELVIS WITH IV CONTRAST CLINICAL HISTORY: Bilateral lower quadrant tenderness with guarding. 1.  Suspected acute diverticulitis along the rectosigmoid region with adjacent soft tissue stranding. No abscess or drainable fluid collection. 2.  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 Thank you for the opportunity to participate in the care of your patient. Do not hesitate to call if you have any questions.     Electronically signed by Paige Skelton MD, [facility] on 7/22/2021 at 11:42 AM

## 2021-07-23 NOTE — PROGRESS NOTES
Hospitalist Daily Progress Note  Name: Heather Betancourt  Age: 80 y.o. Gender: female  CodeStatus: Full Code  Allergies: No Known Allergies    Chief Complaint:Hypotension    Primary Care Provider: Marina Green MD  InpatientTreatment Team: Treatment Team: Attending Provider: Gabriel Gonzalez DO; Utilization Reviewer: Sherryle Canterbury, RN; : VESTA Infante; Registered Nurse: Ivelisse Parish RN; Consulting Physician: Brian Kaur MD; Patient Care Tech: Marbin Ramon; Patient Care Tech: J Carlos Bailon; Registered Nurse: Sirisha DosS antos RN  Admission Date: 7/20/2021      Subjective: Patient is seen evaluated bedside. Patient developed A. fib with RVR overnight terminated with Cardizem IV push x2. Of note patient has not had a history of atrial fibrillation in the past.  Cardiology was consulted and case was discussed. Patient was started on flecainide. Left lower quadrant pain totally resolved patient jennifer afebrile vitals have been stable throughout the day shift no new acute concerns     Physical Exam  Constitutional: alert, appears stated age and cooperative  Skin: Skin color, texture, turgor normal. No rashes or lesions  Eyes:Eye: Normal external eye, conjunctiva, TONIA. ENT: Head: Normocephalic, no lesions, without obvious abnormality. Neck: no adenopathy, no carotid bruit, no JVD, supple, symmetrical, trachea midline and thyroid not enlarged, symmetric, no tenderness/mass/nodules  Respiratory: Decreased breath sounds throughout with no wheezes rales or rhonchi  Cardiovascular: regular rate and rhythm, MICHAELA 1 out of 6 click, rub or gallop  Gastrointestinal: soft, non-tender; bowel sounds normal; no masses,  no organomegaly left lower quadrant tenderness no longer present  Genitourinary: Deferred  Musculoskeletal:extremities normal, atraumatic, no cyanosis or edema  Neurologic: Mental status AAOx person situation and time not to place directly no facial asymmetry or droop.  Normal muscle strength b/l. Psychiatric: Appropriate mood and affect. Good insight and judgement  Hematologic: No obvious bruising or bleeding  Review of Systems  14 point ROS reviewed negative except for as above  Medications:  Reviewed    Infusion Medications:   Scheduled Medications:    apixaban  5 mg Oral BID    flecainide  50 mg Oral 2 times per day    metoprolol succinate  12.5 mg Oral Daily    sulfamethoxazole-trimethoprim  1 tablet Oral 2 times per day    atorvastatin  20 mg Oral Daily    donepezil  5 mg Oral Nightly    escitalopram  10 mg Oral Daily    metroNIDAZOLE  500 mg Intravenous Q8H    docusate sodium  100 mg Oral BID     PRN Meds: ondansetron **OR** ondansetron, nitroGLYCERIN    Labs:   Recent Labs     07/21/21  0709 07/22/21  0807 07/23/21  0630   WBC 5.3 6.1 6.4   HGB 8.8* 9.1* 8.9*   HCT 26.9* 27.7* 28.1*   * 479* 424*     Recent Labs     07/21/21  0504 07/22/21  0807 07/23/21  0630   * 142 143   K 3.3* 3.7 3.2*   * 107 108*   CO2 27 26 26   BUN 10 7* 6*   CREATININE 0.54 0.45* 0.50   CALCIUM 8.0* 8.6 8.0*     No results for input(s): AST, ALT, BILIDIR, BILITOT, ALKPHOS in the last 72 hours. No results for input(s): INR in the last 72 hours. Recent Labs     07/20/21  2347 07/21/21  0504   TROPONINI <0.010 <0.010       Urinalysis:   Lab Results   Component Value Date    NITRU Negative 07/20/2021    WBCUA 3-5 07/13/2021    BACTERIA Negative 07/13/2021    RBCUA 0-2 07/13/2021    BLOODU Negative 07/20/2021    SPECGRAV 1.013 07/20/2021    GLUCOSEU Negative 07/20/2021       Radiology:   Most recent    Chest CT      WITH CONTRAST:No results found for this or any previous visit. WITHOUT CONTRAST: No results found for this or any previous visit. CXR      2-view: Results for orders placed during the hospital encounter of 06/04/21    XR CHEST (2 VW)    Narrative  EXAMINATION: XR CHEST (2 VW)    CLINICAL HISTORY: HIP REPLACEMENT. PREOP.     COMPARISONS: NOVEMBER 12, 2019    FINDINGS: Osseous structures are intact. Cardiopericardial silhouette is normal. Aorta calcified. Pulmonary vasculature is normal. Lungs are clear. Impression  NO ACUTE CARDIOPULMONARY DISEASE. Portable: Results for orders placed during the hospital encounter of 11/12/19    XR CHEST PORTABLE    Narrative  XR CHEST PORTABLE    Exam Date/Time:  11/12/2019 3:30 PM  Clinical History:   CVA  Comparison:  7/20/2014    RESULT:    Lines, tubes, and devices:  None. Lungs and pleura:  No consolidation. No pleural effusion. No pneumothorax. Normal pulmonary vascular pattern. Cardiomediastinal silhouette:  Normal. Aortic atherosclerotic calcification. Other:  No acute osseous findings. Impression  No acute radiographic abnormality. Echo No results found for this or any previous visit. Assessment/Plan:    Active Hospital Problems    Diagnosis Date Noted    Diverticulitis [K57.92] 07/21/2021    Chest pain [R07.9] 07/20/2021    Hypotension [I95.9] 07/13/2021     Diverticulitis failed outpatient treat with oral antibiotics: Thankfully Bactrim and Flagyl combination does not promote any QT prolongation is Cipro and Flagyl 1 continue this regimen transition Flagyl to oral on discharge complete 1 week therapy    New onset A. fib with RVR: Check TSH. Flecainide started by cardiology avoid any QTC prolonging agents. Constipation: Resolved    Elevated troponin: Low concern for acute plaque rupture cardiology following  Hypotension: Hold hypertensive medications continue fluid resuscitation  Vascular dementia: Supportive care continue antiplatelet statin  COPD without exacerbation submental O2 as needed nebulized DuoNeb's  DVT prophylaxis Lovenox      Additional work up or/and treatment plan may be added today or then after based on clinical progression. I am managing a portion of pt care. Some medical issues are handled byother specialists.  Additional work up and treatment should be done in out pt setting by pt PCP and other out pt providers. In addition to examining and evaluating pt, I spent additional time explaining care, normaland abnormal findings, and treatment plan. All of pt questions were answered. Counseling, diet and education were provided. Case will be discussed with nursing staff when appropriate. Family will be updated if and whenappropriate.       Electronically signed by Layo Chan DO on 7/23/2021 at 7:49 PM

## 2021-07-23 NOTE — FLOWSHEET NOTE
Pt assessment and evening medications complete including Lactulose and Colace. Assisted pt to restroom for BM. States she had small BM.    0300- Notified by MR pt converted to afib, HR in 140's-150's. Assessed pt, asymptomatic, took new set of VS; BP= 150/101 (114), SV=783, O2=98% 2L NC. Re-applied/ adjusted tele leads for accuracy. Notified Dr. [facility] [employer], requested order for EKG and IV push Cardizem. Received order 20mg. Assisted pt to restroom. No BM.    0503- Notified Dr. [facility] [employer] Pt remains in afib, HR btwn 110's-130's. Received new order for additional IV push Cardizem 20mg. Administered to pt and assisted to restroom. Pt states she had small BM. Notified by MR pt converted back to NSR and HR in 80's. Notified Dr. [facility] [employer].

## 2021-07-23 NOTE — PROGRESS NOTES
[facility] OF THE [facility] Heart [place] Note      Patient:  Jayme Dykes  YOB: 1940  MRN: [id number]   Acct: [de-identified]  Admit Date:  7/20/2021  Primary Cardiologist:Dr Neptali Chavarria    [facility] Cardiologist: Binu Downs MD      Impression/Plan:     1. New onset persistent atrial fibrillation. In that she is completely asymptomatic even with rates of 160 for 3 hours it is possible she has had intermittent atrial fibrillation for some time. Clearly at risk of recurrent stroke having had a stroke in the past and with history of hypertension and age beyond 78 years putting her [id number] score in excess of 5. Discussed in detail with her and her son who is with her pros and cons of anticoagulation for stroke prevention and they are agreeable. Discussed potential bleeding as well. Additionally, it is unlikely she could tolerate high enough dose of beta-blocker to control the rate should it recur. Therefore we utilize flecainide as an antiarrhythmic. Discussed pros and cons of this agent with her and her son as well  2. Hypotension with history of hypertension: Blood pressure still on the low side will discontinue beta-blocker in favor of adding flecainide for A. fib control. Would utilize a different agent for hypertension should blood pressure increase  3. Need to monitor 48 hours with flecainide institution. 4. Hypokalemia: Could be a trigger needs to be corrected  5. Will need low-dose beta-blocker as blood pressure allows in setting of flecainide use    Chief Complaint/Active Symptoms: Admitted with hypotension as well as diverticulitis. Elevated troponin not felt related to significant cardiac issues. She remained stable from a cardiovascular standpoint until approximately 3 AM when patient noted to be atrial fibrillation rate 140-150. Cardizem administered intravenously and patient says he subsequently spontaneously converted back to sinus rhythm.     She said she believes she was awake this morning when she had A. fib but had absolutely no symptoms. At home she notes no irregularities of her heart. She denies dizziness lightheadedness or syncope. Importantly her son mentions she had a stroke about a year ago. Admitted 7/20/2021 secondary to relatively asymptomatic significant hypotension and was hydrated emergency room. Subsequent troponins minimally elevated not felt related to acute plaque rupture.     Review of Systems:   Somewhat unreliable but negative    CARDIAC HISTORY:  Vascular dementia  Hypertension  Hyperlipidemia  Lexiscan myocardial perfusion imaging 2/10/2020 normal LVEF 83%    Significant past medical history  Severe COPD on home oxygen  Diabetes  Prior CVA    Objective:   Vitals:    07/22/21 1937 07/23/21 0244 07/23/21 0550 07/23/21 0651   BP: 138/73 (!) 150/101  112/74   Pulse: 77 141  79   Resp: 18      Temp: 98.4 °F (36.9 °C)   98.2 °F (36.8 °C)   TempSrc: Oral      SpO2: 100% 95%  100%   Weight:   154 lb 5.2 oz (70 kg)    Height:          Wt Readings from Last 3 Encounters:   07/23/21 154 lb 5.2 oz (70 kg)   07/20/21 154 lb 9.6 oz (70.1 kg)   07/13/21 158 lb (71.7 kg)       TELEMETRY: normal sinus                             Rhythm Strip: Between approximately 2:15 AM and 5:30 AM was in atrial fibrillation with rapid ventricular response 150 bpm.    Physical Exam:  General Appearance: alert and oriented to person, place , in no acute distress  Cardiovascular: normal rate, regular rhythm, normal S1 and S2, no murmurs, rubs, clicks, or gallops,  no JVD  Pulmonary/Chest: clear to auscultation bilaterally- no wheezes, rales or rhonchi, normal air movement, no respiratory distress  Abdomen: soft, non-tender, non-distended, normal bowel sounds, no masses   Extremities: no cyanosis, clubbing or edema  Skin: warm and dry, no rash or erythema  Eyes: EOMI  Neck: supple and non-tender without mass, no thyromegaly   Neurological: alert, oriented person and place, normal speech, no focal findings or movement disorder noted poor insight    Allergies:  No Known Allergies     Medications:    enoxaparin  1 mg/kg Subcutaneous BID    metoprolol tartrate  25 mg Oral BID    lactated ringers bolus  500 mL Intravenous Once    sulfamethoxazole-trimethoprim  1 tablet Oral 2 times per day    atorvastatin  20 mg Oral Daily    donepezil  5 mg Oral Nightly    escitalopram  10 mg Oral Daily    metroNIDAZOLE  500 mg Intravenous Q8H    docusate sodium  100 mg Oral BID    aspirin  81 mg Oral Daily       ondansetron, 4 mg, Q8H PRN   Or  ondansetron, 4 mg, Q6H PRN  nitroGLYCERIN, 0.4 mg, Q5 Min PRN        Diagnostics:  EK2021 normal sinus rhythm minor nonspecific ST-T abnormality    Echo: 2021  Mild to moderate concentric left ventricular hypertrophy. Normal left ventricular size and function. Left ventricular ejection fraction is visually estimated at 65-70%. Impaired relaxation pattern of LV diastolic filling. Normal right ventricular chamber size and function. Mitral valve leaflets are mildly thickened with preserved leaflet   mobility. Trivial mitral regurgitation. Mildly thickened aortic valve leaflets with preserved leaflet mobility. Aortic valve appears to be tricuspid. Trivial tricuspid regurgitation with estimated RVSP of 16 mm Hg. Small anterior pericardial effusion. No hemodynamic evidence of cardiac tamponade. CXR: 2-view: Results for orders placed during the hospital encounter of 21    XR CHEST (2 VW)    Narrative  EXAMINATION: XR CHEST (2 VW)    CLINICAL HISTORY: HIP REPLACEMENT. PREOP. COMPARISONS: 2019    FINDINGS: Osseous structures are intact. Cardiopericardial silhouette is normal. Aorta calcified. Pulmonary vasculature is normal. Lungs are clear. Impression  NO ACUTE CARDIOPULMONARY DISEASE.              Lab Data:    Cardiac Enzymes:  Recent Labs     21  1715 21  2347 21  0504   TROPONINI 0.012* <0.010 <0.010     ProBNP:   Lab Results   Component Value Date    PROBNP 342 07/20/2014       CBC:   Recent Labs     07/21/21  0709 07/22/21  0807 07/23/21  0630   WBC 5.3 6.1 6.4   RBC 2.86* 2.96* 2.95*   HGB 8.8* 9.1* 8.9*   HCT 26.9* 27.7* 28.1*   * 479* 424*       CMP:    Recent Labs     07/21/21  0504 07/22/21  0807 07/23/21  0630   * 142 143   K 3.3* 3.7 3.2*   * 107 108*   CO2 27 26 26   BUN 10 7* 6*   CREATININE 0.54 0.45* 0.50   GFRAA >60.0 >60.0 >60.0   LABGLOM >60.0 >60.0 >60.0   GLUCOSE 95 113* 112*   CALCIUM 8.0* 8.6 8.0*       Hepatic Function Panel:  No results for input(s): ALKPHOS, ALT, AST, PROT, BILITOT, BILIDIR, IBILI, LABALBU in the last 72 hours. Magnesium:    Recent Labs     07/21/21  0504 07/23/21  0630   MG 2.0 2.0       PT/INR:  No results for input(s): PROTIME, INR in the last 72 hours. Lipids:    Recent Labs     07/21/21  0504   CHOL 102   TRIG 116   HDL 38*   LDLCALC 41       Active Problems:    Hypotension    Chest pain    Diverticulitis  Resolved Problems:    * No resolved hospital problems.  *           Electronically signed by Chayito Nolen MD on 7/23/2021 at 2:20 PM

## 2021-07-24 NOTE — PROGRESS NOTES
[id number] Progress Note      Patient:  Isaac Colon  YOB: 1940  MRN: [id number]   Acct: [de-identified]  Admit Date:  7/20/2021  Primary Cardiologist:Dr Ron Jackson Cardiologist: Teresa Granado MD      Impression/Plan:     1. New onset persistent atrial fibrillation. In that she is completely asymptomatic even with rates of 160 for 3 hours it is possible she has had intermittent atrial fibrillation for some time. Clearly at risk of recurrent stroke having had a stroke in the past and with history of hypertension and age beyond 78 years putting her [id number] score in excess of 5. Discussed in detail with her and her son who is with her pros and cons of anticoagulation for stroke prevention and they are agreeable. Discussed potential bleeding as well. Additionally, it is unlikely she could tolerate high enough dose of beta-blocker to control the rate should it recur. Therefore we utilize flecainide as an antiarrhythmic. Discussed pros and cons of this agent with her and her son as well  2. Tolerated flecainide and Eliquis overnight. Family is concerned that perhaps A. fib is related to antibiotic induced atrial fibrillation. This is a very rare phenomena she has a very high risk for atrial fibrillation in general and has had prior strokes possibly related to her having had intermittent A. fib in the past.  Therefore would continue flecainide and anticoagulation at this time. 3. Hypotension with history of hypertension: Normal blood pressure need to monitor 48 hours with flecainide institution. 4. Hypokalemia: Normal this a.m.  5. Probable home tomorrow    Chief Complaint/Active Symptoms: No angina/dyspnea/palpitations          Admitted with hypotension as well as diverticulitis. Elevated troponin not felt related to significant cardiac issues.   She remained stable from a cardiovascular standpoint until approximately 3 AM when patient noted to be atrial fibrillation rate 140-150. Cardizem administered intravenously and patient says he subsequently spontaneously converted back to sinus rhythm. She said she believes she was awake this morning when she had A. fib but had absolutely no symptoms. At home she notes no irregularities of her heart. She denies dizziness lightheadedness or syncope. Importantly her son mentions she had a stroke about a year ago. Admitted 7/20/2021 secondary to relatively asymptomatic significant hypotension and was hydrated emergency room. Subsequent troponins minimally elevated not felt related to acute plaque rupture. Review of Systems:   Somewhat unreliable but negative    CARDIAC HISTORY:  Vascular dementia  Hypertension  Hyperlipidemia  Lexiscan myocardial perfusion imaging 2/10/2020 normal LVEF 83%    Significant past medical history  Severe COPD on home oxygen  Diabetes  Prior CVA    Objective:   Vitals:    07/24/21 0053 07/24/21 0713 07/24/21 0800 07/24/21 0900   BP:  (!) 129/108     Pulse:  64  66   Resp:  16     Temp:  97.3 °F (36.3 °C)     TempSrc:  Oral     SpO2:  100% 100%    Weight: 160 lb 11.5 oz (72.9 kg)      Height: 5' (1.524 m)         Wt Readings from Last 3 Encounters:   07/24/21 160 lb 11.5 oz (72.9 kg)   07/20/21 154 lb 9.6 oz (70.1 kg)   07/13/21 158 lb (71.7 kg)       TELEMETRY: normal sinus                             Rhythm Strip: No recurrent a.fib last 24 hours. No jennifer.   Physical Exam:  General Appearance: alert and oriented to person, place , in no acute distress  Cardiovascular: normal rate, regular rhythm, normal S1 and S2, no murmurs, rubs, clicks, or gallops,  no JVD  Pulmonary/Chest: clear to auscultation bilaterally- no wheezes, rales or rhonchi, normal air movement, no respiratory distress  Abdomen: soft, non-tender, non-distended, normal bowel sounds, no masses   Extremities: no cyanosis, clubbing or edema  Skin: warm and dry, no rash or erythema  Eyes: EOMI  Neck: supple and non-tender without mass, input(s): CKTOTAL, CKMB, CKMBINDEX, TROPONINI in the last 72 hours. ProBNP:   Lab Results   Component Value Date    PROBNP 342 07/20/2014       CBC:   Recent Labs     07/22/21  0807 07/23/21  0630 07/24/21  0640   WBC 6.1 6.4 4.9   RBC 2.96* 2.95* 2.95*   HGB 9.1* 8.9* 8.9*   HCT 27.7* 28.1* 28.1*   * 424* 417*       CMP:    Recent Labs     07/22/21  0807 07/23/21  0630 07/24/21  0640    143 143   K 3.7 3.2* 4.2    108* 110*   CO2 26 26 25   BUN 7* 6* 6*   CREATININE 0.45* 0.50 0.54   GFRAA >60.0 >60.0 >60.0   LABGLOM >60.0 >60.0 >60.0   GLUCOSE 113* 112* 96   CALCIUM 8.6 8.0* 7.9*       Hepatic Function Panel:  No results for input(s): ALKPHOS, ALT, AST, PROT, BILITOT, BILIDIR, IBILI, LABALBU in the last 72 hours. Magnesium:    Recent Labs     07/23/21  0630   MG 2.0       PT/INR:  No results for input(s): PROTIME, INR in the last 72 hours. Lipids:    No results for input(s): CHOL, TRIG, HDL, LDLCALC, LABVLDL in the last 72 hours. Active Problems:    Hypotension    Chest pain    Diverticulitis  Resolved Problems:    * No resolved hospital problems.  *           Electronically signed by Yu Whitehead MD on 7/24/2021 at 2:09 PM

## 2021-07-24 NOTE — CARE COORDINATION
SPOKE WITH PATIENT AND SON PRESENT. IMM REVIEWED AND VERBALIZED UNDERSTANDING. [address] [facility] [employer]. HAD [facility] IN PAST FOR PT/OT WITH ORTHO SURGERY. FOC OFFERED AND CHOSE [facility].  WILL CALL CLOSER TO [place].

## 2021-07-24 NOTE — PROGRESS NOTES
flecainide  50 mg Oral 2 times per day    metoprolol succinate  12.5 mg Oral Daily    sulfamethoxazole-trimethoprim  1 tablet Oral 2 times per day    atorvastatin  20 mg Oral Daily    donepezil  5 mg Oral Nightly    escitalopram  10 mg Oral Daily    metroNIDAZOLE  500 mg Intravenous Q8H    docusate sodium  100 mg Oral BID     PRN Meds: ondansetron **OR** ondansetron, nitroGLYCERIN    Labs:   Recent Labs     07/22/21  0807 07/23/21  0630 07/24/21  0640   WBC 6.1 6.4 4.9   HGB 9.1* 8.9* 8.9*   HCT 27.7* 28.1* 28.1*   * 424* 417*     Recent Labs     07/22/21  0807 07/23/21  0630 07/24/21  0640    143 143   K 3.7 3.2* 4.2    108* 110*   CO2 26 26 25   BUN 7* 6* 6*   CREATININE 0.45* 0.50 0.54   CALCIUM 8.6 8.0* 7.9*     No results for input(s): AST, ALT, BILIDIR, BILITOT, ALKPHOS in the last 72 hours. No results for input(s): INR in the last 72 hours. No results for input(s): Makayla Belts in the last 72 hours. Urinalysis:   Lab Results   Component Value Date    NITRU Negative 07/20/2021    WBCUA 3-5 07/13/2021    BACTERIA Negative 07/13/2021    RBCUA 0-2 07/13/2021    BLOODU Negative 07/20/2021    SPECGRAV 1.013 07/20/2021    GLUCOSEU Negative 07/20/2021       Radiology:   Most recent    Chest CT      WITH CONTRAST:No results found for this or any previous visit. WITHOUT CONTRAST: No results found for this or any previous visit. CXR      2-view: Results for orders placed during the hospital encounter of 06/04/21    XR CHEST (2 VW)    Narrative  EXAMINATION: XR CHEST (2 VW)    CLINICAL HISTORY: HIP REPLACEMENT. PREOP. COMPARISONS: NOVEMBER 12, 2019    FINDINGS: Osseous structures are intact. Cardiopericardial silhouette is normal. Aorta calcified. Pulmonary vasculature is normal. Lungs are clear. Impression  NO ACUTE CARDIOPULMONARY DISEASE.        Portable: Results for orders placed during the hospital encounter of 11/12/19    XR CHEST PORTABLE    Narrative  XR CHEST Family will be updated if and whenappropriate.       Electronically signed by Lorenzo Terrell DO on 7/24/2021 at 5:28 PM

## 2021-07-24 NOTE — PROGRESS NOTES
Physical Therapy Med Surg Daily Treatment Note  Facility/Department: [address]  Room: [place] [id number]-       NAME: Waleska Neal  : 1940 (80 y.o.)  MRN: [id number]  CODE STATUS: Full Code    Date of Service: 2021    Patient Diagnosis(es): Chest pain [R07.9]  Hypotension [I95.9]  Diverticulitis [K57.92]   Chief Complaint   Patient presents with    Hypotension     Patient Active Problem List    Diagnosis Date Noted    Diverticulitis 2021    Chest pain 2021    Hypotension 2021    Anemia 2021    Acute diverticulitis 2021    Status post total hip replacement, left 2021    Ataxia 2021    Obesity (BMI 30-39.9) 2021    Irritable bowel syndrome without diarrhea 2020    Vascular dementia ([address] 2020    Anxiety 2020    Hypoxia 2020    Arthropathy of both hips 2019    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    Cerebrovascular accident (CVA) due to stenosis of left anterior cerebral artery ([address] 2019    Posterior rhinorrhea 2019    Dyspnea on exertion 2019    Essential (primary) hypertension 10/07/2013    Chronic obstructive pulmonary disease ([address]     Osteoarthritis     Age-related cataract 2011    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    Other intraretinal microvascular abnormalities 10/12/2010        Past Medical History:   Diagnosis Date    COPD (chronic obstructive pulmonary disease) ([address]     Dyspnea on exertion 2019    Hypertension     Hypotension 2021    Impaired cognition     Irritable bowel syndrome without diarrhea 2020    Mild cognitive impairment with memory loss 2020    Osteoarthritis     Tobacco abuse 2019    Tobacco use disorder 2019    Vascular dementia ([address] 2020     Past Surgical History:   Procedure Laterality Date    EYE SURGERY  2010    PER PROBLEM SHEET    HYSTERECTOMY  1975    PER PROBLEM SHEET    LITHOTRIPSY  01/01/2004    PER PROBLEM SHEET    TOTAL HIP ARTHROPLASTY Right 2019    TOTAL HIP ARTHROPLASTY Left 6/25/2021    LEFT HIP DIRECT ANTERIOR HIP REPLACEMENT performed by Antonio Silverman MD at [address]  01/01/1988    PER PROBLEM SHEET       Restrictions  Position Activity Restriction  Other position/activity restrictions: 1month post L JAME (anterior hip)    SUBJECTIVE   General  Chart Reviewed: Yes  Family / Caregiver Present: No  Subjective  Subjective: \"I guess I could get up. \"    Pre-Session Pain Report  Pre Treatment Pain Screening  Pain at present: 0  Intervention List: Patient able to continue with treatment  Pain Screening  Patient Currently in Pain: No       Post-Session Pain Report  Pain Assessment  Pain Level: 0         OBJECTIVE        Bed mobility  Rolling to Left: Modified independent  Supine to Sit: Modified independent  Scooting: Modified independent  Comment: Good technique, HOB slightly elevated, no use of HR    Transfers  Sit to Stand: Modified independent  Stand to sit: Modified independent  Comment: Good hand placement and safety    Ambulation  Ambulation?: Yes  Ambulation 1  Surface: level tile  Device: Rolling Walker  Assistance: Supervision  Quality of Gait: shortened step length, slight lateral sway, decreased vera heel strike, decreased weight acceptance to LLE. Distance: 10' x 3  Comments: Distance limtited to in room ambulation d/t pt declining to ambulate in hallway    Stairs/Curb  Stairs?: No (pt declines going out to o[place]os)    Neuromuscular Education  Neuromuscular Comments: Static standing balance with no UE support (FT, FA, Staggered stance BLE) Pt unable to maintain staggered stance with LLE advanced for 30\". Pt experiencing mild posterior LOB at 16 seconds. Able to self correct utilizing [employer].      Exercises  Comments: Standing sink ex's x10 ea         ASSESSMENT   Assessment: Unable to trial stair goal this session secondary to pt declining to ambulate in hallway. Standing sink ex HEP given and reviewed with pt. Pt prefers [employer] at this time secondary to feeling unsteady without. Declines to trial SPC this tx. Discharge Recommendations:  Continue to assess pending progress, Patient would benefit from continued therapy after discharge    Goals  Long term goals  Long term goal 1: Pt to complete HEP with indep  Long term goal 2: Pt to ambualte 150-300ft with SPC and indep  Long term goal 3: Pt to complete 4 steps with L LE leading indep    PLAN    Times per week: 2-3 visits/week  Plan Comment: Cont. POC  Safety Devices  Type of devices: Call light within reach, Left in chair, Nurse notified     [facility] (6 CLICK) [id number] Mobility Raw Score : 22     Therapy Time   Individual   Time In 0902   Time Out 0925   Minutes 23      BM/Trsf: 4  Gait: 8  NMR: 3  There ex: T[address], PTA, 07/24/21 at 9:31 AM         Definitions for assistance levels  Independent = pt does not require any physical supervision or assistance from another person for activity completion. Device may be needed.   Stand by assistance = pt requires verbal cues or instructions from another person, close to but not touching, to perform the activity  Minimal assistance= pt performs 75% or more of the activity; assistance is required to complete the activity  Moderate assistance= pt performs 50% of the activity; assistance is required to complete the activity  Maximal assistance = pt performs 25% of the activity; assistance is required to complete the activity  Dependent = pt requires total physical assistance to accomplish the task

## 2021-07-24 NOTE — FLOWSHEET NOTE
2130- Evening assessment and meds complete. Asked pt if she had additional bm's during the day. Pt states she had large BM and feels \"much better\". Asked pt if she is feeling any discomfort in abdominal region, pt states no and that she can push on area that was giving her discomfort and it is now soft w/o pain. Gave Colace per order and explained it is important to continue to have regular bowel movements. Pt stated her son brought in bottled water (seen on bedside table) and will be sure to stay hydrated. Assisted pt to restroom. Pt remains in NSR in 70's.

## 2021-07-25 NOTE — FLOWSHEET NOTE
200- Pt called nurse stating her left hand was swollen after IV Flagyl infusion. Pt's hand is swollen. Called pharmacist who states to elevate extremity and apply warn compress to site which was done. Pt denies any pain. Will monitor    1330- Swelling improving in left hand. Son at bedside    1530- DC instructions given to pt and sons, verbalized understanding of info.  Pt left floor via wc by transport with son Electronically signed by Megan Bravo RN on 7/25/2021 at 3:35 PM

## 2021-07-25 NOTE — PROGRESS NOTES
[facility] OF THE [facility] Heart Progress Note      Patient:  Siobhan Brock  YOB: 1940  MRN: [id number]   Acct: [de-identified]  Admit Date:  7/20/2021  Primary Cardiologist:Dr Lisa Slaughter    [facility] Cardiologist: Marc Duarte MD      Impression/Plan:     1. New onset persistent atrial fibrillation. In that she is completely asymptomatic even with rates of 160 for 3 hours it is possible she has had intermittent atrial fibrillation for some time. Clearly at risk of recurrent stroke having had a stroke in the past and with history of hypertension and age beyond 78 years putting her [id number] score in excess of 5. Discussed in detail with her and her son who is with her pros and cons of anticoagulation for stroke prevention and they are agreeable. Discussed potential bleeding as well. Additionally, it is unlikely she could tolerate high enough dose of beta-blocker to control the rate should it recur. 2. Tolerated flecainide and Eliquis >48 hours. Family is concerned that perhaps A. fib is related to antibiotic induced atrial fibrillation. This is a very rare phenomena and she has a very high risk for atrial fibrillation in general and has had prior strokes possibly related to her having had intermittent A. fib in the past.  Therefore would continue flecainide and anticoagulation at this time. I did review with her and her son that hemorrhoidal bleeding could worsen with continued use of full anticoagulation and family will continue to monitor this. 3. Blood pressure well controlled. 4. Hypokalemia: Normal this a.m.  5. Home today we will arrange [address] follow-up this coming week reviewed with Dr. Oneill Persons    Chief Complaint/Active Symptoms: No angina/dyspnea/palpitations. She says she has slight blood on the toilet paper and has a history of hemorrhoids. Otherwise no overt bleeding          Admitted with hypotension as well as diverticulitis.   Elevated troponin not felt related to significant cardiac issues. She remained stable from a cardiovascular standpoint until approximately 3 AM when patient noted to be atrial fibrillation rate 140-150. Cardizem administered intravenously and patient says he subsequently spontaneously converted back to sinus rhythm. She said she believes she was awake this morning when she had A. fib but had absolutely no symptoms. At home she notes no irregularities of her heart. She denies dizziness lightheadedness or syncope. Importantly her son mentions she had a stroke about a year ago. Admitted 7/20/2021 secondary to relatively asymptomatic significant hypotension and was hydrated emergency room. Subsequent troponins minimally elevated not felt related to acute plaque rupture. Review of Systems:   Somewhat unreliable but negative    CARDIAC HISTORY:  Vascular dementia  Hypertension  Hyperlipidemia  Lexiscan myocardial perfusion imaging 2/10/2020 normal LVEF 83%    Significant past medical history  Severe COPD on home oxygen  Diabetes  Prior CVA    Objective:   Vitals:    07/24/21 0900 07/24/21 1412 07/24/21 1931 07/25/21 0654   BP:  (!) 158/77 130/72 123/82   Pulse: 66 71 66 65   Resp:  16 18 18   Temp:  98.1 °F (36.7 °C) 98.6 °F (37 °C) 97.9 °F (36.6 °C)   TempSrc:  Oral Oral Oral   SpO2:  100% 99% 100%   Weight:       Height:          Wt Readings from Last 3 Encounters:   07/24/21 160 lb 11.5 oz (72.9 kg)   07/20/21 154 lb 9.6 oz (70.1 kg)   07/13/21 158 lb (71.7 kg)       TELEMETRY: normal sinus                             Rhythm Strip: No recurrent a.fib last 24 hours. No jennifer. Ave hr 60.  Now at 58 sinus  Physical Exam:  General Appearance: alert and oriented to person, place , in no acute distress  Cardiovascular: normal rate, regular rhythm, normal S1 and S2, no murmurs, rubs, clicks, or gallops,  no JVD  Pulmonary/Chest: clear to auscultation bilaterally- no wheezes, rales or rhonchi, normal air movement, no respiratory distress  Abdomen: soft, non-tender, non-distended, normal bowel sounds, no masses   Extremities: no cyanosis, clubbing or edema  Skin: warm and dry, no rash or erythema  Eyes: EOMI  Neck: supple and non-tender without mass, no thyromegaly   Neurological: alert, oriented person and place, normal speech, no focal findings or movement disorder noted poor insight    Allergies:  No Known Allergies     Medications:    cinnamon oil  1 drop Oral 4x Daily    apixaban  5 mg Oral BID    flecainide  50 mg Oral 2 times per day    metoprolol succinate  12.5 mg Oral Daily    sulfamethoxazole-trimethoprim  1 tablet Oral 2 times per day    atorvastatin  20 mg Oral Daily    donepezil  5 mg Oral Nightly    escitalopram  10 mg Oral Daily    metroNIDAZOLE  500 mg Intravenous Q8H    docusate sodium  100 mg Oral BID       ondansetron, 4 mg, Q8H PRN   Or  ondansetron, 4 mg, Q6H PRN  nitroGLYCERIN, 0.4 mg, Q5 Min PRN        Diagnostics:   normal QRS and QTc Borderline increased for 70 ms  7/20/2021 normal sinus rhythm minor nonspecific ST-T abnormality    Echo: 7/20/2021  Mild to moderate concentric left ventricular hypertrophy. Normal left ventricular size and function. Left ventricular ejection fraction is visually estimated at 65-70%. Impaired relaxation pattern of LV diastolic filling. Normal right ventricular chamber size and function. Mitral valve leaflets are mildly thickened with preserved leaflet   mobility. Trivial mitral regurgitation. Mildly thickened aortic valve leaflets with preserved leaflet mobility. Aortic valve appears to be tricuspid. Trivial tricuspid regurgitation with estimated RVSP of 16 mm Hg. Small anterior pericardial effusion. No hemodynamic evidence of cardiac tamponade. CXR: 2-view: Results for orders placed during the hospital encounter of 06/04/21    XR CHEST (2 VW)    Narrative  EXAMINATION: XR CHEST (2 VW)    CLINICAL HISTORY: HIP REPLACEMENT. PREOP.     COMPARISONS: NOVEMBER 12, 2019    FINDINGS: Osseous structures are intact. Cardiopericardial silhouette is normal. Aorta calcified. Pulmonary vasculature is normal. Lungs are clear. Impression  NO ACUTE CARDIOPULMONARY DISEASE. Lab Data:    Cardiac Enzymes:  No results for input(s): CKTOTAL, CKMB, CKMBINDEX, TROPONINI in the last 72 hours. ProBNP:   Lab Results   Component Value Date    PROBNP 342 07/20/2014       CBC:   Recent Labs     07/23/21  0630 07/24/21  0640 07/25/21  0627   WBC 6.4 4.9 4.8   RBC 2.95* 2.95* 3.07*   HGB 8.9* 8.9* 9.3*   HCT 28.1* 28.1* 29.0*   * 417* 437*       CMP:    Recent Labs     07/23/21  0630 07/24/21  0640 07/25/21  0627    143 142   K 3.2* 4.2 4.3   * 110* 108*   CO2 26 25 26   BUN 6* 6* 7*   CREATININE 0.50 0.54 0.57   GFRAA >60.0 >60.0 >60.0   LABGLOM >60.0 >60.0 >60.0   GLUCOSE 112* 96 93   CALCIUM 8.0* 7.9* 7.9*       Hepatic Function Panel:  No results for input(s): ALKPHOS, ALT, AST, PROT, BILITOT, BILIDIR, IBILI, LABALBU in the last 72 hours. Magnesium:    Recent Labs     07/23/21  0630   MG 2.0       PT/INR:  No results for input(s): PROTIME, INR in the last 72 hours. Lipids:    No results for input(s): CHOL, TRIG, HDL, LDLCALC, LABVLDL in the last 72 hours. Active Problems:    Hypotension    Chest pain    Diverticulitis  Resolved Problems:    * No resolved hospital problems.  *           Electronically signed by Marquis Isabel MD on 7/25/2021 at 12:31 PM

## 2021-07-25 NOTE — DISCHARGE SUMMARY
Hospital Medicine Discharge Summary    Rylee Jackson  :  1940  MRN:  [id number]    Admit date:  2021  Discharge date:  2021    Admitting Physician:  Dalton Miller DO  Primary Care Physician:  Rolando Hernandez MD      Discharge Diagnoses: Active Problems:    Hypotension    Chest pain    Diverticulitis  Resolved Problems:    * No resolved hospital problems. *  Recurrent diverticulitis failed outpatient treatment. New onset atrial fibrillation. Dehydration hypovolemia. Thrush. Constipation. Hospital Course: Rylee Jackson is a 80 y.o. female that was admitted and treated at [email or web address] for persistent hypotension left lower quadrant abdominal pain. Patient was referred to emergency department from her PCP office for persistent hypotension. Patient previously been treated with Zosyn followed by Augmentin for diverticulitis. On arrival to the emergency department the patient was fluid responsive to several liters of IV fluid however had persistent left lower quadrant abdominal pain. Repeat CT scan showed persistent inflammation and irritation around the area and the patient was started on initially regimen of Cipro and Flagyl. Antibiotic regimen was changed to Bactrim and Flagyl which the patient tolerated well with a rapid improvement in left lower quadrant pain. While monitoring for improvement the patient developed new onset atrial fibrillation. Given the patient's history of multiple CVAs in the past cardiology was consulted recommended antiarrhythmic with flecainide and anticoagulation with Eliquis. This will be continued at the time of discharge. Patient will be followed up by both cardiology as well as the PCP. Patient is extensively educated to the need for compliance with her anticoagulation to complete all antibiotics and follow-up with PCP.   Of note the patient did have some thrush likely secondary to the multiple recurrent rounds of antibiotics and was started on oral nystatin swish and swallow. She did not tolerate cinnamon oil rinses        Patient was seen by the following consultants while admitted to [facility]:   Consults:  IP CONSULT TO HOSPITALIST  IP CONSULT TO CARDIOLOGY  IP CONSULT TO CARDIOLOGY  IP CONSULT TO CARDIOLOGY  IP CONSULT TO HOME CARE NEEDS    Physical Exam:   Constitutional: alert, appears stated age and cooperative  Skin: Skin color, texture, turgor normal. No rashes or lesions  Eyes:Eye: Normal external eye, conjunctiva, TONIA. ENT: Head: Normocephalic, no lesions, without obvious abnormality. Neck: no adenopathy, no carotid bruit, no JVD, supple, symmetrical, trachea midline and thyroid not enlarged, symmetric, no tenderness/mass/nodules  Respiratory: Decreased breath sounds throughout with no wheezes rales or rhonchi  Cardiovascular: regular rate and rhythm, MICHAELA 1 out of 6 click, rub or gallop  Gastrointestinal: soft, non-tender; bowel sounds normal; no masses,  no organomegaly left lower quadrant tenderness no longer present  Genitourinary: Deferred  Musculoskeletal:extremities normal, atraumatic, no cyanosis or edema  Neurologic: Mental status AAOx person situation and time not to place directly no facial asymmetry or droop. Normal muscle strength b/l. Psychiatric: Appropriate mood and affect. Good     Significant Diagnostic Studies: ECHO   Summary   Mild to moderate concentric left ventricular hypertrophy. Normal left ventricular size and function. Left ventricular ejection fraction is visually estimated at 65-70%. Impaired relaxation pattern of LV diastolic filling. Normal right ventricular chamber size and function. Mitral valve leaflets are mildly thickened with preserved leaflet   mobility. Trivial mitral regurgitation. Mildly thickened aortic valve leaflets with preserved leaflet mobility. Aortic valve appears to be tricuspid.    Trivial tricuspid regurgitation with estimated RVSP of 16 mm Hg. Small anterior pericardial effusion. No hemodynamic evidence of cardiac tamponade.     Discharge Medications:       Zulema Domínguez   Home Medication Instructions [facility]:[id number]    Printed on:07/25/21 4067   Medication Information                      albuterol sulfate  (90 Base) MCG/ACT inhaler  Inhale 2 puffs into the lungs every morning (before breakfast)             alendronate (FOSAMAX) 70 MG tablet  Take 1 tablet by mouth every 7 days             apixaban (ELIQUIS) 5 MG TABS tablet  Take 1 tablet by mouth 2 times daily             atorvastatin (LIPITOR) 20 MG tablet  Take 1 tablet by mouth daily             coenzyme Q10 100 MG CAPS capsule  Take 100 mg by mouth 2 times daily              docusate sodium (COLACE, DULCOLAX) 100 MG CAPS  Take 100 mg by mouth 2 times daily             donepezil (ARICEPT) 5 MG tablet  Take 1 tablet by mouth nightly             escitalopram (LEXAPRO) 10 MG tablet  Take 1 tablet by mouth daily             flecainide (TAMBOCOR) 50 MG tablet  Take 1 tablet by mouth every 12 hours             fluticasone (FLONASE) 50 MCG/ACT nasal spray  USE 2 SPRAYS NASALLY DAILY             metoprolol succinate (TOPROL XL) 25 MG extended release tablet  Take 0.5 tablets by mouth daily             metroNIDAZOLE (FLAGYL) 500 MG tablet  Take 1 tablet by mouth 3 times daily for 7 days             nystatin (MYCOSTATIN) 320257 UNIT/ML suspension  Take 5 mLs by mouth 4 times daily for 10 days Retain in mouth as long as possible             OXYGEN  Start oxygen therapy at 2 lit with activity and sleep , give POC for ambulation     Dx COPD             sulfamethoxazole-trimethoprim (BACTRIM DS;SEPTRA DS) 800-160 MG per tablet  Take 1 tablet by mouth every 12 hours for 7 days             umeclidinium-vilanterol (ANORO ELLIPTA) 62.5-25 MCG/INH AEPB inhaler  Inhale 1 puff into the lungs daily             vitamin B-12 (CYANOCOBALAMIN) 500 MCG tablet  Take 500 mcg by mouth daily             vitamin D (CHOLECALCIFEROL) 1000 UNIT TABS tablet  Take 1,000 Int'l Units by mouth daily. Disposition:   Discharged to Home. Any C needs that were indicated and/or required as been addressed and set up by Social Work. Condition at discharge: Pt was medically stable at the time of discharge. Activity: activity as tolerated    Total time taken for discharging this patient: 40 minutes. Greater than 70% of time was spent focused exclusively on this patient. Time was taken to review chart, discuss plans with consultants, reconciling medications, discussing plan answering questions with patient.      Mercedes Barrera DO  7/25/2021, 5:46 PM

## 2021-07-26 NOTE — PROGRESS NOTES
Physical Therapy  Facility/Department: [facility] [id number]/[id number]  Physical Therapy Discharge      NAME: Aye Vela    : 1940 (80 y.o.)  MRN: [id number]    Account: [de-identified]  Gender: female      Patient has been discharged from acute care hospital. DC patient from current PT program.      Electronically signed by Alan Mortensen PT on 21 at 12:39 PM EDT

## 2021-07-26 NOTE — CARE COORDINATION
[facility] Transitions Initial Follow Up Call    Call within 2 business days of discharge: Yes    Patient: Markus Sheltoner Patient : 1940   MRN: <[id number]>  Reason for Admission:Hypotension    Chest pain    Diverticulitis  Discharge Date: 21 RARS: Readmission Risk Score: 18      Last Discharge [facility]       Complaint Diagnosis Description Type Department Provider    21 Hypotension Elevated troponin . .. ED to Hosp-Admission (Discharged) (ADMITTED) [facility],            Spoke with: Call to Emil Lyons. She states she is feeling fine. Denies abdominal pain. Denies dyspnea. Uses oxygen 2lnc continuously. Appetite is good. Denies any issues with bowel and bladder. 1111F medication reconciliation completed. Pt. Has [address] for SN. Called to [address] to verify [facility]- SOC was completed on 21. No new issues or concerns. Reviewed upcoming appts. In August. Pt. States her son takes care of her appt. Schedule and transports. Will continue to follow. Transitions of Care Initial Call    Was this an external facility discharge? No Discharge Facility: n/a    Challenges to be reviewed by the provider   Additional needs identified to be addressed with provider: No  none             Method of communication with provider : none      Advance Care Planning:   Does patient have an Advance Directive: reviewed and current. Was this a readmission? Yes  Patient stated reason for admission: hypotension  Patients top risk factors for readmission: functional physical ability and medical condition-HYPOTENSION  CHEST PAIN    Care Transition Nurse (CTN) contacted the patient by telephone to perform post hospital discharge assessment. Verified name and  with patient as identifiers. Provided introduction to self, and explanation of the CTN role. CTN reviewed discharge instructions, medical action plan and red flags with patient who verbalized understanding.  Patient given an opportunity to ask questions and does not have any further questions or concerns at this time. Were discharge instructions available to patient? Yes. Reviewed appropriate site of care based on symptoms and resources available to patient including: PCP, Specialist, Home health, When to call 911 and [address]. The patient agrees to contact the PCP office for questions related to their healthcare. Medication reconciliation was performed with patient, who verbalizes understanding of administration of home medications. Advised obtaining a 90-day supply of all daily and as-needed medications. Covid Risk Education     Educated patient about risk for severe COVID-19 due to risk factors according to CDC guidelines. LPN CC reviewed discharge instructions, medical action plan and red flag symptoms with the patient who verbalized understanding. Discussed COVID vaccination status: No. Education provided on COVID-19 vaccination as appropriate. Discussed exposure protocols and quarantine with CDC Guidelines. Patient was given an opportunity to verbalize any questions and concerns and agrees to contact LPN CC or health care provider for questions related to their healthcare. Reviewed and educated patient on any new and changed medications related to discharge diagnosis. Was patient discharged with a pulse oximeter? No Discussed and confirmed pulse oximeter discharge instructions and when to notify provider or seek emergency care. LPN CC provided contact information. Plan for follow-up call in 5-7 days based on severity of symptoms and risk factors.   Plan for next call: symptom management-[address]: [address]  Non-face-to-face services provided:  Obtained and reviewed discharge summary and/or continuity of care documents  Communication with home health agencies or other community services the patient is currently using-[facility]    Care Transitions 24 Hour Call    Care Transitions Interventions         Follow Up  Future Appointments   Date Time Provider Lily Mathis   8/5/2021 11:00 AM YRN Garcia [place] [facility]   8/16/2021 11:30 AM Latoya Sibley MD [facility]   9/18/2021  9:00 AM [place] ULTRASOUND 2 MLOZ ULTRA MOLZ Fac RAD   10/21/2021  3:30 PM Randy Kirkland MD [facility]   11/30/2021  3:30 PM Sharen Kawasaki, MD [facility] [address] [facility] [address], [address] [facility] Care Transitions Nurse   [phone]

## 2021-07-29 PROBLEM — I48.19 PERSISTENT ATRIAL FIBRILLATION (HCC): Chronic | Status: ACTIVE | Noted: 2021-01-01

## 2021-07-29 NOTE — PROGRESS NOTES
Post-Discharge Transitional Care Management Services or Hospital Follow Up      Ashley Collins   YOB: 1940    Date of Office Visit:  7/29/2021  Date of Hospital Admission: 7/20/21  Date of Hospital Discharge: 7/25/21  Readmission Risk Score(high >=14%. Medium >=10%):Readmission Risk Score: 18      Care management risk score Rising risk (score 2-5) and Complex Care (Scores >=6): 5     Non face to face  following discharge, date last encounter closed (first attempt may have been earlier): 7/26/2021 12:01 PM 7/26/2021 12:01 PM    Call initiated 2 business days of discharge: Yes     Patient Active Problem List   Diagnosis    Chronic obstructive pulmonary disease ([address]    Osteoarthritis    Essential (primary) hypertension    Cystoid macular retinal degeneration    Dyspnea on exertion    Other intraretinal microvascular abnormalities    Age-related cataract    Posterior rhinorrhea    Cerebrovascular accident (CVA) due to stenosis of left anterior cerebral artery ([address]    Macular degeneration of right eye    Arthropathy of both hips    Hypoxia    Anxiety    Vascular dementia ([address]    Irritable bowel syndrome without diarrhea    Obesity (BMI 30-39. 9)    Ataxia    Status post total hip replacement, left    Hypotension    Anemia    Acute diverticulitis    Chest pain    Diverticulitis    Persistent atrial fibrillation (HCC)       No Known Allergies    Medications listed as ordered at the time of discharge from hospital   [facility] Medication Instructions SHAKIRA:    Printed on:07/29/21 1141   Medication Information                      albuterol sulfate  (90 Base) MCG/ACT inhaler  Inhale 2 puffs into the lungs every morning (before breakfast)             alendronate (FOSAMAX) 70 MG tablet  Take 1 tablet by mouth every 7 days             apixaban (ELIQUIS) 5 MG TABS tablet  Take 1 tablet by mouth 2 times daily             atorvastatin (LIPITOR) 20 MG tablet  Take 1 Unfortunately Patrick has an allergic reaction to azithromycin  Stop Azithromycin and take 5 ml of benadryl every 4-6 hours as needed  On exam his right ear drum looks great and there is no need for another antibiotic at this time  tablet by mouth daily             coenzyme Q10 100 MG CAPS capsule  Take 100 mg by mouth 2 times daily              docusate sodium (COLACE, DULCOLAX) 100 MG CAPS  Take 100 mg by mouth 2 times daily             donepezil (ARICEPT) 5 MG tablet  Take 1 tablet by mouth nightly             escitalopram (LEXAPRO) 10 MG tablet  Take 1 tablet by mouth daily             flecainide (TAMBOCOR) 50 MG tablet  Take 1 tablet by mouth every 12 hours             fluticasone (FLONASE) 50 MCG/ACT nasal spray  USE 2 SPRAYS NASALLY DAILY             metoprolol succinate (TOPROL XL) 25 MG extended release tablet  Take 0.5 tablets by mouth daily             metroNIDAZOLE (FLAGYL) 500 MG tablet  Take 1 tablet by mouth 3 times daily for 7 days             nystatin (MYCOSTATIN) 744552 UNIT/ML suspension  Take 5 mLs by mouth 4 times daily for 10 days Retain in mouth as long as possible             OXYGEN  Start oxygen therapy at 2 lit with activity and sleep , give POC for ambulation     Dx COPD             sulfamethoxazole-trimethoprim (BACTRIM DS;SEPTRA DS) 800-160 MG per tablet  Take 1 tablet by mouth every 12 hours for 7 days             umeclidinium-vilanterol (ANORO ELLIPTA) 62.5-25 MCG/INH AEPB inhaler  Inhale 1 puff into the lungs daily             vitamin B-12 (CYANOCOBALAMIN) 500 MCG tablet  Take 500 mcg by mouth daily             vitamin D (CHOLECALCIFEROL) 1000 UNIT TABS tablet  Take 1,000 Int'l Units by mouth daily.                      Medications marked \"taking\" at this time  Outpatient Medications Marked as Taking for the 7/29/21 encounter (Office Visit) with Donna Davis MD   Medication Sig Dispense Refill    flecainide (TAMBOCOR) 50 MG tablet Take 1 tablet by mouth every 12 hours 60 tablet 3    apixaban (ELIQUIS) 5 MG TABS tablet Take 1 tablet by mouth 2 times daily 60 tablet 2    metoprolol succinate (TOPROL XL) 25 MG extended release tablet Take 0.5 tablets by mouth daily 30 tablet 3    docusate sodium (COLACE, DULCOLAX) 100 MG CAPS Take 100 mg by mouth 2 times daily 60 capsule 2    metroNIDAZOLE (FLAGYL) 500 MG tablet Take 1 tablet by mouth 3 times daily for 7 days 21 tablet 0    sulfamethoxazole-trimethoprim (BACTRIM DS;SEPTRA DS) 800-160 MG per tablet Take 1 tablet by mouth every 12 hours for 7 days 14 tablet 0    nystatin (MYCOSTATIN) 966681 UNIT/ML suspension Take 5 mLs by mouth 4 times daily for 10 days Retain in mouth as long as possible 200 mL 0    vitamin B-12 (CYANOCOBALAMIN) 500 MCG tablet Take 500 mcg by mouth daily      fluticasone (FLONASE) 50 MCG/ACT nasal spray USE 2 SPRAYS NASALLY DAILY 16 g 5    donepezil (ARICEPT) 5 MG tablet Take 1 tablet by mouth nightly 90 tablet 4    umeclidinium-vilanterol (ANORO ELLIPTA) 62.5-25 MCG/INH AEPB inhaler Inhale 1 puff into the lungs daily 90 puff 1    alendronate (FOSAMAX) 70 MG tablet Take 1 tablet by mouth every 7 days 12 tablet 4    atorvastatin (LIPITOR) 20 MG tablet Take 1 tablet by mouth daily 90 tablet 4    escitalopram (LEXAPRO) 10 MG tablet Take 1 tablet by mouth daily 90 tablet 1    albuterol sulfate  (90 Base) MCG/ACT inhaler Inhale 2 puffs into the lungs every morning (before breakfast) (Patient taking differently: Inhale 2 puffs into the lungs every 4 hours as needed ) 1 Inhaler 3    OXYGEN Start oxygen therapy at 2 lit with activity and sleep , give POC for ambulation     Dx COPD 1 Units 0    coenzyme Q10 100 MG CAPS capsule Take 100 mg by mouth 2 times daily       vitamin D (CHOLECALCIFEROL) 1000 UNIT TABS tablet Take 1,000 Int'l Units by mouth daily. Medications patient taking as of now reconciled against medications ordered at time of hospital discharge: Yes    Chief Complaint   Patient presents with   [address] from Hospital     d/c 7/25/2021        HPI    Inpatient course: Discharge summary reviewed- see chart.  ANd see below for d/c summary:  Hospital Medicine Discharge Summary     Gina Section :  1940                     MRN:  [id number]     Admit date:  2021                      Discharge date:  2021     Admitting Physician:  Krishan Holley DO  Primary Care Physician:  Megan Duran MD        Discharge Diagnoses: Active Problems:    Hypotension    Chest pain    Diverticulitis  Resolved Problems:    * No resolved hospital problems. *  Recurrent diverticulitis failed outpatient treatment. New onset atrial fibrillation. Dehydration hypovolemia. Thrush. Constipation.     Hospital Course: Baldo Donohue is a 80 y.o. female that was admitted and treated at [facility] for persistent hypotension left lower quadrant abdominal pain. Patient was referred to emergency department from her PCP office for persistent hypotension. Patient previously been treated with Zosyn followed by Augmentin for diverticulitis. On arrival to the emergency department the patient was fluid responsive to several liters of IV fluid however had persistent left lower quadrant abdominal pain. Repeat CT scan showed persistent inflammation and irritation around the area and the patient was started on initially regimen of Cipro and Flagyl. Antibiotic regimen was changed to Bactrim and Flagyl which the patient tolerated well with a rapid improvement in left lower quadrant pain. While monitoring for improvement the patient developed new onset atrial fibrillation. Given the patient's history of multiple CVAs in the past cardiology was consulted recommended antiarrhythmic with flecainide and anticoagulation with Eliquis. This will be continued at the time of discharge. Patient will be followed up by both cardiology as well as the PCP. Patient is extensively educated to the need for compliance with her anticoagulation to complete all antibiotics and follow-up with PCP.   Of note the patient did have some thrush likely secondary to the multiple recurrent rounds of antibiotics and was started on oral nystatin swish and swallow. She did not tolerate cinnamon oil rinses    Interval history/Current status:     Diverticulitis - On Bactrim and Flagyl x 4 more days. LLQ pain and cramping is intermittent. Occasional BRB on TP with \"forceful\" bowel movement. No fevers. Hypotension/dehydratin - no dizziness. Feels weak and fatigued. Drinks 3 bottles of water a day. BP at home has been normal.    New Onset A fib - on flecainide and apixaban followed by [facility]. Had appt today and was in NSR. Appetite is improving now that she can taste better. Review of Systems See above    Vitals:    07/29/21 1049   BP: 106/62   Site: Right Upper Arm   Position: Sitting   Cuff Size: Large Adult   Pulse: 61   Resp: 17   Temp: 96.2 °F (35.7 °C)   TempSrc: Temporal   SpO2: 95%   Weight: 157 lb 12.8 oz (71.6 kg)   Height: 5' (1.524 m)     Body mass index is 30.82 kg/m². Wt Readings from Last 3 Encounters:   07/29/21 157 lb 12.8 oz (71.6 kg)   07/24/21 160 lb 11.5 oz (72.9 kg)   07/20/21 154 lb 9.6 oz (70.1 kg)     BP Readings from Last 3 Encounters:   07/29/21 106/62   07/25/21 123/82   07/20/21 80/60       Physical Exam  Constitutional:       General: She is not in acute distress. Appearance: She is well-developed. She is ill-appearing ( chronically, appears fatigued). HENT:      Head: Normocephalic and atraumatic. Eyes:      General: No scleral icterus. Conjunctiva/sclera: Conjunctivae normal.   Cardiovascular:      Rate and Rhythm: Normal rate and regular rhythm. Heart sounds: Normal heart sounds. Pulmonary:      Effort: No respiratory distress. Breath sounds: No wheezing or rales. Skin:     General: Skin is warm and dry. Coloration: Skin is pale. Neurological:      Mental Status: She is alert and oriented to person, place, and time.       Comments: Hypophonic, slow gait though steady using walker             Assessment/Plan:  Deangelo Sandoval was seen today for follow-up from hospital.    Diagnoses and all orders for this visit:    Hypotension, unspecified hypotension type  Comments:  related to dehydration, diverticulitis and possible paroxysmal a fib. Resolved. Orders:  -     [place] DISCHARGE MEDS RECONCILED W/ CURRENT OUTPATIENT MED LIST    Acute diverticulitis  Comments:  Improving on Bactrim and Flagyl. TO ER for increased pain, fever, nausea, vomiting. Has been reffered to GI  Orders:  -     [place] DISCHARGE MEDS RECONCILED W/ CURRENT OUTPATIENT MED LIST    Vascular dementia without behavioral disturbance ([facility]  Comments:  Improving with improving health condition, fair. No change to meds  Orders:  -     [place] DISCHARGE MEDS RECONCILED W/ CURRENT OUTPATIENT MED LIST    Chest pain, unspecified type  Comments:  Resolved. Orders:  -     [place] DISCHARGE MEDS RECONCILED W/ CURRENT OUTPATIENT MED LIST    Persistent atrial fibrillation ([facility]  Comments: In NSR. Improving, well-controlled. Followed by [facility].             Medical Decision Making: high complexity

## 2021-07-30 NOTE — TELEPHONE ENCOUNTER
Jeannette Crowe from [facility] called and is wondering do you have any upcoming labs patient may need done. I looked in chart and didn't see any.      Please advise and thank you

## 2021-08-03 NOTE — CARE COORDINATION
condition-hypotension chest pain diverticulitis  Interventions to address risk factors: Obtained and reviewed discharge summary and/or continuity of care documents      Non-[facility] follow up appointment(s): n/a    CTN provided contact information for future needs. Plan for follow-up call in 7-10 days based on severity of symptoms and risk factors. Plan for next call: symptom management-chest pain  dyspnea   diverticulits and follow up appointment-on 8/5/21          Care Transitions Subsequent and Final Call    Subsequent and Final Calls  Do you currently have any active services?: Yes  Care Transitions Interventions  Other Interventions:            Follow Up  Future Appointments   Date Time Provider Lily Danielsoni   8/5/2021 11:00 AM YRN [facility] - [facility] [id number] [place] [address]   8/16/2021 11:30 AM Sivan Arroyo MD [facility]   9/18/2021  9:00 AM [place] [facility]TR[facility] 2 [facility] Fac RAD   10/21/2021  3:30 PM Priscila Langley MD  [address]   11/30/2021  3:30 PM Gage Sandy MD [facility] [address] [facility] [address], [address] [facility] [address] Care Transitions Nurse   [phone]

## 2021-08-05 PROBLEM — R10.32 LEFT LOWER QUADRANT ABDOMINAL PAIN: Status: ACTIVE | Noted: 2021-01-01

## 2021-08-05 PROBLEM — K64.9 HEMORRHOIDS: Status: ACTIVE | Noted: 2021-01-01

## 2021-08-05 PROBLEM — Z86.79 HISTORY OF ATRIAL FIBRILLATION: Status: ACTIVE | Noted: 2021-01-01

## 2021-08-05 NOTE — PROGRESS NOTES
Gastroenterology Clinic Visit    Clint Zavala  [id number]  Chief Complaint   Patient presents with    Follow-up     diverticulitis     HPI: 80 y.o. female with history of chronic respiratory failure with hypoxia (2 to 3 L nasal cannula at home), TIA/CVA, HTN, vascular dementia, recent left hip replacement (June 25, 2021) presents to the GI clinic, accompanied by her son, Uri Villa, as a hospital follow up for diverticulitis. Per the patient and her son, patient began experiencing left lower quadrant pain around May of this year. At first, they attributed this pain to left hip pain as patient has history of bilateral hip arthropathy with history of right hip replacement and known need to have the left hip replaced as well. She underwent left hip replacement June 25, 2021 and was sent home to recover. During a follow-up from her hip replacement she was sent to the ER due to increasing fatigue, hypotension and was found to be anemic in the ER. CT scan on 7/13/21 was suspicious for acute diverticulitis so she was admitted to the hospital and treated accordingly. She was discharged home on p.o. Augmentin. Again, in an outpatient follow-up visit she was noted to be extremely fatigued with hypotension so she was sent to the ER again and admitted. Repeat CT scan on 7/21/2021 revealed some mild stranding and thickening of the rectosigmoid portion of the colon, she was given more antibiotics and sent home on Bactrim and Flagyl. Finished her antibiotic treatment yesterday, 8/4/2021. Currently, she endorses a daily formed/brown bowel movement. States her abdominal pain has improved significantly. She still endorses a left lower quadrant cramp/ache that occurs intermittently around once per week. She denies GERD symptoms, nausea/vomiting, hematemesis, hematochezia, melena, or weight loss. She does endorse some bright red blood on the toilet paper after a bowel movement and attributes this to her hemorrhoids.   Patient is a former smoker, denies alcohol or illicit drug use, denies aspirin or NSAID use. Of note, during her second hospital stay she was noted to have new onset atrial fibrillation and was discharged home on flecainide and Eliquis. No family hx of colon cancer. Previous GI work up/Endoscopic investigations:   Patient states she is unsure if she has ever had a colonoscopy or not. She states that she has it has been at least 20 to 30 years ago. Review of Systems   Constitutional: Negative for appetite change, fatigue, fever and unexpected weight change. HENT: Negative for trouble swallowing and voice change. Eyes: Negative. Respiratory: Negative for choking. Cardiovascular: Negative. Gastrointestinal: Positive for abdominal pain (mild LLQ) and anal bleeding (with wiping, attibutes to hemorrhoids). Negative for abdominal distention, blood in stool, constipation, diarrhea, nausea, rectal pain and vomiting. Endocrine: Negative. Genitourinary: Negative. Musculoskeletal: Negative. Skin: Negative. Allergic/Immunologic: Negative for food allergies. Neurological: Negative. Hematological: Negative. Psychiatric/Behavioral: Negative for agitation, decreased concentration, self-injury, sleep disturbance and suicidal ideas. The patient is not nervous/anxious.        Past Medical History:   Diagnosis Date    COPD (chronic obstructive pulmonary disease) ([facility]     Dyspnea on exertion 7/31/2019    Hypertension     Hypotension 7/13/2021    Impaired cognition     Irritable bowel syndrome without diarrhea 12/22/2020    Mild cognitive impairment with memory loss 9/25/2020    Osteoarthritis     Persistent atrial fibrillation ([address] 7/29/2021    Tobacco abuse 5/6/2019    Tobacco use disorder 5/6/2019    Vascular dementia ([facility] 6/22/2020     Past Surgical History:   Procedure Laterality Date    EYE SURGERY  01/01/2010    PER PROBLEM SHEET    HYSTERECTOMY  01/01/1975    PER PROBLEM SHEET  LITHOTRIPSY  01/01/2004    PER PROBLEM SHEET    TOTAL HIP ARTHROPLASTY Right 2019    TOTAL HIP ARTHROPLASTY Left 6/25/2021    LEFT HIP DIRECT ANTERIOR HIP REPLACEMENT performed by Blue Rodriguez MD at [facility]  01/01/1988    PER PROBLEM SHEET     Current Outpatient Medications on File Prior to Visit   Medication Sig Dispense Refill    flecainide (TAMBOCOR) 50 MG tablet Take 1 tablet by mouth every 12 hours 60 tablet 3    apixaban (ELIQUIS) 5 MG TABS tablet Take 1 tablet by mouth 2 times daily 60 tablet 2    metoprolol succinate (TOPROL XL) 25 MG extended release tablet Take 0.5 tablets by mouth daily 30 tablet 3    docusate sodium (COLACE, DULCOLAX) 100 MG CAPS Take 100 mg by mouth 2 times daily 60 capsule 2    vitamin B-12 (CYANOCOBALAMIN) 500 MCG tablet Take 500 mcg by mouth daily      fluticasone (FLONASE) 50 MCG/ACT nasal spray USE 2 SPRAYS NASALLY DAILY 16 g 5    umeclidinium-vilanterol (ANORO ELLIPTA) 62.5-25 MCG/INH AEPB inhaler Inhale 1 puff into the lungs daily 90 puff 1    alendronate (FOSAMAX) 70 MG tablet Take 1 tablet by mouth every 7 days 12 tablet 4    atorvastatin (LIPITOR) 20 MG tablet Take 1 tablet by mouth daily 90 tablet 4    escitalopram (LEXAPRO) 10 MG tablet Take 1 tablet by mouth daily 90 tablet 1    albuterol sulfate  (90 Base) MCG/ACT inhaler Inhale 2 puffs into the lungs every morning (before breakfast) (Patient taking differently: Inhale 2 puffs into the lungs every 4 hours as needed ) 1 Inhaler 3    OXYGEN Start oxygen therapy at 2 lit with activity and sleep , give POC for ambulation     Dx COPD 1 Units 0    coenzyme Q10 100 MG CAPS capsule Take 100 mg by mouth 2 times daily       vitamin D (CHOLECALCIFEROL) 1000 UNIT TABS tablet Take 1,000 Int'l Units by mouth daily.  donepezil (ARICEPT) 5 MG tablet Take 1 tablet by mouth nightly 90 tablet 4     No current facility-administered medications on file prior to visit.      Family History Problem Relation Age of Onset    Diabetes Other         GRANDMOTHER    Colon Cancer Neg Hx      Social History     Socioeconomic History    Marital status:      Spouse name: None    Number of children: None    Years of education: None    Highest education level: None   Occupational History    None   Tobacco Use    Smoking status: Former Smoker     Packs/day: 0.75     Years: 45.00     Pack years: 33.75     Types: Cigarettes     Start date: 5     Quit date: 2019     Years since quittin.5    Smokeless tobacco: Never Used   Vaping Use    Vaping Use: Never used   Substance and Sexual Activity    Alcohol use: Not Currently    Drug use: No    Sexual activity: None   Other Topics Concern    None   Social History Narrative         Lives With: Alone son is helpful and is her POA for health care    Type of Home: House    Home Layout: One level    Home Access: Stairs to enter with rails    Entrance Stairs - Number of Steps: 2    Bathroom Shower/Tub: Tub/Shower unit, Walk-in shower    Bathroom Equipment: Hand-held shower    ADL Assistance: Independent    Homemaking Assistance: Independent    Ambulation Assistance: Independent(No AD)    Transfer Assistance: Independent    Active : Yes    Additional Comments: Pt. reports she has fallen at home but not recently and not regularly. Pt. having word finding difficulty, but sons are present and clarify pt's answers     Social Determinants of Health     Financial Resource Strain: Low Risk     Difficulty of Paying Living Expenses: Not hard at all   Food Insecurity: No Food Insecurity    Worried About Running Out of Food in the Last Year: Never true    [address] in the Last Year: Never true   Transportation Needs:     Lack of Transportation (Medical):      Lack of Transportation (Non-Medical):    Physical Activity:     Days of Exercise per Week:     Minutes of Exercise per Session:    Stress:     Feeling of Stress :    Social 07/25/2021    MCV 94.2 07/25/2021     (H) 07/25/2021     Lab Results   Component Value Date    IRON 23 (L) 07/14/2021    TIBC 152 (L) 07/14/2021    FERRITIN 225.7 (H) 07/14/2021     Lab Results   Component Value Date    [id number] 716 07/15/2021      Lab Results   Component Value Date    FOLATE 11.3 07/15/2021     Lab Results   Component Value Date    LABALBU 3.3 (L) 07/20/2021      Lab Results   Component Value Date    ALT 14 07/20/2021    AST 25 07/20/2021    ALKPHOS 133 (H) 07/20/2021    BILITOT 0.3 07/20/2021 7/13/21 CT abd/pelvis: extensive diverticulosis with bowel wall thickening and surrounding mesenteric soft tissue stranding along the rectosigmoid region, suspicious for acute diverticulitis. Moderate amount of colonic stool. Mesentery/Peritoneum: Mild soft tissue stranding about the rectosigmoid. 7/21/21 CT abd/pelvis: Again note is made of mural thickening of the rectosigmoid portion of the colon. There is some mild surrounding pericolonic stranding. No extraluminal air. No focal fluid collection. Unchanged. Differential considerations may be that of a regional enteritis, colitis, diffusely infiltrative processes not excluded. Will need follow-up and further evaluation. Again note is made of a large amount of stool scattered throughout the large intestine. Assessment and Plan:  80 y.o. female with history of chronic respiratory failure with hypoxia (2 to 3 L nasal cannula at home), TIA/CVA, HTN, vascular dementia, recent left hip replacement (June 25, 2021) presents to the GI clinic as a hospital follow up for acute diverticulitis per CT abd on 7/13/21. Patient continued symptoms despite inpatient and outpatient antibiotics. Repeat CT abdomen 7/21/2021 revealed persistent mural thickening of the rectosigmoid portion of the colon with mild pericolonic stranding. Patient continues to improve clinically.   She endorses daily brown formed bowel movements, and a mild left lower supplementation in the future once diverticulitis resolution is ensured.    -Discussed recommendation of increased water intake. Return 2 weeks after completion of colonoscopy.     Alexandru Flower APRN - CNP   Staff Gastroenterology Nurse Practitioner  [facility]

## 2021-08-11 NOTE — TELEPHONE ENCOUNTER
----- Message from Lori Power sent at 8/10/2021  9:18 AM EDT -----  Subject: Appointment Request    Reason for Call: Urgent (Patient Request) ED Follow Up Visit    QUESTIONS  Type of Appointment? Established Patient  Reason for appointment request? Available appointments did not meet   patient need  Additional Information for Provider? Needs KATHERINE F/U was in [facility] last night 8-9   zoned out, eyes open could not speak, they did a CTSCAN, chest X-Ray,   blood work & IV. They said to follow up with PCP. Son Orin Hernandez is saying   medication has been changed last 2-3 weeks & wondering if that could be   cause as it happened before when medication had been changed maybe 5 years   ago. Please contact Orin Rodessa with avail [employer]. [phone]  ---------------------------------------------------------------------------  --------------  Yovani LONGORIA  What is the best way for the office to contact you? OK to leave message on   voicemail  Preferred Call Back Phone Number? [phone]  ---------------------------------------------------------------------------  --------------  SCRIPT ANSWERS  Relationship to Patient? Other  Representative Name? Maxi-son  Additional information verified (besides Name and Date of Birth)? Address  (Patient requests to see provider urgently. )? Yes  Do you have any questions for your primary care provider that need to be   answered prior to your appointment? Yes  Have you been diagnosed with, awaiting test results for, or told that you   are suspected of having COVID-19 (Coronavirus)? (If patient has tested   negative or was tested as a requirement for work, school, or travel and   not based on symptoms, answer no)? No  Do you currently have flu-like symptoms including fever or chills, cough,   shortness of breath, difficulty breathing, or new loss of taste or smell? No  Have you had close contact with someone with COVID-19 in the last 14 days?    No  (Service Expert  click yes below to proceed with Angela Lord As Usual   Scheduling)?  Yes

## 2021-08-12 NOTE — TELEPHONE ENCOUNTER
Patient's son called he said his mother  just saw Dr. Dragan Shin, and his mother  still has pain in her left side, Dr. Dragan Shin  thinks she still has  inflammation. Dr. Dragan Shin  thinks the patient could have colitis instead of diverticulitis. Óscar Voss She wants to prescribe prednisone but son wants to speak with you before doing this. Can you call the son to advise your thoughts and if she should being this new medication?

## 2021-08-12 NOTE — PROGRESS NOTES
Subjective  Viann Ze, 80 y.o. female presents today with:  Chief Complaint   Patient presents with    Follow-Up from Hospital     e/r follow up; feels weak; obinna BELLAMY    Here with son. Was unresponsive/dazed and staring \"like a puppet. \"  Then she threw up and seemed ok. Was evaluated in ER and found to have stable labs and unchanged head CT per family report. Was d/c'd without intervention. Has been having low appetite since above described episode. This AM took meds on empty stomach and felt nauseated. Drank Glucerna and vomited on the way to appointment. Emesis appeared very yellow. (Photo taken appears dark yellow.) Had been having LLQ pain this AM but it is going away now.     No other questions and or concerns for today's visit      Review of Systems  See above    Past Medical History:   Diagnosis Date    COPD (chronic obstructive pulmonary disease) ([address]     Dyspnea on exertion 7/31/2019    Hypertension     Hypotension 7/13/2021    Impaired cognition     Irritable bowel syndrome without diarrhea 12/22/2020    Mild cognitive impairment with memory loss 9/25/2020    Osteoarthritis     Persistent atrial fibrillation ([address] 7/29/2021    Tobacco abuse 5/6/2019    Tobacco use disorder 5/6/2019    Vascular dementia ([address] 6/22/2020     Past Surgical History:   Procedure Laterality Date    EYE SURGERY  01/01/2010    PER PROBLEM SHEET    HYSTERECTOMY  01/01/1975    PER PROBLEM SHEET    LITHOTRIPSY  01/01/2004    PER PROBLEM SHEET    TOTAL HIP ARTHROPLASTY Right 2019    TOTAL HIP ARTHROPLASTY Left 6/25/2021    LEFT HIP DIRECT ANTERIOR HIP REPLACEMENT performed by Alta Nicolas MD at [facility]  01/01/1988    PER PROBLEM SHEET     Social History     Socioeconomic History    Marital status:      Spouse name: Not on file    Number of children: Not on file    Years of education: Not on file    Highest education level: Not on file   Occupational History    Not on file   Tobacco Use    Smoking status: Former Smoker     Packs/day: 0.75     Years: 45.00     Pack years: 33.75     Types: Cigarettes     Start date: 5     Quit date: 2019     Years since quittin.5    Smokeless tobacco: Never Used   Vaping Use    Vaping Use: Never used   Substance and Sexual Activity    Alcohol use: Not Currently    Drug use: No    Sexual activity: Not on file   Other Topics Concern    Not on file   Social History Narrative         Lives With: Alone son is helpful and is her POA for health care    Type of Home: House    Home Layout: One level    Home Access: Stairs to enter with rails    Entrance Stairs - Number of Steps: 2    Bathroom Shower/Tub: Tub/Shower unit, Walk-in shower    Bathroom Equipment: Hand-held shower    ADL Assistance: Independent    Homemaking Assistance: Independent    Ambulation Assistance: Independent(No AD)    Transfer Assistance: Independent    Active : Yes    Additional Comments: Pt. reports she has fallen at home but not recently and not regularly. Pt. having word finding difficulty, but sons are present and clarify pt's answers     Social Determinants of Health     Financial Resource Strain: Low Risk     Difficulty of Paying Living Expenses: Not hard at all   Food Insecurity: No Food Insecurity    Worried About Running Out of Food in the Last Year: Never true    [address] in the Last Year: Never true   Transportation Needs:     Lack of Transportation (Medical):      Lack of Transportation (Non-Medical):    Physical Activity:     Days of Exercise per Week:     Minutes of Exercise per Session:    Stress:     Feeling of Stress :    Social Connections:     Frequency of Communication with Friends and Family:     Frequency of Social Gatherings with Friends and Family:     Attends Hoahaoism Services:     Active Member of Clubs or Organizations:     Attends Club or Organization Meetings:     Marital Status:    Intimate Partner Violence:  Fear of Current or Ex-Partner:     Emotionally Abused:     Physically Abused:     Sexually Abused:      Family History   Problem Relation Age of Onset    Diabetes Other         GRANDMOTHER    Colon Cancer Neg Hx      No Known Allergies  Current Outpatient Medications   Medication Sig Dispense Refill    predniSONE (DELTASONE) 20 MG tablet Take 2 tablets by mouth daily for 5 days 10 tablet 0    flecainide (TAMBOCOR) 50 MG tablet Take 1 tablet by mouth every 12 hours 60 tablet 3    apixaban (ELIQUIS) 5 MG TABS tablet Take 1 tablet by mouth 2 times daily 60 tablet 2    docusate sodium (COLACE, DULCOLAX) 100 MG CAPS Take 100 mg by mouth 2 times daily 60 capsule 2    vitamin B-12 (CYANOCOBALAMIN) 500 MCG tablet Take 500 mcg by mouth daily      fluticasone (FLONASE) 50 MCG/ACT nasal spray USE 2 SPRAYS NASALLY DAILY 16 g 5    donepezil (ARICEPT) 5 MG tablet Take 1 tablet by mouth nightly 90 tablet 4    umeclidinium-vilanterol (ANORO ELLIPTA) 62.5-25 MCG/INH AEPB inhaler Inhale 1 puff into the lungs daily 90 puff 1    alendronate (FOSAMAX) 70 MG tablet Take 1 tablet by mouth every 7 days 12 tablet 4    atorvastatin (LIPITOR) 20 MG tablet Take 1 tablet by mouth daily 90 tablet 4    escitalopram (LEXAPRO) 10 MG tablet Take 1 tablet by mouth daily 90 tablet 1    albuterol sulfate  (90 Base) MCG/ACT inhaler Inhale 2 puffs into the lungs every morning (before breakfast) (Patient taking differently: Inhale 2 puffs into the lungs every 4 hours as needed ) 1 Inhaler 3    OXYGEN Start oxygen therapy at 2 lit with activity and sleep , give POC for ambulation     Dx COPD 1 Units 0    coenzyme Q10 100 MG CAPS capsule Take 100 mg by mouth 2 times daily       vitamin D (CHOLECALCIFEROL) 1000 UNIT TABS tablet Take 1,000 Int'l Units by mouth daily. No current facility-administered medications for this visit. PMH, Surgical Hx, Family Hx, and Social Hxreviewed and updated.   Health Maintenance reviewed. Objective    Vitals:    08/12/21 1103 08/12/21 1134   BP:  96/60   Pulse: 79    Resp: 16    Temp: 95.7 °F (35.4 °C)    TempSrc: Temporal    SpO2: 98%    Weight: 160 lb (72.6 kg)    Height: 5' (1.524 m)         Physical Exam  Constitutional:       General: She is not in acute distress. Appearance: She is well-developed. She is ill-appearing ( chronically, appears fatigued though improving). HENT:      Head: Normocephalic and atraumatic. Mouth/Throat:      Mouth: Mucous membranes are dry. Eyes:      General: No scleral icterus. Conjunctiva/sclera: Conjunctivae normal.   Cardiovascular:      Rate and Rhythm: Normal rate and regular rhythm. Heart sounds: Normal heart sounds. Pulmonary:      Effort: Pulmonary effort is normal. No respiratory distress. Breath sounds: Normal breath sounds. No wheezing or rales. Abdominal:      General: Bowel sounds are normal. There is no distension. Palpations: Abdomen is soft. There is no mass. Tenderness: There is abdominal tenderness (LLQ). There is no guarding or rebound. Skin:     General: Skin is warm and dry. Coloration: Skin is pale ( though improving). Neurological:      Mental Status: She is alert and oriented to person, place, and time.       Comments: Hypophonic, slow gait though steady using walker         Colonoscopy  No dictation   ECHO Complete 2D W Doppler W Color  Transthoracic Echocardiography Report (TTE)     Demographics      Patient Name    Jim Seals Gender                Female      Patient Number  [id number]        Race                                                        Ethnicity      Visit Number    [id number]       Room Number           B541      Corporate ID                    Date of Study         07/21/2021      Accession       [id number]      Referring Physician   Number      Date of Birth   1940      Sonographer           Mamie Schmidt      Age             80 year(s) thickened aortic valve leaflets with preserved leaflet mobility. Aortic valve appears to be tricuspid. Pulmonic Valve  Normal pulmonic valve structure and function. Pericardial Effusion  Small anterior pericardial effusion. Pleural Effusion  No evidence of pleural effusion. Aorta \ Miscellaneous  Miscellaneous normal findings were found. M-Mode Measurements (cm)      LVIDd: 2.77 cm                         LVIDs: 1.99 cm   IVSd: 1.56 cm                          IVSs: 1.59 cm   LVPWd: 1.29 cm                         LVPWs: 1.62 cm   Rt. Vent.  Dimension: 2.73 cm           AO Root Dimension: 2.81 cm                                          ACS: 1.67 cm                                          LA: 3.96 cm                                          LVOT: 2.03 cm     Doppler Measurements:      AV Velocity:0.03 m/s   AV Peak Gradient: 7.59 mmHg   AV Mean Gradient: 4.54 mmHg   AV Area (Continuity):3.4 cm^2              Estimated RAP:5 mmHg   TR Velocity:1.65 m/s                       RVSP:15.89 mmHg   TR Gradient:10.89 mmHg     Valves     Aortic Valve      Peak Velocity: 1.38 m/s               Mean Velocity: 1.03 m/s   Peak Gradient: 7.59 mmHg              Mean Gradient: 4.54 mmHg   Area (continuity): 3.4 cm^2   AV VTI: 24.8 cm      Cusp Separation: 1.67 cm      Tricuspid Valve      Estimated RVSP: 15.89 mmHg              Estimated RAP: 5 mmHg   TR Velocity: 1.65 m/s                   TR Gradient: 10.89 mmHg      Pulmonic Valve      Peak Velocity: 1.01 m/s           Peak Gradient: 4.08 mmHg                                     Estimated PASP: 15.89 mmHg      LVOT      Peak Velocity: 1.23 m/s              Mean Velocity: 0.85 m/s   Peak Gradient: 6.07 mmHg             Mean Gradient: 3.17 mmHg   LVOT Diameter: 2.03 cm               LVOT VTI: 26.08 cm     Structures     Left Atrium      LA Dimension: 3.96 cm                        LA Area: 14.69 cm^2   LA/Aorta: 1.41   LA Volume/Index: 51.82 ml /31 m^2      Left Ventricle Diastolic Dimension: 5.76 cm          Systolic Dimension: 8.92 cm   Septum Diastolic: 7.82 cm             Septum Systolic: 8.47 cm   PW Diastolic: 7.78 cm                 PW Systolic: 3.18 cm                                         FS: 28.2 %   LV EDV/LV EDV Index: 28.81 ml/17 m^2  LV ESV/LV ESV Index: 12.6 ml/8 m^2   EF Calculated: 56.3 %      LVOT Diameter: 2.03 cm      Right Atrium      RA Systolic Pressure: 5 mmHg      Right Ventricle      Diastolic Dimension: 0.90 cm                                     RV Systolic Pressure: 14.95 mmHg     Aorta/ Miscellaneous  Aorta      Aortic Root: 2.81 cm   LVOT Diameter: 2.03 cm         Lab Results   Component Value Date    LABA1C 6.0 (H) 11/13/2019     Lab Results   Component Value Date    LABMICR <1.20 05/06/2019    CREATININE 0.57 07/25/2021     Lab Results   Component Value Date    ALT 14 07/20/2021    AST 25 07/20/2021     Lab Results   Component Value Date    CHOL 102 07/21/2021    TRIG 116 07/21/2021    HDL 38 (L) 07/21/2021    LDLCALC 41 07/21/2021        Assessment & Plan   Visit Diagnoses and Associated Orders     Non-intractable vomiting with nausea, unspecified vomiting type    -  Primary    Suspect releated to colitis. SMall frequent bites and sips. Hypotension, unspecified hypotension type        On no antihypertensives. SUpplenet oral intake with low sugar electrolyte replacement drinks. Colitis        Given persistent of LLQ pain and appearence of colitis on CT persisting in spite of intensive abx therapy, will treat with prednisone.  risks reviewed    predniSONE (DELTASONE) 20 MG tablet [0896]           Chronic obstructive pulmonary disease, unspecified COPD type ([facility]        CT CHEST W CONTRAST [27433 Custom]   - Future Order         Hypoxia        CT CHEST W CONTRAST [[id number] Custom]   - Future Order         Personal history of nicotine dependence        CT CHEST W CONTRAST [[id number] Custom]   - Future Order               Time spent: 39 minutes; son was present      Reviewed with the patient: all disease processes, current clinical status, medications, activities and diet.      Side effects, adverse effects of the medication prescribed today, as well as treatment plan/ rationale and result expectations have been discussed with the patient who expresses understanding and desires to proceed.     Close follow up to evaluate treatment results and for coordination of care. I have reviewed the patient's medical history in detail and updated the computerized patient record. More than 50% of the appointment was spent in face-to-face counseling, education and care coordination. Please note this report has been partially produced using speech recognition software and may contain mistakes related to that system including errors in grammar, punctuation and spelling as well as words and phrases that may seem inappropriate. If there are questions or concerns, please feel free to contact me to clarify. Orders Placed This Encounter   Procedures    CT CHEST W CONTRAST     Standing Status:   Future     Standing Expiration Date:   8/12/2022     Order Specific Question:   Reason for exam:     Answer:   COPD and hypoxia     Orders Placed This Encounter   Medications    predniSONE (DELTASONE) 20 MG tablet     Sig: Take 2 tablets by mouth daily for 5 days     Dispense:  10 tablet     Refill:  0     Medications Discontinued During This Encounter   Medication Reason    metoprolol succinate (TOPROL XL) 25 MG extended release tablet Side effects     Return in about 2 weeks (around 8/26/2021) for hypotension, colitis - OV. Controlled Substance Monitoring:    Acute and Chronic Pain Monitoring:   No flowsheet data found.         Marina Green MD

## 2021-08-12 NOTE — TELEPHONE ENCOUNTER
Orin Hernandez called in on behalf of pt. And stated you were going to order a CT of her lung but they needed to contact insurance company first. Orin Hernandez states he contacted insurance company and they said the order needs to be put in and approved and then the insurance will pay for it.     Please advise and thank you

## 2021-08-12 NOTE — TELEPHONE ENCOUNTER
Called patient/patient's son, Pham Winters. Patient's son reported that after her last visit at the GI clinic she had an episode of  vomiting, and unresponsiveness for which she was taken to [facility] ER. CT scan of her head and chest scan was negative. States she was sent home with recommendation for outpatient follow-up. Spoke with him regarding patient's symptoms today. States she was alert but tired. He states when he went to her house to check on her this morning at around 945 she had not drank any of her water or eaten anything. States he had her drink water and Ensure along with taking her morning pills. States he loaded her up into the car to take her to her PCP appointment today and she vomited alongside the road on the way. He states it was yellow/bile/phlegm. He denies hematemesis. He states in the office her blood pressure was borderline low at 96/69. States her PCP pressed on her left side and it was tender to palpation. Patient's son is unsure if she has had diarrhea. States that she did have some blood on toilet paper when wiping. Discussed with patient/son to hold off on taking prednisone till we examine her stool to see if it is loose/bloody and to rule out infectious process. Instructed son to examine stool color/consistency/frequency and let me know if there is blood. Discussed alarm signs/symptoms such as lethargy, dizziness, diaphoresis, pallor, blood in the toilet other than a few drops with wiping, or black tarry stools to take patient to emergency room for further evaluation.

## 2021-08-12 NOTE — TELEPHONE ENCOUNTER
I placed the order. However, that does not mean there will not be a billed balance. That information can be obtained from the billing department.

## 2021-08-17 ENCOUNTER — CARE COORDINATION (OUTPATIENT)
Dept: CASE MANAGEMENT | Age: 81
End: 2021-08-17

## 2021-08-17 NOTE — CARE COORDINATION
[facility] Transitions Follow Up Call    2021    Patient: Brinda Dooley  Patient : 1940   MRN: [id number]  Reason for Admission: Hypotension Chest pain  Diverticulitis  Discharge Date: 21 RARS: Readmission Risk Score: 18    Second subsequent CT outreach attempt. Left Hippa compliant VM. Notes pt admission to [facility] 2021 for GIB. ACM referral. CTN s/o. Canceled last few PCP appts. Care Transitions Subsequent and Final Call    Subsequent and Final Calls  Care Transitions Interventions  Other Interventions:            Follow Up  Future Appointments   Date Time Provider Lily Mathis   2021  9:00 AM [place] [facility] 2 [facility] Fac RAD   10/15/2021  3:00 PM YRN Rivera - PASTOR [facility]   10/21/2021  3:30 PM Surenrda Betancur MD [facility]   2021  3:30 PM Jael Molina MD [address]

## 2021-09-08 ENCOUNTER — CARE COORDINATION (OUTPATIENT)
Dept: CARE COORDINATION | Age: 81
End: 2021-09-08

## 2023-02-09 NOTE — PROGRESS NOTES
Physician Progress Note    7/14/2021   2:57 PM    Name:  Rosalind Fair  MRN:    [id number]      Day: 1     Admit Date: 7/13/2021 11:00 AM  PCP: Hal Alvares MD    Code Status:  Full Code    Subjective:     Abdominal pain has mostly improved. No nausea or vomiting. Taking clears without complication.     Current Facility-Administered Medications   Medication Dose Route Frequency Provider Last Rate Last Admin    potassium & sodium phosphates (PHOS-NAK) 280-160-250 MG packet 250 mg  1 packet Oral 4x Daily Rendell Speedy, DO   250 mg at 07/14/21 1229    dextrose 5 % in lactated ringers infusion   Intravenous Continuous Rendell Speedy,  mL/hr at 07/14/21 1430 New Bag at 07/14/21 1430    sodium chloride flush 0.9 % injection 5-40 mL  5-40 mL Intravenous 2 times per day Rendell Speedy, DO   10 mL at 07/14/21 0805    sodium chloride flush 0.9 % injection 5-40 mL  5-40 mL Intravenous PRN Rendell Speedy, DO   10 mL at 07/13/21 1734    0.9 % sodium chloride infusion  25 mL Intravenous PRN Rendell Speedy, DO        enoxaparin (LOVENOX) injection 40 mg  40 mg Subcutaneous Daily Rendell Speedy, DO   40 mg at 07/14/21 0804    ondansetron (ZOFRAN-ODT) disintegrating tablet 4 mg  4 mg Oral Q8H PRN Rendell Speedy, DO        Or    ondansetron [facility]) injection 4 mg  4 mg Intravenous Q6H PRN Rendell Speedy, DO        polyethylene glycol (GLYCOLAX) packet 17 g  17 g Oral Daily PRN Rendell Speedy, DO        acetaminophen (TYLENOL) tablet 650 mg  650 mg Oral Q6H PRN Rendell Speedy, DO        Or    acetaminophen (TYLENOL) suppository 650 mg  650 mg Rectal Q6H PRN Rendell Speedy, DO        piperacillin-tazobactam (ZOSYN) 3,375 mg in dextrose 5 % 50 mL IVPB extended infusion (mini-bag)  3,375 mg Intravenous Q8H Rendell Speedy, DO   Stopped at 07/14/21 1203    lactobacillus acidophilus [facility]) 4 tablet  4 tablet Oral TID Rendell Speedy, DO   4 tablet at 07/14/21 1229    traMADol Ramya Ku) tablet 50 mg  50 mg Oral Q6H PRN Jhonny Green, DO        ketorolac (TORADOL) injection 15 mg  15 mg Intravenous Q6H PRN Jhonny Green, DO        albuterol sulfate  (90 Base) MCG/ACT inhaler 2 puff  2 puff Inhalation Q4H PRN Jhonny Green, DO        atorvastatin (LIPITOR) tablet 20 mg  20 mg Oral Daily Jhonny Green, DO        donepezil (ARICEPT) tablet 5 mg  5 mg Oral Nightly Jhonny Green, DO   5 mg at 218    escitalopram (LEXAPRO) tablet 10 mg  10 mg Oral Daily Jhonny Green, DO   10 mg at 21 4888    glycopyrrolate-formoterol (BEVESPI) 9-4.8 MCG/ACT inhaler 2 puff  2 puff Inhalation BID Jhonny Green, DO   2 puff at 21 1102    vitamin B-12 (CYANOCOBALAMIN) tablet 500 mcg  500 mcg Oral Daily Jhonny Green, DO   500 mcg at 21 4972    vitamin D (CHOLECALCIFEROL) tablet 1,000 Units  1,000 Int'l Units Oral Daily Jhonny Green, DO   1,000 Units at 21 0803       Physical Examination:      Vitals:  /63   Pulse 76   Temp 98.1 °F (36.7 °C) (Oral)   Resp 18   Ht 5' (1.524 m)   Wt 158 lb (71.7 kg)   LMP  (LMP Unknown)   SpO2 100%   BMI 30.86 kg/m²   Temp (24hrs), Av.9 °F (36.6 °C), Min:97.2 °F (36.2 °C), Max:98.2 °F (36.8 °C)      General appearance: alert, cooperative and no distress  Mental Status: oriented to person, place and time and normal affect  Lungs: clear to auscultation bilaterally, normal effort  Heart: regular rate and rhythm, no murmur  Abdomen: soft, nontender, nondistended, bowel sounds present, no masses  Extremities: no edema, redness, tenderness in the calves.  Cap refill <2s  Skin: no gross lesions, rashes    Data:     Labs:  Recent Labs     21  1115 21  0648   WBC 11.0* 7.2   HGB 9.4* 7.8*   * 509*     Recent Labs     21  1115 21  1132 21  0648     --  143   K 3.5  --  3.9     --  108*   CO2 27  --  28   BUN 17  --  14   CREATININE 0.84 0.9 0.55   GLUCOSE 133*  --  117* Recent Labs     07/13/21  1115   AST 18   ALT 8   BILITOT 0.5   ALKPHOS 146*       Assessment and Plan:        79-year-old female with chronic respiratory failure with hypoxia on 2-3 L O2, history of TIA, hypertension, recent hip replacement presents to the ED from her PCPs office with 2-week history of abdominal pain and soft blood pressure.     1. Acute diverticulitis: First episode: Improving  -IV antibiotic, IV fluid, clears, pain control  -Stop IV fluid this evening  -Aim to advance diet with breakfast 7/15  -follow-up with GI as outpatient in several weeks for colonoscopy     2. Hypotension reported PCPs office: Stable vitals since admission. Continue to monitor. Hold home amlodipine for now     3. Anemia with iron studies more consistent with anemia of chronic disease. Recommend eventual colonoscopy as outpatient. Monitor H&H. We will give IV iron tonight.     COPD/chronic respiratory failure with hypoxia (FEV1 58%)  Memory disorder /vascular dementia on Aricept  History of TIA: Patient has discontinued aspirin on her own.   She was educated on its use for secondary prevention     lovenox ppx  Full code    Son updated over the phone    Anticipate discharge home on 7/15     >35 minutes in total care time    Electronically signed by Ariella Hamilton DO on 7/14/2021 at 2:57 PM normal

## (undated) DEVICE — INTENDED FOR TISSUE SEPARATION, AND OTHER PROCEDURES THAT REQUIRE A SHARP SURGICAL BLADE TO PUNCTURE OR CUT.: Brand: BARD-PARKER ® CARBON RIB-BACK BLADES

## (undated) DEVICE — NEEDLE SPINAL 22GA L3.5IN SPINOCAN

## (undated) DEVICE — BLADE ES L6IN ELASTOMERIC COAT EXT DURABLE BEND UPTO 90DEG

## (undated) DEVICE — MARKER SURG SKIN GENTIAN VLT REG TIP W/ 6IN RUL

## (undated) DEVICE — APPLICATOR MEDICATED 26 CC SOLUTION HI LT ORNG CHLORAPREP

## (undated) DEVICE — GLOVE SURG SZ 8 L12IN FNGR THK79MIL GRN LTX FREE

## (undated) DEVICE — GLOVE ORANGE PI 8   MSG9080

## (undated) DEVICE — SUTURE VCRL SZ 2-0 L27IN ABSRB UD L26MM SH 1/2 CIR J417H

## (undated) DEVICE — PACK ORTHOPEDIC 1 TBL CVR 50X90 REINF 1 MAYO STD CVR 24X53 REINF 4 UTIL

## (undated) DEVICE — YANKAUER,SMOOTH HANDLE,HIGH CAPACITY: Brand: MEDLINE INDUSTRIES, INC.

## (undated) DEVICE — Device: Brand: BOOT LINER, DISPOSABLE

## (undated) DEVICE — Z DISCONTINUED PER MEDLINE USE 2741943 DRESSING AQUACEL 10 IN ALG W9XL25CM SIL CVR WTRPRF VIR BACT BARR ANTIMIC

## (undated) DEVICE — SIPS DUAL 2 MINUTE TIP

## (undated) DEVICE — TUBING, SUCTION, 1/4" X 10', STRAIGHT: Brand: MEDLINE

## (undated) DEVICE — SYRINGE MED 50ML LUERLOCK TIP

## (undated) DEVICE — C-ARM: Brand: UNBRANDED

## (undated) DEVICE — GOWN,SIRUS,POLYRNF,BRTHSLV,LG,30/CS: Brand: MEDLINE

## (undated) DEVICE — SUTURE STRATAFIX SPRL SZ 1 L9IN ABSRB VLT CT 1 L36MM 1 2 CIR SXPD2B402

## (undated) DEVICE — SPONGE,LAP,18"X18",DLX,XR,ST,5/PK,40/PK: Brand: MEDLINE

## (undated) DEVICE — BIPOLAR SEALER 23-113-1 AQM 2.3 OM NEURO: Brand: AQUAMANTYS ®

## (undated) DEVICE — LABEL MED MINI W/ MARKER

## (undated) DEVICE — SUTURE VCRL SZ 1 L36IN ABSRB VLT L48MM CTX 1/2 CIR J371H

## (undated) DEVICE — SUTURE VCRL SZ 1 L36IN ABSRB UD L36MM CT-1 1/2 CIR J947H

## (undated) DEVICE — Device: Brand: PROTECTORS, LEG SPAR BALL JOINT, 12/PR

## (undated) DEVICE — TOWEL,OR,DSP,ST,BLUE,STD,4/PK,20PK/CS: Brand: MEDLINE

## (undated) DEVICE — COVER LT HNDL BLU PLAS

## (undated) DEVICE — SUTURE STRATAFIX SPRL MCRYL + SZ 3-0 L18IN ABSRB UD PS-2 SXMP1B107

## (undated) DEVICE — SHEET,DRAPE,53X77,STERILE: Brand: MEDLINE

## (undated) DEVICE — RECIPROCATING BLADE HEAVY DUTY LONG, OFFSET  (77.6 X 0.77 X 11.2MM)

## (undated) DEVICE — ELECTRODE PT RET AD L9FT HI MOIST COND ADH HYDRGEL CORDED

## (undated) DEVICE — 3M™ STERI-DRAPE™ INCISE DRAPE 1050 (60CM X 45CM): Brand: STERI-DRAPE™

## (undated) DEVICE — ADHESIVE SKIN CLSR 0.7ML TOP DERMBND ADV

## (undated) DEVICE — 450 ML BOTTLE OF 0.05% CHLORHEXIDINE GLUCONATE IN 99.95% STERILE WATER FOR IRRIGATION, USP AND APPLICATOR.: Brand: IRRISEPT ANTIMICROBIAL WOUND LAVAGE

## (undated) DEVICE — HOOD: Brand: FLYTE, SURGICOOL

## (undated) DEVICE — 3M™ IOBAN™ 2 ANTIMICROBIAL INCISE DRAPE 6650EZ: Brand: IOBAN™ 2

## (undated) DEVICE — COUNTER NDL 40 COUNT HLD 70 FOAM BLK ADH W/ MAG

## (undated) DEVICE — 3M™ STERI-STRIP™ REINFORCED ADHESIVE SKIN CLOSURES, R1547, 1/2 IN X 4 IN (12 MM X 100 MM), 6 STRIPS/ENVELOPE: Brand: 3M™ STERI-STRIP™

## (undated) DEVICE — NEPTUNE E-SEP SMOKE EVACUATION PENCIL, COATED, 70MM BLADE, PUSH BUTTON SWITCH: Brand: NEPTUNE E-SEP

## (undated) DEVICE — 3M™ STERI-DRAPE™ INSTRUMENT POUCH 1018: Brand: STERI-DRAPE™

## (undated) DEVICE — 1016 S-DRAPE IRRIG POUCH 10/BOX: Brand: STERI-DRAPE™

## (undated) DEVICE — Device